# Patient Record
Sex: FEMALE | Race: WHITE
[De-identification: names, ages, dates, MRNs, and addresses within clinical notes are randomized per-mention and may not be internally consistent; named-entity substitution may affect disease eponyms.]

---

## 2017-07-03 ENCOUNTER — HOSPITAL ENCOUNTER (EMERGENCY)
Dept: HOSPITAL 10 - E/R | Age: 62
Discharge: HOME | End: 2017-07-03
Payer: MEDICARE

## 2017-07-03 VITALS
TEMPERATURE: 97.3 F | HEART RATE: 70 BPM | SYSTOLIC BLOOD PRESSURE: 138 MMHG | RESPIRATION RATE: 17 BRPM | DIASTOLIC BLOOD PRESSURE: 67 MMHG

## 2017-07-03 VITALS
BODY MASS INDEX: 22.89 KG/M2 | WEIGHT: 121.25 LBS | HEIGHT: 61 IN | WEIGHT: 121.25 LBS | HEIGHT: 61 IN | BODY MASS INDEX: 22.89 KG/M2

## 2017-07-03 DIAGNOSIS — Z99.2: ICD-10-CM
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
## 2017-07-03 DIAGNOSIS — S80.01XA: ICD-10-CM

## 2017-07-03 DIAGNOSIS — S09.90XA: Primary | ICD-10-CM

## 2017-07-03 DIAGNOSIS — S50.02XA: ICD-10-CM
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
## 2017-07-03 DIAGNOSIS — S02.2XXA: ICD-10-CM

## 2017-07-03 DIAGNOSIS — S80.02XA: ICD-10-CM

## 2017-07-03 DIAGNOSIS — I12.0: ICD-10-CM

## 2017-07-03 DIAGNOSIS — N18.6: ICD-10-CM

## 2017-07-03 LAB
ABNORMAL IP MESSAGE: 1
ADD SCAN DIFF: YES
ANION GAP SERPL CALC-SCNC: 22 MMOL/L (ref 8–16)
ANISOCYTOSIS BLD QL SMEAR: (no result)
BASOPHILS # BLD AUTO: 0 10^3/UL (ref 0–0.1)
BASOPHILS NFR BLD: 1 % (ref 0–2)
BUN SERPL-MCNC: 30 MG/DL (ref 7–20)
CALCIUM SERPL-MCNC: 9.5 MG/DL (ref 8.4–10.2)
CHLORIDE SERPL-SCNC: 94 MMOL/L (ref 97–110)
CO2 SERPL-SCNC: 31 MMOL/L (ref 21–31)
CREAT SERPL-MCNC: 3.94 MG/DL (ref 0.44–1)
EOSINOPHIL # BLD: 0.3 10^3/UL (ref 0–0.5)
EOSINOPHIL NFR BLD: 8.6 % (ref 0–7)
ERYTHROCYTE [DISTWIDTH] IN BLOOD BY AUTOMATED COUNT: 15.3 % (ref 11.5–14.5)
GLUCOSE SERPL-MCNC: 131 MG/DL (ref 70–220)
HCT VFR BLD CALC: 32.8 % (ref 37–47)
HGB BLD-MCNC: 10.3 G/DL (ref 12–16)
LYMPHOCYTES # BLD AUTO: 0.7 10^3/UL (ref 0.8–2.9)
LYMPHOCYTES NFR BLD AUTO: 25 % (ref 15–51)
MACROCYTES BLD QL SMEAR: (no result)
MCH RBC QN AUTO: 33.6 PG (ref 29–33)
MCHC RBC AUTO-ENTMCNC: 31.4 G/DL (ref 32–37)
MCV RBC AUTO: 106.8 FL (ref 82–101)
MONOCYTES # BLD: 0.2 10^3/UL (ref 0.3–0.9)
MONOCYTES NFR BLD: 6.5 % (ref 0–11)
NEUTROPHILS # BLD: 1.7 10^3/UL (ref 1.6–7.5)
NEUTROPHILS NFR BLD AUTO: 58.9 % (ref 39–77)
NRBC # BLD MANUAL: 0 10^3/UL (ref 0–0)
NRBC BLD QL: 0 /100WBC (ref 0–0)
PLATELET # BLD EST: (no result) 10*3/UL
PLATELET # BLD: 61 10^3/UL (ref 140–415)
PMV BLD AUTO: 11.3 FL (ref 7.4–10.4)
POTASSIUM SERPL-SCNC: 4.4 MMOL/L (ref 3.5–5.1)
RBC # BLD AUTO: 3.07 10^6/UL (ref 4.2–5.4)
SODIUM SERPL-SCNC: 143 MMOL/L (ref 135–144)
TOTAL CELLS COUNTED PERCENT: 100 %
WBC # BLD AUTO: 2.9 10^3/UL (ref 4.8–10.8)

## 2017-07-03 PROCEDURE — 96374 THER/PROPH/DIAG INJ IV PUSH: CPT

## 2017-07-03 PROCEDURE — 73562 X-RAY EXAM OF KNEE 3: CPT

## 2017-07-03 PROCEDURE — 93005 ELECTROCARDIOGRAM TRACING: CPT

## 2017-07-03 PROCEDURE — 70486 CT MAXILLOFACIAL W/O DYE: CPT

## 2017-07-03 PROCEDURE — 73080 X-RAY EXAM OF ELBOW: CPT

## 2017-07-03 PROCEDURE — 36415 COLL VENOUS BLD VENIPUNCTURE: CPT

## 2017-07-03 PROCEDURE — 80048 BASIC METABOLIC PNL TOTAL CA: CPT

## 2017-07-03 PROCEDURE — 70450 CT HEAD/BRAIN W/O DYE: CPT

## 2017-07-03 PROCEDURE — 85025 COMPLETE CBC W/AUTO DIFF WBC: CPT

## 2017-07-03 PROCEDURE — 96375 TX/PRO/DX INJ NEW DRUG ADDON: CPT

## 2017-07-03 NOTE — RADRPT
PROCEDURE:   CT brain without contrast. 

 

CLINICAL INDICATION:   Injury and pain. 

 

TECHNIQUE:    CT scan of the brain was performed on a multi-detector high-resolution CT scanner.  Co
ntiguous axial images were obtained from the skull base to the vertex without intravenous contrast. 
 Coronal and sagittal reformatted images were also obtained.  Images were reviewed on the PACS works
tation.

 

One or more of the following dose reduction techniques were used:

- Automated exposure control.

- Adjustment of the mA and/or kV according to patient size.

- Use of iterative reconstruction technique.

 

Exam CTD/vol = 44.68 mGy.

Total exam DLP = 720.23 mGy-cm.   

 

COMPARISON:   None. 

 

FINDINGS:

The ventricles and cortical sulci are prominent consistent with mild age related volume loss.  There
 are patchy areas of low attenuation within the periventricular white matter consistent with minimal
 chronic ischemic changes secondary to small vessel disease.  There is no mass effect or midline roselyn
ft.  There is no intracranial hemorrhage or abnormal extra-axial collection. There are atherosclerot
ic calcifications within bilateral distal internal carotid arteries.

 

The calvarium is intact.  There is no evidence of fracture.  Visualized paranasal sinuses and mastoi
d air cells are clear.  

 

IMPRESSION:

No acute intracranial abnormality identified.

 

Mild age related volume loss and minimal chronic ischemic white matter disease.

 

Cerebral atherosclerosis.

_____________________________________________ 

.Ryan Cameron MD, MD           Date    Time 

Electronically viewed and signed by .Ryan Cameron MD, MD on 07/03/2017 03:04 

 

D:  07/03/2017 03:04  T:  07/03/2017 03:04

.T/

## 2017-07-03 NOTE — RADRPT
PROCEDURE:   CT facial bones without contrast. 

 

CLINICAL INDICATION:   Injury and pain. 

 

TECHNIQUE:    CT scan of the facial bones [facility]  Contiguous axial images were obtained without intravenous contrast.  Coronal and sagittal refor
matted images were also obtained.  Images were reviewed on the PACS workstation.

 

One or more of the following dose reduction techniques were used:

- Automated exposure control.

- Adjustment of the mA and/or kV according to patient size.

- Use of iterative reconstruction technique.

 

Exam CTD/vol = 29.52 mGy.

Total exam DLP = 600.40 mGy-cm.   

 

COMPARISON:   None. 

 

FINDINGS:

There are fractures of the nasal bone.  Bilateral orbital rims, zygoma and zygomatic arches are inta
ct.  The pterygoid plates are intact. The mandible and maxilla are within normal limits.  Bilateral 
temporomandibular joints are within normal limits.  There is mild mucoperiosteal disease within the 
left maxillary sinus and right frontal sinus.  There are no air-fluid levels.

 

Bilateral orbital globes are symmetric and within normal limits.  The extraocular muscles are symmet
saba and of normal caliber.  Preseptal spaces are within normal limits.  Retrobulbar fat are clear.  
Bilateral optic nerves and superior ophthalmic veins are within normal limits.  

 

IMPRESSION:

Nasal bone fractures.

Mild paranasal sinus inflammatory disease.

_____________________________________________ 

.Ryan Cameron MD, MD           Date    Time 

Electronically viewed and signed by .Ryan Cameron MD, MD on 07/03/2017 03:08 

 

D:  07/03/2017 03:08  T:  07/03/2017 03:08

.T/

## 2017-07-03 NOTE — RADRPT
PROCEDURE:    XR Elbow. 

 

CLINICAL INDICATION:   Trauma.  Pain. 

 

TECHNIQUE:   Three views of the left elbow are available for review 

 

COMPARISON:   None available 

 

FINDINGS:

 

There is no fracture.  Joint relationships are maintained.  Bone mineralization is within normal crum
its.  There is no posterior fat pad sign.  Soft tissues unremarkable.

 

IMPRESSION:

 

1. No fracture or dislocation.

 

RPTAT:  HMVK

_____________________________________________ 

.Davie Villafana MD, MD           Date    Time 

Electronically viewed and signed by .Davie Villafana MD, MD on 07/03/2017 02:35 

 

D:  07/03/2017 02:35  T:  07/03/2017 02:35

.K/

## 2017-07-03 NOTE — ERD
ER Documentation


Chief Complaint


Date/Time


DATE: 7/3/17 


TIME: 05:36


Chief Complaint


PT FELL ASLEEP AND FELL FORWARD, LEFT ELBOW INJURY, NOSE INJURY





HPI


This 62-year-old female presents for falling asleep while sitting and falling 

forward.  She fell forward and hit her nasal bridge on the ground as well as 

her knees and her left elbow.  Denies any loss of consciousness.  States that 

she felt well earlier today and has had no other issues except for an itchy 

rash on her lower thighs that she has been taking Benadryl for.  She receives 

dialysis and last received on Saturday with no complications.  She has no chest 

pain, dizziness, shortness of breath.  She has not had fevers and chills lately.





ROS


All systems reviewed and are negative except as per history of present illness.





Medications


Home Meds


Active Scripts


Naproxen* (Naproxen*) 375 Mg Tablet, 375 MG PO BID Y for PAIN, #20 TAB


   Prov:NATHALIE CALDERON DO         7/3/17


Hydrocodone/Acetaminophen (Norco 5-325 Tablet) 1 Each Tablet, 1 EACH PO Q6 for 

SEVERE PAIN LEVEL 7-10, #16 TAB


   Prov:NATHALIE CALDERON DO         7/3/17


Ondansetron (Zofran Odt) 4 Mg Tab.rapdis, 4 MG PO Q6 for NAUSEA, #10


   Prov:NATHALIE CALDERON DO         7/3/17





Allergies


Allergies:  


Coded Allergies:  


     lidocaine (Unverified  Allergy, Unknown, 7/3/17)





PMhx/Soc


History of Surgery:  Yes (CHOLECYSTECTOMY, CATARACTS, ANGIOGRAM X'S 2)


Anesthesia Reaction:  No


Hx Neurological Disorder:  No


Hx Respiratory Disorders:  No


Hx Cardiac Disorders:  Yes (HTN)


Hx Psychiatric Problems:  No


Hx Miscellaneous Medical Probl:  Yes (ESRD, DIALYSIS)


Hx Alcohol Use:  No


Hx Substance Use:  No


Hx Tobacco Use:  No


Smoking Status:  Never smoker





Physical Exam


Vitals





Vital Signs








  Date Time  Temp Pulse Resp B/P Pulse Ox O2 Delivery O2 Flow Rate FiO2


 


7/3/17 04:55 97.3 70 17 153/67 95 Room Air  


 


7/3/17 01:40 97.3 75 17 151/67 98 Room Air  


 


7/3/17 00:41 97.3 80 17 173/75 95   








Physical Exam


Const:  []           Mild distress, appears uncomfortable


Head:   Atraumatic 


Eyes:    Normal Conjunctiva, EOMI, PRL


ENT:    Nasal bridge with swelling and no lacerations.  Tenderness palpation 

about nose but no other bones of the face are tender.  No septal hematoma peer


Neck:               Full range of motion..~ No meningismus.  No midline 

tenderness.


Resp:    Clear to auscultation bilaterally


Cardio:    Regular rate and rhythm, no murmurs


Abd:    Soft, non tender, non distended. Normal bowel sounds


Skin:    No petechiae or rashes


Back:    No midline or flank tenderness


Ext:    Left elbow edema approximately 3 cm from left upper arm fistula which 

has a palpable thrill and no apparent damage.  Also bilateral knee swelling 

with some tenderness to palpation however patient is able to move her knees and 

bend them without assistance.  Distal pulses intact all 4 extremities


Neur:    Awake and alert and oriented 3, no focal deficits, cranial 2 through 

12 intact, no cerebellar deficit


Psych:    Normal Mood and Affect


Result Diagram:  


7/3/17 0210                                                                    

            7/3/17 0210





Results 24 hrs





 Laboratory Tests








Test


  7/3/17


02:10


 


White Blood Count 2.910^3/ul 


 


Red Blood Count 3.0710^6/ul 


 


Hemoglobin 10.3g/dl 


 


Hematocrit 32.8% 


 


Mean Corpuscular Volume 106.8fl 


 


Mean Corpuscular Hemoglobin 33.6pg 


 


Mean Corpuscular Hemoglobin


Concent 31.4g/dl 


 


 


Red Cell Distribution Width 15.3% 


 


Platelet Count 6110^3/UL 


 


Mean Platelet Volume 11.3fl 


 


Neutrophils % 58.9% 


 


Lymphocytes % 25.0% 


 


Monocytes % 6.5% 


 


Eosinophils % 8.6% 


 


Basophils % 1.0% 


 


Nucleated Red Blood Cells % 0.0/100WBC 


 


Neutrophils # 1.710^3/ul 


 


Lymphocytes # 0.710^3/ul 


 


Monocytes # 0.210^3/ul 


 


Eosinophils # 0.310^3/ul 


 


Basophils # 0.010^3/ul 


 


Nucleated Red Blood Cells # 0.010^3/ul 


 


Platelet Estimate


  PLT APPEAR


DECREASED


 


Anisocytosis 1+ 


 


Macrocytosis 1+ 


 


Sodium Level 143mmol/L 


 


Potassium Level 4.4mmol/L 


 


Chloride Level 94mmol/L 


 


Carbon Dioxide Level 31mmol/L 


 


Anion Gap 22 


 


Blood Urea Nitrogen 30mg/dl 


 


Creatinine 3.94mg/dl 


 


Glucose Level 131mg/dl 


 


Calcium Level 9.5mg/dl 








 Current Medications








 Medications


  (Trade)  Dose


 Ordered  Sig/Payton


 Route


 PRN Reason  Start Time


 Stop Time Status Last Admin


Dose Admin


 


 Sodium Chloride


  (NS)  1,000 ml @ 


 1,000 mls/hr  Q1H STAT


 IV


   7/3/17 01:46


 7/3/17 02:45 DC 7/3/17 02:32


 


 


 Morphine Sulfate


  (morphine)  2 mg  ONCE  ONCE


 IV


   7/3/17 02:00


 7/3/17 02:01 DC 7/3/17 02:33


 


 


 Oxymetazoline HCl


  (Afrin Spray)  2 spray  ONCE  ONCE


 NASAL


   7/3/17 04:00


 7/3/17 04:37 DC 7/3/17 04:53


 


 


 Ketorolac


 Tromethamine


  (Toradol)  15 mg  ONCE  STAT


 IV


   7/3/17 04:29


 7/3/17 04:30 DC 7/3/17 04:36


 


 


 Prednisone


  (Prednisone)  50 mg  ONCE  ONCE


 PO


   7/3/17 05:30


 7/3/17 05:31 DC 7/3/17 05:33


 











Procedures/MDM


Accidental fall and fell asleep in a seated position without any obvious other 

etiology that is concerning.  Nasal fractures.  Bilateral knee contusion as 

well as left elbow contusion.  Patient was given morphine as well as small dose 

of Toradol and with relief of pain.  She has family at bedside is going to be 

discharged in their care.  She was also given Afrin nasal spray which did help 

open up her nasal passages.  Giving her primary care follow-up in the next 2 

days as well as ENT follow-up.  She has no apparent metabolic abnormalities and 

it appears that dialysis went well.  No signs of infection of the patient does 

have leukocytosis and a macrocytic anemia.





EKG interpretation: Normal sinus rhythm rate of 71, normal axis, T-wave 

inversions in lateral leads, no ST elevations or depressions concerning for 

ischemia, normal intervals.


Cardiac monitor interpretation: Normal sinus rhythm without arrhythmia


Maxillofacial CT: Bilateral nasal bone fractures without other fracture or 

dislocation.  No orbital fracture.  No foreign bodies


CT head interpretation: No acute process.  I see no hemorrhage, no mass-effect, 

no midline shift, no skull fracture.


Bilateral knee x-ray interpretation by myself: I see no fracture dislocation.  

There is soft tissue swelling bilaterally.


Left elbow x-ray interpretation: Soft tissue swelling without fracture 

dislocation.





Departure


Diagnosis:  


 Primary Impression:  


 Head injury


 Additional Impressions:  


 Nasal bone fracture


 Accidental fall


 Contusion of knee, left


 Contusion of knee, right


 Left elbow contusion


Condition:  Stable


Patient Instructions:  Facial Fracture, HEAD INJURY, No Wake-Up (Adult)





Additional Instructions:  


Call your primary care doctor TOMORROW for an appointment during the next 1-2 

days.  Get a referral for an ENT doctor.  See the doctor sooner or return here 

if your condition worsens before your appointment time.











NATHALIE CALDERON DO Jul 3, 2017 05:36

## 2017-07-03 NOTE — RADRPT
PROCEDURE:   XR Left  Knee. 

 

CLINICAL INDICATION:   Left knee pain. Trauma.

 

TECHNIQUE:   Three views of the left knee are available for review. 

 

COMPARISON:   None available 

 

FINDINGS:

 

There is mild diffuse soft tissue swelling.  There is no fracture.  Joint relationships are maintain
ed. Patella is unremarkable.  Bone mineralization is within normal limits.  

 

IMPRESSION:

 

1.  Diffuse soft tissue swelling.

2.  No acute fracture or dislocation is seen. 

 

 

RPTAT:  HMVK

_____________________________________________ 

.Davie Villafana MD, MD           Date    Time 

Electronically viewed and signed by .Davie Villafana MD, MD on 07/03/2017 02:36 

 

D:  07/03/2017 02:36  T:  07/03/2017 02:36

.K/

## 2017-07-03 NOTE — RADRPT
PROCEDURE:   XR Knee. 

 

CLINICAL INDICATION:   Pain.  Trauma. 

 

TECHNIQUE:   Three views of the right knee are available for review. 

 

COMPARISON:   None available 

 

FINDINGS:

 

There is mild diffuse soft tissue swelling.  There is no fracture.  Joint relationships are maintain
ed.  Bone mineralization is within normal limits.  Vascular calcifications are present.

 

IMPRESSION:

 

1.  Diffuse soft tissue swelling.

2.  No acute fracture or dislocation is seen. 

3.  Vascular calcifications.

 

 

RPTAT:  HMVK

_____________________________________________ 

.Davie Villafana MD, MD           Date    Time 

Electronically viewed and signed by .Davie Villafana MD, MD on 07/03/2017 02:38 

 

D:  07/03/2017 02:38  T:  07/03/2017 02:38

.K/

## 2018-04-05 ENCOUNTER — HOSPITAL ENCOUNTER (INPATIENT)
Dept: HOSPITAL 91 - E/R | Age: 63
LOS: 39 days | Discharge: NURSING, REHAB OR LONG-TERM CARE | DRG: 871 | End: 2018-05-14
Payer: MEDICARE

## 2018-04-05 DIAGNOSIS — I42.9: ICD-10-CM

## 2018-04-05 DIAGNOSIS — E55.9: ICD-10-CM

## 2018-04-05 DIAGNOSIS — I13.2: ICD-10-CM

## 2018-04-05 DIAGNOSIS — A41.02: Primary | ICD-10-CM

## 2018-04-05 DIAGNOSIS — M25.532: ICD-10-CM

## 2018-04-05 DIAGNOSIS — E87.5: ICD-10-CM

## 2018-04-05 DIAGNOSIS — D63.1: ICD-10-CM

## 2018-04-05 DIAGNOSIS — Z90.49: ICD-10-CM

## 2018-04-05 DIAGNOSIS — N93.9: ICD-10-CM

## 2018-04-05 DIAGNOSIS — I31.3: ICD-10-CM

## 2018-04-05 DIAGNOSIS — N18.6: ICD-10-CM

## 2018-04-05 DIAGNOSIS — I50.33: ICD-10-CM

## 2018-04-05 DIAGNOSIS — K68.12: ICD-10-CM

## 2018-04-05 DIAGNOSIS — D69.6: ICD-10-CM

## 2018-04-05 DIAGNOSIS — Z99.2: ICD-10-CM

## 2018-04-05 DIAGNOSIS — M32.9: ICD-10-CM

## 2018-04-05 DIAGNOSIS — A60.1: ICD-10-CM

## 2018-04-05 DIAGNOSIS — K59.00: ICD-10-CM

## 2018-04-05 DIAGNOSIS — R18.8: ICD-10-CM

## 2018-04-05 DIAGNOSIS — A60.04: ICD-10-CM

## 2018-04-05 DIAGNOSIS — N39.0: ICD-10-CM

## 2018-04-05 DIAGNOSIS — J90: ICD-10-CM

## 2018-04-05 DIAGNOSIS — K52.9: ICD-10-CM

## 2018-04-05 LAB
% IRON SATURATION: 26 % SAT (ref 22–52)
ABNORMAL IP MESSAGE: 1
ADD MAN DIFF?: NO
ALANINE AMINOTRANSFERASE: 20 IU/L (ref 13–69)
ALBUMIN/GLOBULIN RATIO: 0.87
ALBUMIN: 2.9 G/DL (ref 3.3–4.9)
ALKALINE PHOSPHATASE: 135 IU/L (ref 42–121)
ANION GAP: 18 (ref 8–16)
ANISOCYTOSIS: (no result) (ref 0–0)
ASPARTATE AMINO TRANSFERASE: 17 IU/L (ref 15–46)
BAND NEUTROPHILS #M: 1.2 10^3/UL (ref 0–0.6)
BAND NEUTROPHILS % (M): 18 % (ref 0–4)
BASOPHIL #: 0 10^3/UL (ref 0–0.1)
BASOPHILS %: 0.1 % (ref 0–2)
BILIRUBIN,DIRECT: 0 MG/DL (ref 0–0.2)
BILIRUBIN,TOTAL: 0.5 MG/DL (ref 0.2–1.3)
BLOOD UREA NITROGEN: 68 MG/DL (ref 7–20)
CALCIUM: 8.5 MG/DL (ref 8.4–10.2)
CARBON DIOXIDE: 25 MMOL/L (ref 21–31)
CHLORIDE: 103 MMOL/L (ref 97–110)
CREATININE: 5.12 MG/DL (ref 0.44–1)
EOSINOPHILS #: 0.1 10^3/UL (ref 0–0.5)
EOSINOPHILS % (M): 2 % (ref 0–7)
EOSINOPHILS %: 1 % (ref 0–7)
GIANT THROMBO% (M): 2 % (ref 0–0)
GLOBULIN: 3.3 G/DL (ref 1.3–3.2)
GLUCOSE: 80 MG/DL (ref 70–220)
HEMATOCRIT: 27.4 % (ref 37–47)
HEMOGLOBIN: 8.9 G/DL (ref 12–16)
HEPATITIS B SURFACE ANTIGEN: NEGATIVE
IRON: 42 UG/DL (ref 35–150)
LIPASE: < 10 U/L (ref 23–300)
LYMPHOCYTES #: 0.2 10^3/UL (ref 0.8–2.9)
LYMPHOCYTES #M: 0.1 10^3/UL (ref 0.8–2.9)
LYMPHOCYTES % (M): 2 % (ref 15–51)
LYMPHOCYTES %: 3.2 % (ref 15–51)
MACROCYTOSIS: (no result) (ref 0–0)
MEAN CORPUSCULAR HEMOGLOBIN: 33.7 PG (ref 29–33)
MEAN CORPUSCULAR HGB CONC: 32.5 G/DL (ref 32–37)
MEAN CORPUSCULAR VOLUME: 103.8 FL (ref 82–101)
MEAN PLATELET VOLUME: 12.6 FL (ref 7.4–10.4)
MONOCYTE #: 0.3 10^3/UL (ref 0.3–0.9)
MONOCYTE #M: 0.2 10^3/UL (ref 0.3–0.9)
MONOCYTES % (M): 3 % (ref 0–11)
MONOCYTES %: 3.8 % (ref 0–11)
NEUTROPHIL #: 6.6 10^3/UL (ref 1.6–7.5)
NEUTROPHILS %: 91.1 % (ref 39–77)
NUCLEATED RED BLOOD CELLS #: 0 10^3/UL (ref 0–0)
NUCLEATED RED BLOOD CELLS%: 0 /100WBC (ref 0–0)
PATH REVIEW?: YES
PLATELET COUNT: 46 10^3/UL (ref 140–415)
PLATELET ESTIMATE: (no result)
POIKILOCYTOSIS: (no result) (ref 0–0)
POLYCHROMASIA: (no result) (ref 0–0)
POSITIVE DIFF: (no result)
POTASSIUM: 4.9 MMOL/L (ref 3.5–5.1)
RED BLOOD COUNT: 2.64 10^6/UL (ref 4.2–5.4)
RED CELL DISTRIBUTION WIDTH: 13.8 % (ref 11.5–14.5)
SEG NEUT #M: 5.5 10^3/UL (ref 1.6–7.5)
SEGMENTED NEUTROPHILS (M) %: 75 % (ref 39–77)
SMUDGE%M: 4 % (ref 0–0)
SODIUM: 141 MMOL/L (ref 135–144)
TOTAL IRON BINDING CAPACITY: 159 UG/DL (ref 241–421)
TOTAL PROTEIN: 6.2 G/DL (ref 6.1–8.1)
TROPONIN-I: 0.06 NG/ML (ref 0–0.12)
WHITE BLOOD COUNT: 7.2 10^3/UL (ref 4.8–10.8)

## 2018-04-05 PROCEDURE — 82787 IGG 1 2 3 OR 4 EACH: CPT

## 2018-04-05 PROCEDURE — 97163 PT EVAL HIGH COMPLEX 45 MIN: CPT

## 2018-04-05 PROCEDURE — 72131 CT LUMBAR SPINE W/O DYE: CPT

## 2018-04-05 PROCEDURE — 87255 GENET VIRUS ISOLATE HSV: CPT

## 2018-04-05 PROCEDURE — 32555 ASPIRATE PLEURA W/ IMAGING: CPT

## 2018-04-05 PROCEDURE — 82607 VITAMIN B-12: CPT

## 2018-04-05 PROCEDURE — 93325 DOPPLER ECHO COLOR FLOW MAPG: CPT

## 2018-04-05 PROCEDURE — 82945 GLUCOSE OTHER FLUID: CPT

## 2018-04-05 PROCEDURE — 83540 ASSAY OF IRON: CPT

## 2018-04-05 PROCEDURE — 93005 ELECTROCARDIOGRAM TRACING: CPT

## 2018-04-05 PROCEDURE — 36430 TRANSFUSION BLD/BLD COMPNT: CPT

## 2018-04-05 PROCEDURE — 82553 CREATINE MB FRACTION: CPT

## 2018-04-05 PROCEDURE — 73221 MRI JOINT UPR EXTREM W/O DYE: CPT

## 2018-04-05 PROCEDURE — 93312 ECHO TRANSESOPHAGEAL: CPT

## 2018-04-05 PROCEDURE — 82746 ASSAY OF FOLIC ACID SERUM: CPT

## 2018-04-05 PROCEDURE — 86803 HEPATITIS C AB TEST: CPT

## 2018-04-05 PROCEDURE — 97110 THERAPEUTIC EXERCISES: CPT

## 2018-04-05 PROCEDURE — 85025 COMPLETE CBC W/AUTO DIFF WBC: CPT

## 2018-04-05 PROCEDURE — 83615 LACTATE (LD) (LDH) ENZYME: CPT

## 2018-04-05 PROCEDURE — 76536 US EXAM OF HEAD AND NECK: CPT

## 2018-04-05 PROCEDURE — 85730 THROMBOPLASTIN TIME PARTIAL: CPT

## 2018-04-05 PROCEDURE — 86644 CMV ANTIBODY: CPT

## 2018-04-05 PROCEDURE — 76830 TRANSVAGINAL US NON-OB: CPT

## 2018-04-05 PROCEDURE — 86706 HEP B SURFACE ANTIBODY: CPT

## 2018-04-05 PROCEDURE — 84155 ASSAY OF PROTEIN SERUM: CPT

## 2018-04-05 PROCEDURE — 85610 PROTHROMBIN TIME: CPT

## 2018-04-05 PROCEDURE — 87086 URINE CULTURE/COLONY COUNT: CPT

## 2018-04-05 PROCEDURE — 80048 BASIC METABOLIC PNL TOTAL CA: CPT

## 2018-04-05 PROCEDURE — 76856 US EXAM PELVIC COMPLETE: CPT

## 2018-04-05 PROCEDURE — 71045 X-RAY EXAM CHEST 1 VIEW: CPT

## 2018-04-05 PROCEDURE — 87102 FUNGUS ISOLATION CULTURE: CPT

## 2018-04-05 PROCEDURE — 83986 ASSAY PH BODY FLUID NOS: CPT

## 2018-04-05 PROCEDURE — 84100 ASSAY OF PHOSPHORUS: CPT

## 2018-04-05 PROCEDURE — 74176 CT ABD & PELVIS W/O CONTRAST: CPT

## 2018-04-05 PROCEDURE — 86900 BLOOD TYPING SEROLOGIC ABO: CPT

## 2018-04-05 PROCEDURE — 72128 CT CHEST SPINE W/O DYE: CPT

## 2018-04-05 PROCEDURE — 86901 BLOOD TYPING SEROLOGIC RH(D): CPT

## 2018-04-05 PROCEDURE — 97530 THERAPEUTIC ACTIVITIES: CPT

## 2018-04-05 PROCEDURE — 86850 RBC ANTIBODY SCREEN: CPT

## 2018-04-05 PROCEDURE — 87116 MYCOBACTERIA CULTURE: CPT

## 2018-04-05 PROCEDURE — 87070 CULTURE OTHR SPECIMN AEROBIC: CPT

## 2018-04-05 PROCEDURE — 84560 ASSAY OF URINE/URIC ACID: CPT

## 2018-04-05 PROCEDURE — 93306 TTE W/DOPPLER COMPLETE: CPT

## 2018-04-05 PROCEDURE — 86920 COMPATIBILITY TEST SPIN: CPT

## 2018-04-05 PROCEDURE — 83690 ASSAY OF LIPASE: CPT

## 2018-04-05 PROCEDURE — 80053 COMPREHEN METABOLIC PANEL: CPT

## 2018-04-05 PROCEDURE — 90935 HEMODIALYSIS ONE EVALUATION: CPT

## 2018-04-05 PROCEDURE — 99285 EMERGENCY DEPT VISIT HI MDM: CPT

## 2018-04-05 PROCEDURE — 96374 THER/PROPH/DIAG INJ IV PUSH: CPT

## 2018-04-05 PROCEDURE — 36415 COLL VENOUS BLD VENIPUNCTURE: CPT

## 2018-04-05 PROCEDURE — 80069 RENAL FUNCTION PANEL: CPT

## 2018-04-05 PROCEDURE — 93970 EXTREMITY STUDY: CPT

## 2018-04-05 PROCEDURE — 82140 ASSAY OF AMMONIA: CPT

## 2018-04-05 PROCEDURE — 86430 RHEUMATOID FACTOR TEST QUAL: CPT

## 2018-04-05 PROCEDURE — 87340 HEPATITIS B SURFACE AG IA: CPT

## 2018-04-05 PROCEDURE — 97116 GAIT TRAINING THERAPY: CPT

## 2018-04-05 PROCEDURE — 88104 CYTOPATH FL NONGYN SMEARS: CPT

## 2018-04-05 PROCEDURE — 73110 X-RAY EXAM OF WRIST: CPT

## 2018-04-05 PROCEDURE — 82042 OTHER SOURCE ALBUMIN QUAN EA: CPT

## 2018-04-05 PROCEDURE — 83735 ASSAY OF MAGNESIUM: CPT

## 2018-04-05 PROCEDURE — 78806: CPT

## 2018-04-05 PROCEDURE — 80202 ASSAY OF VANCOMYCIN: CPT

## 2018-04-05 PROCEDURE — 87040 BLOOD CULTURE FOR BACTERIA: CPT

## 2018-04-05 PROCEDURE — 86022 PLATELET ANTIBODIES: CPT

## 2018-04-05 PROCEDURE — 84157 ASSAY OF PROTEIN OTHER: CPT

## 2018-04-05 PROCEDURE — 83036 HEMOGLOBIN GLYCOSYLATED A1C: CPT

## 2018-04-05 PROCEDURE — 83605 ASSAY OF LACTIC ACID: CPT

## 2018-04-05 PROCEDURE — 82270 OCCULT BLOOD FECES: CPT

## 2018-04-05 PROCEDURE — 84484 ASSAY OF TROPONIN QUANT: CPT

## 2018-04-05 PROCEDURE — 87045 FECES CULTURE AEROBIC BACT: CPT

## 2018-04-05 PROCEDURE — 82962 GLUCOSE BLOOD TEST: CPT

## 2018-04-05 PROCEDURE — 86704 HEP B CORE ANTIBODY TOTAL: CPT

## 2018-04-05 PROCEDURE — 81001 URINALYSIS AUTO W/SCOPE: CPT

## 2018-04-05 PROCEDURE — 99217: CPT

## 2018-04-05 PROCEDURE — 96375 TX/PRO/DX INJ NEW DRUG ADDON: CPT

## 2018-04-05 PROCEDURE — 90686 IIV4 VACC NO PRSV 0.5 ML IM: CPT

## 2018-04-05 PROCEDURE — 87075 CULTR BACTERIA EXCEPT BLOOD: CPT

## 2018-04-05 PROCEDURE — 88305 TISSUE EXAM BY PATHOLOGIST: CPT

## 2018-04-05 PROCEDURE — 89051 BODY FLUID CELL COUNT: CPT

## 2018-04-05 PROCEDURE — 86709 HEPATITIS A IGM ANTIBODY: CPT

## 2018-04-05 PROCEDURE — 74177 CT ABD & PELVIS W/CONTRAST: CPT

## 2018-04-05 PROCEDURE — 86945 BLOOD PRODUCT/IRRADIATION: CPT

## 2018-04-05 PROCEDURE — 82550 ASSAY OF CK (CPK): CPT

## 2018-04-05 RX ADMIN — ALBUMIN (HUMAN) 1 MLS/HR: 0.25 INJECTION, SOLUTION INTRAVENOUS at 21:17

## 2018-04-05 RX ADMIN — PREGABALIN 1 MG: 75 CAPSULE ORAL at 20:28

## 2018-04-05 RX ADMIN — DOCUSATE SODIUM 1 MG: 100 CAPSULE, LIQUID FILLED ORAL at 15:21

## 2018-04-05 RX ADMIN — ONDANSETRON HYDROCHLORIDE 1 MG: 2 INJECTION, SOLUTION INTRAMUSCULAR; INTRAVENOUS at 11:11

## 2018-04-05 RX ADMIN — ALBUMIN (HUMAN) 1 MLS/HR: 0.25 INJECTION, SOLUTION INTRAVENOUS at 21:56

## 2018-04-06 LAB
ABNORMAL IP MESSAGE: 1
ADD MAN DIFF?: NO
ALBUMIN: 3.2 G/DL (ref 3.3–4.9)
ANION GAP: 16 (ref 8–16)
BASOPHIL #: 0 10^3/UL (ref 0–0.1)
BASOPHILS %: 0.1 % (ref 0–2)
BLOOD UREA NITROGEN: 38 MG/DL (ref 7–20)
CALCIUM: 8.4 MG/DL (ref 8.4–10.2)
CARBON DIOXIDE: 30 MMOL/L (ref 21–31)
CHLORIDE: 100 MMOL/L (ref 97–110)
CREATININE: 2.92 MG/DL (ref 0.44–1)
EOSINOPHILS #: 0 10^3/UL (ref 0–0.5)
EOSINOPHILS %: 0 % (ref 0–7)
GLUCOSE: 155 MG/DL (ref 70–220)
HEMATOCRIT: 29.1 % (ref 37–47)
HEMOGLOBIN: 9.5 G/DL (ref 12–16)
LYMPHOCYTES #: 0.2 10^3/UL (ref 0.8–2.9)
LYMPHOCYTES %: 2.2 % (ref 15–51)
MAGNESIUM: 2 MG/DL (ref 1.7–2.5)
MEAN CORPUSCULAR HEMOGLOBIN: 33.2 PG (ref 29–33)
MEAN CORPUSCULAR HGB CONC: 32.6 G/DL (ref 32–37)
MEAN CORPUSCULAR VOLUME: 101.7 FL (ref 82–101)
MEAN PLATELET VOLUME: 12.5 FL (ref 7.4–10.4)
MONOCYTE #: 0.3 10^3/UL (ref 0.3–0.9)
MONOCYTES %: 2.6 % (ref 0–11)
NEUTROPHIL #: 9.3 10^3/UL (ref 1.6–7.5)
NEUTROPHILS %: 94.3 % (ref 39–77)
NUCLEATED RED BLOOD CELLS #: 0 10^3/UL (ref 0–0)
NUCLEATED RED BLOOD CELLS%: 0 /100WBC (ref 0–0)
PHOSPHORUS: 3.9 MG/DL (ref 2.5–4.9)
PLATELET COUNT: 52 10^3/UL (ref 140–415)
POSITIVE DIFF: (no result)
POTASSIUM: 4.4 MMOL/L (ref 3.5–5.1)
RED BLOOD COUNT: 2.86 10^6/UL (ref 4.2–5.4)
RED CELL DISTRIBUTION WIDTH: 13.8 % (ref 11.5–14.5)
SODIUM: 142 MMOL/L (ref 135–144)
WHITE BLOOD COUNT: 9.9 10^3/UL (ref 4.8–10.8)

## 2018-04-06 RX ADMIN — DOCUSATE SODIUM 1 MG: 100 CAPSULE, LIQUID FILLED ORAL at 05:50

## 2018-04-06 RX ADMIN — PREGABALIN 1 MG: 75 CAPSULE ORAL at 20:58

## 2018-04-06 RX ADMIN — INFLUENZA A VIRUS A/MICHIGAN/45/2015 X-275 (H1N1) ANTIGEN (FORMALDEHYDE INACTIVATED), INFLUENZA A VIRUS A/HONG KONG/4801/2014 X-263B (H3N2) ANTIGEN (FORMALDEHYDE INACTIVATED), INFLUENZA B VIRUS B/PHUKET/3073/2013 ANTIGEN (FORMALDEHYDE INACTIVATED), AND INFLUENZA B VIRUS B/BRISBANE/60/2008 ANTIGEN (FORMALDEHYDE INACTIVATED) 1 ML: 15; 15; 15; 15 INJECTION, SUSPENSION INTRAMUSCULAR at 12:21

## 2018-04-06 RX ADMIN — AZATHIOPRINE 1 MG: 50 TABLET ORAL at 08:41

## 2018-04-06 RX ADMIN — PANTOPRAZOLE SODIUM 1 MG: 40 TABLET, DELAYED RELEASE ORAL at 05:31

## 2018-04-06 RX ADMIN — HYDROCODONE BITARTRATE AND ACETAMINOPHEN 1 TAB: 5; 325 TABLET ORAL at 05:50

## 2018-04-06 RX ADMIN — VITAMIN D, TAB 1000IU (100/BT) 1 UNIT: 25 TAB at 08:40

## 2018-04-06 RX ADMIN — PREGABALIN 1 MG: 75 CAPSULE ORAL at 08:40

## 2018-04-07 ENCOUNTER — HOSPITAL ENCOUNTER (INPATIENT)
Age: 63
LOS: 37 days | Discharge: NURSING, REHAB OR LONG-TERM CARE | DRG: 871 | End: 2018-05-14

## 2018-04-07 LAB
ABNORMAL IP MESSAGE: 1
ADD MAN DIFF?: NO
ALBUMIN: 3.3 G/DL (ref 3.3–4.9)
ANION GAP: 16 (ref 8–16)
BASOPHIL #: 0 10^3/UL (ref 0–0.1)
BASOPHILS %: 0.2 % (ref 0–2)
BLOOD UREA NITROGEN: 50 MG/DL (ref 7–20)
CALCIUM: 8.6 MG/DL (ref 8.4–10.2)
CARBON DIOXIDE: 27 MMOL/L (ref 21–31)
CHLORIDE: 98 MMOL/L (ref 97–110)
CREATININE: 3.78 MG/DL (ref 0.44–1)
EOSINOPHILS #: 0 10^3/UL (ref 0–0.5)
EOSINOPHILS %: 0 % (ref 0–7)
GLUCOSE: 196 MG/DL (ref 70–220)
HEMATOCRIT: 29.1 % (ref 37–47)
HEMOGLOBIN: 9.6 G/DL (ref 12–16)
LACTIC ACID: 1.6 MMOL/L (ref 0.5–2)
LYMPHOCYTES #: 0.3 10^3/UL (ref 0.8–2.9)
LYMPHOCYTES %: 2.3 % (ref 15–51)
MAGNESIUM: 2 MG/DL (ref 1.7–2.5)
MEAN CORPUSCULAR HEMOGLOBIN: 33.3 PG (ref 29–33)
MEAN CORPUSCULAR HGB CONC: 33 G/DL (ref 32–37)
MEAN CORPUSCULAR VOLUME: 101 FL (ref 82–101)
MEAN PLATELET VOLUME: 12.8 FL (ref 7.4–10.4)
MONOCYTE #: 0.3 10^3/UL (ref 0.3–0.9)
MONOCYTES %: 2.5 % (ref 0–11)
NEUTROPHIL #: 10.4 10^3/UL (ref 1.6–7.5)
NEUTROPHILS %: 93.7 % (ref 39–77)
NUCLEATED RED BLOOD CELLS #: 0 10^3/UL (ref 0–0)
NUCLEATED RED BLOOD CELLS%: 0.3 /100WBC (ref 0–0)
PHOSPHORUS: 3.1 MG/DL (ref 2.5–4.9)
PLATELET COUNT: 53 10^3/UL (ref 140–415)
POSITIVE DIFF: (no result)
POTASSIUM: 4.4 MMOL/L (ref 3.5–5.1)
RED BLOOD COUNT: 2.88 10^6/UL (ref 4.2–5.4)
RED CELL DISTRIBUTION WIDTH: 14 % (ref 11.5–14.5)
SODIUM: 137 MMOL/L (ref 135–144)
TYPE AND SCREEN: 1 1
WHITE BLOOD COUNT: 11.1 10^3/UL (ref 4.8–10.8)

## 2018-04-07 RX ADMIN — AZATHIOPRINE 1 MG: 50 TABLET ORAL at 14:24

## 2018-04-07 RX ADMIN — ACETAMINOPHEN 1 MG: 325 TABLET, FILM COATED ORAL at 03:22

## 2018-04-07 RX ADMIN — CEFOTAXIME 1 MLS/HR: 2 INJECTION, POWDER, FOR SOLUTION INTRAMUSCULAR; INTRAVENOUS at 04:57

## 2018-04-07 RX ADMIN — PREGABALIN 1 MG: 75 CAPSULE ORAL at 14:25

## 2018-04-07 RX ADMIN — HYDROCODONE BITARTRATE AND ACETAMINOPHEN 1 TAB: 5; 325 TABLET ORAL at 22:28

## 2018-04-07 RX ADMIN — ACETAMINOPHEN 1 MG: 325 TABLET, FILM COATED ORAL at 12:37

## 2018-04-07 RX ADMIN — VITAMIN D, TAB 1000IU (100/BT) 1 UNIT: 25 TAB at 14:25

## 2018-04-07 RX ADMIN — PREGABALIN 1 MG: 75 CAPSULE ORAL at 22:22

## 2018-04-07 RX ADMIN — VANCOMYCIN HYDROCHLORIDE 1 MLS/HR: 500 INJECTION, POWDER, LYOPHILIZED, FOR SOLUTION INTRAVENOUS at 19:57

## 2018-04-07 RX ADMIN — VANCOMYCIN HYDROCHLORIDE 1 MLS/HR: 1 INJECTION, POWDER, LYOPHILIZED, FOR SOLUTION INTRAVENOUS at 05:49

## 2018-04-07 RX ADMIN — HYDROCODONE BITARTRATE AND ACETAMINOPHEN 1 TAB: 5; 325 TABLET ORAL at 10:41

## 2018-04-07 RX ADMIN — AZATHIOPRINE 1 MG: 50 TABLET ORAL at 15:15

## 2018-04-07 RX ADMIN — ACETAMINOPHEN 1 MG: 325 TABLET, FILM COATED ORAL at 04:57

## 2018-04-07 RX ADMIN — CEFEPIME 1 MLS/HR: 1 INJECTION, POWDER, FOR SOLUTION INTRAMUSCULAR; INTRAVENOUS at 14:25

## 2018-04-07 RX ADMIN — PANTOPRAZOLE SODIUM 1 MG: 40 TABLET, DELAYED RELEASE ORAL at 05:51

## 2018-04-07 RX ADMIN — HYDROCODONE BITARTRATE AND ACETAMINOPHEN 1 TAB: 5; 325 TABLET ORAL at 02:02

## 2018-04-08 LAB
ABNORMAL IP MESSAGE: 1
ADD MAN DIFF?: NO
ALBUMIN: 2.7 G/DL (ref 3.3–4.9)
ANION GAP: 15 (ref 8–16)
BASOPHIL #: 0 10^3/UL (ref 0–0.1)
BASOPHILS %: 0.2 % (ref 0–2)
BLOOD UREA NITROGEN: 40 MG/DL (ref 7–20)
CALCIUM: 8.3 MG/DL (ref 8.4–10.2)
CARBON DIOXIDE: 31 MMOL/L (ref 21–31)
CHLORIDE: 99 MMOL/L (ref 97–110)
CREATININE: 2.92 MG/DL (ref 0.44–1)
EOSINOPHILS #: 0 10^3/UL (ref 0–0.5)
EOSINOPHILS %: 0 % (ref 0–7)
GLUCOSE: 239 MG/DL (ref 70–220)
HEMATOCRIT: 27.2 % (ref 37–47)
HEMOGLOBIN: 8.9 G/DL (ref 12–16)
LYMPHOCYTES #: 0.3 10^3/UL (ref 0.8–2.9)
LYMPHOCYTES %: 2.4 % (ref 15–51)
MAGNESIUM: 1.9 MG/DL (ref 1.7–2.5)
MEAN CORPUSCULAR HEMOGLOBIN: 33.1 PG (ref 29–33)
MEAN CORPUSCULAR HGB CONC: 32.7 G/DL (ref 32–37)
MEAN CORPUSCULAR VOLUME: 101.1 FL (ref 82–101)
MEAN PLATELET VOLUME: 11.4 FL (ref 7.4–10.4)
MONOCYTE #: 0.4 10^3/UL (ref 0.3–0.9)
MONOCYTES %: 3.3 % (ref 0–11)
NEUTROPHIL #: 10.9 10^3/UL (ref 1.6–7.5)
NEUTROPHILS %: 93 % (ref 39–77)
NUCLEATED RED BLOOD CELLS #: 0 10^3/UL (ref 0–0)
NUCLEATED RED BLOOD CELLS%: 0 /100WBC (ref 0–0)
PHOSPHORUS: 3.2 MG/DL (ref 2.5–4.9)
PLATELET COUNT: 48 10^3/UL (ref 140–415)
POSITIVE DIFF: (no result)
POTASSIUM: 4.8 MMOL/L (ref 3.5–5.1)
RED BLOOD COUNT: 2.69 10^6/UL (ref 4.2–5.4)
RED CELL DISTRIBUTION WIDTH: 14 % (ref 11.5–14.5)
SODIUM: 140 MMOL/L (ref 135–144)
WHITE BLOOD COUNT: 11.8 10^3/UL (ref 4.8–10.8)

## 2018-04-08 RX ADMIN — HYDROCODONE BITARTRATE AND ACETAMINOPHEN 1 TAB: 5; 325 TABLET ORAL at 08:51

## 2018-04-08 RX ADMIN — PREGABALIN 1 MG: 75 CAPSULE ORAL at 20:58

## 2018-04-08 RX ADMIN — PANTOPRAZOLE SODIUM 1 MG: 40 TABLET, DELAYED RELEASE ORAL at 06:19

## 2018-04-08 RX ADMIN — VITAMIN D, TAB 1000IU (100/BT) 1 UNIT: 25 TAB at 08:45

## 2018-04-08 RX ADMIN — PREGABALIN 1 MG: 75 CAPSULE ORAL at 08:46

## 2018-04-08 RX ADMIN — CEFEPIME 1 MLS/HR: 1 INJECTION, POWDER, FOR SOLUTION INTRAMUSCULAR; INTRAVENOUS at 12:01

## 2018-04-08 RX ADMIN — AZATHIOPRINE 1 MG: 50 TABLET ORAL at 08:45

## 2018-04-09 LAB — VANCOMYCIN,RANDOM: 13 UG/ML

## 2018-04-09 RX ADMIN — PREGABALIN 1 MG: 75 CAPSULE ORAL at 08:31

## 2018-04-09 RX ADMIN — HYDROCODONE BITARTRATE AND ACETAMINOPHEN 1 TAB: 5; 325 TABLET ORAL at 02:35

## 2018-04-09 RX ADMIN — AZATHIOPRINE 1 MG: 50 TABLET ORAL at 08:31

## 2018-04-09 RX ADMIN — VANCOMYCIN HYDROCHLORIDE 1 MLS/HR: 1 INJECTION, POWDER, LYOPHILIZED, FOR SOLUTION INTRAVENOUS at 08:31

## 2018-04-09 RX ADMIN — CEFEPIME 1 MLS/HR: 1 INJECTION, POWDER, FOR SOLUTION INTRAMUSCULAR; INTRAVENOUS at 11:34

## 2018-04-09 RX ADMIN — PREGABALIN 1 MG: 75 CAPSULE ORAL at 20:39

## 2018-04-09 RX ADMIN — PANTOPRAZOLE SODIUM 1 MG: 40 TABLET, DELAYED RELEASE ORAL at 05:33

## 2018-04-09 RX ADMIN — VITAMIN D, TAB 1000IU (100/BT) 1 UNIT: 25 TAB at 08:31

## 2018-04-09 RX ADMIN — HYDROCODONE BITARTRATE AND ACETAMINOPHEN 1 TAB: 5; 325 TABLET ORAL at 20:42

## 2018-04-10 LAB
ABNORMAL IP MESSAGE: 1
ADD MAN DIFF?: NO
ADD UMIC: YES
ALANINE AMINOTRANSFERASE: 22 IU/L (ref 13–69)
ALBUMIN/GLOBULIN RATIO: 0.93
ALBUMIN: 2.7 G/DL (ref 3.3–4.9)
ALKALINE PHOSPHATASE: 125 IU/L (ref 42–121)
ANION GAP: 13 (ref 8–16)
ASPARTATE AMINO TRANSFERASE: 13 IU/L (ref 15–46)
BASOPHIL #: 0 10^3/UL (ref 0–0.1)
BASOPHILS %: 0.1 % (ref 0–2)
BILIRUBIN,DIRECT: 0 MG/DL (ref 0–0.2)
BILIRUBIN,TOTAL: 0.4 MG/DL (ref 0.2–1.3)
BLOOD UREA NITROGEN: 36 MG/DL (ref 7–20)
CALCIUM: 8.7 MG/DL (ref 8.4–10.2)
CARBON DIOXIDE: 31 MMOL/L (ref 21–31)
CHLORIDE: 98 MMOL/L (ref 97–110)
CREATININE: 2.49 MG/DL (ref 0.44–1)
EOSINOPHILS #: 0.1 10^3/UL (ref 0–0.5)
EOSINOPHILS %: 0.8 % (ref 0–7)
GLOBULIN: 2.9 G/DL (ref 1.3–3.2)
GLUCOSE: 232 MG/DL (ref 70–220)
HEMATOCRIT: 29.2 % (ref 37–47)
HEMOGLOBIN: 9.6 G/DL (ref 12–16)
LYMPHOCYTES #: 0.4 10^3/UL (ref 0.8–2.9)
LYMPHOCYTES %: 4.4 % (ref 15–51)
MAGNESIUM: 1.8 MG/DL (ref 1.7–2.5)
MEAN CORPUSCULAR HEMOGLOBIN: 33.2 PG (ref 29–33)
MEAN CORPUSCULAR HGB CONC: 32.9 G/DL (ref 32–37)
MEAN CORPUSCULAR VOLUME: 101 FL (ref 82–101)
MEAN PLATELET VOLUME: 12.2 FL (ref 7.4–10.4)
MONOCYTE #: 0.3 10^3/UL (ref 0.3–0.9)
MONOCYTES %: 3.2 % (ref 0–11)
NEUTROPHIL #: 8.7 10^3/UL (ref 1.6–7.5)
NEUTROPHILS %: 90.1 % (ref 39–77)
NUCLEATED RED BLOOD CELLS #: 0 10^3/UL (ref 0–0)
NUCLEATED RED BLOOD CELLS%: 0 /100WBC (ref 0–0)
PHOSPHORUS: 3 MG/DL (ref 2.5–4.9)
PLATELET COUNT: 48 10^3/UL (ref 140–415)
POSITIVE DIFF: (no result)
POTASSIUM: 4.2 MMOL/L (ref 3.5–5.1)
RED BLOOD COUNT: 2.89 10^6/UL (ref 4.2–5.4)
RED CELL DISTRIBUTION WIDTH: 13.7 % (ref 11.5–14.5)
SODIUM: 138 MMOL/L (ref 135–144)
TOTAL PROTEIN: 5.6 G/DL (ref 6.1–8.1)
UR ASCORBIC ACID: NEGATIVE MG/DL
UR BACTERIA: (no result) /HPF
UR BILIRUBIN (DIP): NEGATIVE MG/DL
UR BLOOD (DIP): (no result) MG/DL
UR CLARITY: (no result)
UR COLOR: (no result)
UR GLUCOSE (DIP): (no result) MG/DL
UR KETONES (DIP): NEGATIVE MG/DL
UR LEUKOCYTE ESTERASE (DIP): (no result) LEU/UL
UR NITRITE (DIP): NEGATIVE MG/DL
UR PH (DIP): 6 (ref 5–9)
UR RBC: 4 /HPF (ref 0–5)
UR SPECIFIC GRAVITY (DIP): 1.01 (ref 1–1.03)
UR SQUAMOUS EPITHELIAL CELL: (no result) /HPF
UR TOTAL PROTEIN (DIP): (no result) MG/DL
UR UROBILINOGEN (DIP): NEGATIVE MG/DL
UR WBC: 122 /HPF (ref 0–5)
WHITE BLOOD COUNT: 9.6 10^3/UL (ref 4.8–10.8)

## 2018-04-10 RX ADMIN — HYDROCODONE BITARTRATE AND ACETAMINOPHEN 1 TAB: 5; 325 TABLET ORAL at 05:16

## 2018-04-10 RX ADMIN — PREGABALIN 1 MG: 75 CAPSULE ORAL at 09:19

## 2018-04-10 RX ADMIN — VITAMIN D, TAB 1000IU (100/BT) 1 UNIT: 25 TAB at 09:19

## 2018-04-10 RX ADMIN — HYDROCODONE BITARTRATE AND ACETAMINOPHEN 1 TAB: 5; 325 TABLET ORAL at 12:33

## 2018-04-10 RX ADMIN — ACETAMINOPHEN 1 MG: 325 TABLET, FILM COATED ORAL at 09:26

## 2018-04-10 RX ADMIN — Medication 1 CAP: at 22:29

## 2018-04-10 RX ADMIN — PREGABALIN 1 MG: 75 CAPSULE ORAL at 22:29

## 2018-04-10 RX ADMIN — AZATHIOPRINE 1 MG: 50 TABLET ORAL at 09:18

## 2018-04-10 RX ADMIN — PANTOPRAZOLE SODIUM 1 MG: 40 TABLET, DELAYED RELEASE ORAL at 05:16

## 2018-04-11 LAB
ABNORMAL IP MESSAGE: 1
ADD MAN DIFF?: NO
ALANINE AMINOTRANSFERASE: 25 IU/L (ref 13–69)
ALBUMIN/GLOBULIN RATIO: 0.86
ALBUMIN: 2.6 G/DL (ref 3.3–4.9)
ALKALINE PHOSPHATASE: 120 IU/L (ref 42–121)
ANION GAP: 15 (ref 8–16)
ASPARTATE AMINO TRANSFERASE: 15 IU/L (ref 15–46)
BASOPHIL #: 0 10^3/UL (ref 0–0.1)
BASOPHILS %: 0.2 % (ref 0–2)
BILIRUBIN,DIRECT: 0 MG/DL (ref 0–0.2)
BILIRUBIN,TOTAL: 0.4 MG/DL (ref 0.2–1.3)
BLOOD UREA NITROGEN: 58 MG/DL (ref 7–20)
CALCIUM: 8.4 MG/DL (ref 8.4–10.2)
CARBON DIOXIDE: 26 MMOL/L (ref 21–31)
CHLORIDE: 99 MMOL/L (ref 97–110)
CREATININE: 3.7 MG/DL (ref 0.44–1)
EOSINOPHILS #: 0 10^3/UL (ref 0–0.5)
EOSINOPHILS %: 0.3 % (ref 0–7)
FLD CLARITY: (no result)
FLD COLOR: (no result)
FLD MN%: 15.9 %
FLD PMN%: 84.1 %
FLD RBC: (no result) /UL
FLD TYPE: (no result)
FLD VOLUME: 1000 ML
FLD WBC: 410 /CMM
GLOBULIN: 3 G/DL (ref 1.3–3.2)
GLUCOSE: 296 MG/DL (ref 70–220)
HEMATOCRIT: 27 % (ref 37–47)
HEMOGLOBIN: 8.7 G/DL (ref 12–16)
INR: 1.29
LYMPHOCYTES #: 0.3 10^3/UL (ref 0.8–2.9)
LYMPHOCYTES %: 3.2 % (ref 15–51)
MAGNESIUM: 1.9 MG/DL (ref 1.7–2.5)
MEAN CORPUSCULAR HEMOGLOBIN: 33 PG (ref 29–33)
MEAN CORPUSCULAR HGB CONC: 32.2 G/DL (ref 32–37)
MEAN CORPUSCULAR VOLUME: 102.3 FL (ref 82–101)
MEAN PLATELET VOLUME: 12.2 FL (ref 7.4–10.4)
MONOCYTE #: 0.2 10^3/UL (ref 0.3–0.9)
MONOCYTES %: 2.1 % (ref 0–11)
NEUTROPHIL #: 9.2 10^3/UL (ref 1.6–7.5)
NEUTROPHILS %: 93.6 % (ref 39–77)
NUCLEATED RED BLOOD CELLS #: 0 10^3/UL (ref 0–0)
NUCLEATED RED BLOOD CELLS%: 0 /100WBC (ref 0–0)
PARTIAL THROMBOPLASTIN TIME: 39.6 SEC (ref 25–35)
PATH REVIEW?: YES
PHOSPHORUS: 3.8 MG/DL (ref 2.5–4.9)
PLATELET COUNT: 51 10^3/UL (ref 140–415)
POSITIVE DIFF: (no result)
POTASSIUM: 4.4 MMOL/L (ref 3.5–5.1)
PROTIME: 16.3 SEC (ref 11.9–14.9)
PT RATIO: 1.3
RED BLOOD COUNT: 2.64 10^6/UL (ref 4.2–5.4)
RED CELL DISTRIBUTION WIDTH: 13.7 % (ref 11.5–14.5)
SODIUM: 136 MMOL/L (ref 135–144)
TOTAL PROTEIN: 5.6 G/DL (ref 6.1–8.1)
WHITE BLOOD COUNT: 9.8 10^3/UL (ref 4.8–10.8)

## 2018-04-11 RX ADMIN — PREGABALIN 1 MG: 75 CAPSULE ORAL at 20:53

## 2018-04-11 RX ADMIN — ACETAMINOPHEN 1 MG: 325 TABLET, FILM COATED ORAL at 05:03

## 2018-04-11 RX ADMIN — HYDROCODONE BITARTRATE AND ACETAMINOPHEN 1 TAB: 5; 325 TABLET ORAL at 07:43

## 2018-04-11 RX ADMIN — CASTOR OIL AND BALSAM, PERU 1 APPLIC: 788; 87 OINTMENT TOPICAL at 20:52

## 2018-04-11 RX ADMIN — VITAMIN D, TAB 1000IU (100/BT) 1 UNIT: 25 TAB at 10:32

## 2018-04-11 RX ADMIN — HYDROCODONE BITARTRATE AND ACETAMINOPHEN 1 TAB: 5; 325 TABLET ORAL at 01:06

## 2018-04-11 RX ADMIN — Medication 1 CAP: at 10:31

## 2018-04-11 RX ADMIN — AZATHIOPRINE 1 MG: 50 TABLET ORAL at 10:31

## 2018-04-11 RX ADMIN — HYDROCODONE BITARTRATE AND ACETAMINOPHEN 1 TAB: 5; 325 TABLET ORAL at 20:53

## 2018-04-11 RX ADMIN — PANTOPRAZOLE SODIUM 1 MG: 40 TABLET, DELAYED RELEASE ORAL at 05:03

## 2018-04-11 RX ADMIN — PREGABALIN 1 MG: 75 CAPSULE ORAL at 10:31

## 2018-04-11 RX ADMIN — Medication 1 CAP: at 20:53

## 2018-04-11 RX ADMIN — HYDROCODONE BITARTRATE AND ACETAMINOPHEN 1 TAB: 5; 325 TABLET ORAL at 13:45

## 2018-04-11 RX ADMIN — LIDOCAINE HYDROCHLORIDE 1 ML: 10 INJECTION, SOLUTION INFILTRATION; PERINEURAL at 09:22

## 2018-04-12 LAB — VANCOMYCIN,RANDOM: 11.9 UG/ML

## 2018-04-12 RX ADMIN — CASTOR OIL AND BALSAM, PERU 1 APPLIC: 788; 87 OINTMENT TOPICAL at 20:17

## 2018-04-12 RX ADMIN — Medication 1 CAP: at 20:12

## 2018-04-12 RX ADMIN — VITAMIN D, TAB 1000IU (100/BT) 1 UNIT: 25 TAB at 08:58

## 2018-04-12 RX ADMIN — CASTOR OIL AND BALSAM, PERU 1 APPLIC: 788; 87 OINTMENT TOPICAL at 09:00

## 2018-04-12 RX ADMIN — AZATHIOPRINE 1 MG: 50 TABLET ORAL at 08:58

## 2018-04-12 RX ADMIN — INSULIN ASPART 1 UNIT: 100 INJECTION, SOLUTION INTRAVENOUS; SUBCUTANEOUS at 20:12

## 2018-04-12 RX ADMIN — Medication 1 CAP: at 08:58

## 2018-04-12 RX ADMIN — HYDROCODONE BITARTRATE AND ACETAMINOPHEN 1 TAB: 5; 325 TABLET ORAL at 20:12

## 2018-04-12 RX ADMIN — PREGABALIN 1 MG: 75 CAPSULE ORAL at 08:58

## 2018-04-12 RX ADMIN — VANCOMYCIN HYDROCHLORIDE 1 MLS/HR: 1 INJECTION, POWDER, LYOPHILIZED, FOR SOLUTION INTRAVENOUS at 12:50

## 2018-04-12 RX ADMIN — HYDRALAZINE HYDROCHLORIDE 1 MG: 20 INJECTION INTRAMUSCULAR; INTRAVENOUS at 23:52

## 2018-04-12 RX ADMIN — ACETAMINOPHEN 1 MG: 325 TABLET, FILM COATED ORAL at 23:52

## 2018-04-12 RX ADMIN — PREGABALIN 1 MG: 75 CAPSULE ORAL at 22:23

## 2018-04-12 RX ADMIN — ACETAMINOPHEN 1 MG: 325 TABLET, FILM COATED ORAL at 08:59

## 2018-04-12 RX ADMIN — HYDROCODONE BITARTRATE AND ACETAMINOPHEN 1 TAB: 5; 325 TABLET ORAL at 09:43

## 2018-04-12 RX ADMIN — HYDROCODONE BITARTRATE AND ACETAMINOPHEN 1 TAB: 5; 325 TABLET ORAL at 03:45

## 2018-04-12 RX ADMIN — PANTOPRAZOLE SODIUM 1 MG: 40 TABLET, DELAYED RELEASE ORAL at 06:54

## 2018-04-12 RX ADMIN — INSULIN ASPART 1 UNIT: 100 INJECTION, SOLUTION INTRAVENOUS; SUBCUTANEOUS at 23:23

## 2018-04-12 RX ADMIN — INSULIN ASPART 1 UNIT: 100 INJECTION, SOLUTION INTRAVENOUS; SUBCUTANEOUS at 18:05

## 2018-04-13 RX ADMIN — MORPHINE SULFATE 1 MG: 2 INJECTION, SOLUTION INTRAMUSCULAR; INTRAVENOUS at 04:12

## 2018-04-13 RX ADMIN — HYDROCODONE BITARTRATE AND ACETAMINOPHEN 1 TAB: 5; 325 TABLET ORAL at 19:32

## 2018-04-13 RX ADMIN — VASOPRESSIN 1 ML/7 S: 20 INJECTION, SOLUTION INTRAMUSCULAR; SUBCUTANEOUS at 10:04

## 2018-04-13 RX ADMIN — SODIUM CHLORIDE 1 ML: 9 INJECTION, SOLUTION INTRAMUSCULAR; INTRAVENOUS; SUBCUTANEOUS at 10:02

## 2018-04-13 RX ADMIN — INSULIN ASPART 1 UNIT: 100 INJECTION, SOLUTION INTRAVENOUS; SUBCUTANEOUS at 17:39

## 2018-04-13 RX ADMIN — PREGABALIN 1 MG: 75 CAPSULE ORAL at 08:13

## 2018-04-13 RX ADMIN — HYDROCODONE BITARTRATE AND ACETAMINOPHEN 1 TAB: 5; 325 TABLET ORAL at 02:28

## 2018-04-13 RX ADMIN — VITAMIN A AND D 1 APPLIC: 30.8 OINTMENT TOPICAL at 09:00

## 2018-04-13 RX ADMIN — AZATHIOPRINE 1 MG: 50 TABLET ORAL at 08:13

## 2018-04-13 RX ADMIN — CASTOR OIL AND BALSAM, PERU 1 APPLIC: 788; 87 OINTMENT TOPICAL at 21:05

## 2018-04-13 RX ADMIN — INSULIN ASPART 1 UNIT: 100 INJECTION, SOLUTION INTRAVENOUS; SUBCUTANEOUS at 12:29

## 2018-04-13 RX ADMIN — HYDROCODONE BITARTRATE AND ACETAMINOPHEN 1 TAB: 5; 325 TABLET ORAL at 12:58

## 2018-04-13 RX ADMIN — PREGABALIN 1 MG: 75 CAPSULE ORAL at 21:05

## 2018-04-13 RX ADMIN — Medication 1 CAP: at 08:12

## 2018-04-13 RX ADMIN — INSULIN ASPART 1 UNIT: 100 INJECTION, SOLUTION INTRAVENOUS; SUBCUTANEOUS at 08:12

## 2018-04-13 RX ADMIN — VITAMIN D, TAB 1000IU (100/BT) 1 UNIT: 25 TAB at 08:12

## 2018-04-13 RX ADMIN — CASTOR OIL AND BALSAM, PERU 1 APPLIC: 788; 87 OINTMENT TOPICAL at 08:13

## 2018-04-13 RX ADMIN — INSULIN ASPART 1 UNIT: 100 INJECTION, SOLUTION INTRAVENOUS; SUBCUTANEOUS at 21:07

## 2018-04-13 RX ADMIN — LIDOCAINE HYDROCHLORIDE 1 MLS/HR: 10 INJECTION, SOLUTION EPIDURAL; INFILTRATION; INTRACAUDAL; PERINEURAL at 02:28

## 2018-04-13 RX ADMIN — PANTOPRAZOLE SODIUM 1 MG: 40 TABLET, DELAYED RELEASE ORAL at 06:26

## 2018-04-13 RX ADMIN — HYDROCODONE BITARTRATE AND ACETAMINOPHEN 1 TAB: 5; 325 TABLET ORAL at 06:25

## 2018-04-13 RX ADMIN — Medication 1 CAP: at 21:05

## 2018-04-13 RX ADMIN — IODIXANOL 1 ML: 320 INJECTION, SOLUTION INTRAVASCULAR at 10:04

## 2018-04-14 LAB
ANION GAP: 15 (ref 8–16)
BLOOD UREA NITROGEN: 41 MG/DL (ref 7–20)
CALCIUM: 8.3 MG/DL (ref 8.4–10.2)
CARBON DIOXIDE: 29 MMOL/L (ref 21–31)
CHLORIDE: 101 MMOL/L (ref 97–110)
CREATININE: 2.6 MG/DL (ref 0.44–1)
GLUCOSE: 171 MG/DL (ref 70–220)
HEMOGLOBIN A1C: 6.5 % (ref 0–5.9)
POTASSIUM: 4.6 MMOL/L (ref 3.5–5.1)
SODIUM: 140 MMOL/L (ref 135–144)

## 2018-04-14 RX ADMIN — CASTOR OIL AND BALSAM, PERU 1 APPLIC: 788; 87 OINTMENT TOPICAL at 08:47

## 2018-04-14 RX ADMIN — CASTOR OIL AND BALSAM, PERU 1 APPLIC: 788; 87 OINTMENT TOPICAL at 22:33

## 2018-04-14 RX ADMIN — AZATHIOPRINE 1 MG: 50 TABLET ORAL at 08:36

## 2018-04-14 RX ADMIN — INSULIN ASPART 1 UNIT: 100 INJECTION, SOLUTION INTRAVENOUS; SUBCUTANEOUS at 12:37

## 2018-04-14 RX ADMIN — INSULIN ASPART 1 UNIT: 100 INJECTION, SOLUTION INTRAVENOUS; SUBCUTANEOUS at 17:56

## 2018-04-14 RX ADMIN — PREGABALIN 1 MG: 75 CAPSULE ORAL at 22:32

## 2018-04-14 RX ADMIN — PANTOPRAZOLE SODIUM 1 MG: 40 TABLET, DELAYED RELEASE ORAL at 05:48

## 2018-04-14 RX ADMIN — HYDROCODONE BITARTRATE AND ACETAMINOPHEN 1 TAB: 5; 325 TABLET ORAL at 02:39

## 2018-04-14 RX ADMIN — PREGABALIN 1 MG: 75 CAPSULE ORAL at 08:36

## 2018-04-14 RX ADMIN — HYDROCODONE BITARTRATE AND ACETAMINOPHEN 1 TAB: 5; 325 TABLET ORAL at 00:28

## 2018-04-14 RX ADMIN — VITAMIN A AND D 1 APPLIC: 30.8 OINTMENT TOPICAL at 08:35

## 2018-04-14 RX ADMIN — HYDROCODONE BITARTRATE AND ACETAMINOPHEN 1 TAB: 5; 325 TABLET ORAL at 08:35

## 2018-04-14 RX ADMIN — VITAMIN D, TAB 1000IU (100/BT) 1 UNIT: 25 TAB at 08:36

## 2018-04-14 RX ADMIN — HYDROCODONE BITARTRATE AND ACETAMINOPHEN 1 TAB: 5; 325 TABLET ORAL at 15:58

## 2018-04-14 RX ADMIN — INSULIN ASPART 1 UNIT: 100 INJECTION, SOLUTION INTRAVENOUS; SUBCUTANEOUS at 08:35

## 2018-04-14 RX ADMIN — HYDROCODONE BITARTRATE AND ACETAMINOPHEN 1 TAB: 5; 325 TABLET ORAL at 20:16

## 2018-04-14 RX ADMIN — INSULIN ASPART 1 UNIT: 100 INJECTION, SOLUTION INTRAVENOUS; SUBCUTANEOUS at 22:32

## 2018-04-14 RX ADMIN — Medication 1 CAP: at 08:36

## 2018-04-14 RX ADMIN — MORPHINE SULFATE 1 MG: 2 INJECTION, SOLUTION INTRAMUSCULAR; INTRAVENOUS at 02:38

## 2018-04-14 RX ADMIN — Medication 1 CAP: at 22:32

## 2018-04-15 LAB
ABNORMAL IP MESSAGE: 1
ADD MAN DIFF?: NO
BASOPHIL #: 0 10^3/UL (ref 0–0.1)
BASOPHILS %: 0.1 % (ref 0–2)
EOSINOPHILS #: 0.1 10^3/UL (ref 0–0.5)
EOSINOPHILS %: 2.1 % (ref 0–7)
HEMATOCRIT: 23.5 % (ref 37–47)
HEMOGLOBIN: 7.7 G/DL (ref 12–16)
LYMPHOCYTES #: 0.5 10^3/UL (ref 0.8–2.9)
LYMPHOCYTES %: 6.8 % (ref 15–51)
MEAN CORPUSCULAR HEMOGLOBIN: 33.9 PG (ref 29–33)
MEAN CORPUSCULAR HGB CONC: 32.8 G/DL (ref 32–37)
MEAN CORPUSCULAR VOLUME: 103.5 FL (ref 82–101)
MEAN PLATELET VOLUME: 11.9 FL (ref 7.4–10.4)
MONOCYTE #: 0.1 10^3/UL (ref 0.3–0.9)
MONOCYTES %: 1.9 % (ref 0–11)
NEUTROPHIL #: 6 10^3/UL (ref 1.6–7.5)
NEUTROPHILS %: 88.5 % (ref 39–77)
NUCLEATED RED BLOOD CELLS #: 0 10^3/UL (ref 0–0)
NUCLEATED RED BLOOD CELLS%: 0 /100WBC (ref 0–0)
PLATELET COUNT: 82 10^3/UL (ref 140–415)
POSITIVE DIFF: (no result)
RED BLOOD COUNT: 2.27 10^6/UL (ref 4.2–5.4)
RED CELL DISTRIBUTION WIDTH: 14 % (ref 11.5–14.5)
WHITE BLOOD COUNT: 6.8 10^3/UL (ref 4.8–10.8)

## 2018-04-15 RX ADMIN — INSULIN ASPART 1 UNIT: 100 INJECTION, SOLUTION INTRAVENOUS; SUBCUTANEOUS at 12:43

## 2018-04-15 RX ADMIN — CASTOR OIL AND BALSAM, PERU 1 APPLIC: 788; 87 OINTMENT TOPICAL at 20:18

## 2018-04-15 RX ADMIN — INSULIN ASPART 1 UNIT: 100 INJECTION, SOLUTION INTRAVENOUS; SUBCUTANEOUS at 20:21

## 2018-04-15 RX ADMIN — PANTOPRAZOLE SODIUM 1 MG: 40 TABLET, DELAYED RELEASE ORAL at 05:47

## 2018-04-15 RX ADMIN — HYDROCODONE BITARTRATE AND ACETAMINOPHEN 1 TAB: 5; 325 TABLET ORAL at 05:47

## 2018-04-15 RX ADMIN — HYDROCODONE BITARTRATE AND ACETAMINOPHEN 1 TAB: 5; 325 TABLET ORAL at 19:54

## 2018-04-15 RX ADMIN — VITAMIN D, TAB 1000IU (100/BT) 1 UNIT: 25 TAB at 08:12

## 2018-04-15 RX ADMIN — INSULIN ASPART 1 UNIT: 100 INJECTION, SOLUTION INTRAVENOUS; SUBCUTANEOUS at 17:42

## 2018-04-15 RX ADMIN — VITAMIN A AND D 1 APPLIC: 30.8 OINTMENT TOPICAL at 08:12

## 2018-04-15 RX ADMIN — ACETAMINOPHEN 1 MG: 325 TABLET, FILM COATED ORAL at 17:40

## 2018-04-15 RX ADMIN — PREGABALIN 1 MG: 75 CAPSULE ORAL at 08:12

## 2018-04-15 RX ADMIN — PREGABALIN 1 MG: 75 CAPSULE ORAL at 20:18

## 2018-04-15 RX ADMIN — Medication 1 CAP: at 20:18

## 2018-04-15 RX ADMIN — INSULIN ASPART 1 UNIT: 100 INJECTION, SOLUTION INTRAVENOUS; SUBCUTANEOUS at 08:16

## 2018-04-15 RX ADMIN — HYDROCODONE BITARTRATE AND ACETAMINOPHEN 1 TAB: 5; 325 TABLET ORAL at 14:44

## 2018-04-15 RX ADMIN — CASTOR OIL AND BALSAM, PERU 1 APPLIC: 788; 87 OINTMENT TOPICAL at 08:13

## 2018-04-15 RX ADMIN — HYDROCODONE BITARTRATE AND ACETAMINOPHEN 1 TAB: 5; 325 TABLET ORAL at 10:26

## 2018-04-15 RX ADMIN — AZATHIOPRINE 1 MG: 50 TABLET ORAL at 08:12

## 2018-04-15 RX ADMIN — VANCOMYCIN HYDROCHLORIDE 1 MLS/HR: 1 INJECTION, POWDER, LYOPHILIZED, FOR SOLUTION INTRAVENOUS at 12:43

## 2018-04-15 RX ADMIN — ACETAMINOPHEN 1 MG: 325 TABLET, FILM COATED ORAL at 08:16

## 2018-04-15 RX ADMIN — Medication 1 CAP: at 08:12

## 2018-04-16 LAB
ABNORMAL IP MESSAGE: 1
ADD MAN DIFF?: NO
ALANINE AMINOTRANSFERASE: 18 IU/L (ref 13–69)
ALBUMIN/GLOBULIN RATIO: 0.88
ALBUMIN: 2.4 G/DL (ref 3.3–4.9)
ALKALINE PHOSPHATASE: 134 IU/L (ref 42–121)
AMMONIA: < 9 UMOL/L (ref 9–30)
ANION GAP: 16 (ref 8–16)
ANION GAP: 18 (ref 8–16)
ASPARTATE AMINO TRANSFERASE: 11 IU/L (ref 15–46)
BASOPHIL #: 0 10^3/UL (ref 0–0.1)
BASOPHILS %: 0.1 % (ref 0–2)
BILIRUBIN,DIRECT: 0 MG/DL (ref 0–0.2)
BILIRUBIN,TOTAL: 0.1 MG/DL (ref 0.2–1.3)
BLOOD UREA NITROGEN: 61 MG/DL (ref 7–20)
BLOOD UREA NITROGEN: 73 MG/DL (ref 7–20)
CALCIUM: 8.5 MG/DL (ref 8.4–10.2)
CALCIUM: 8.9 MG/DL (ref 8.4–10.2)
CARBON DIOXIDE: 26 MMOL/L (ref 21–31)
CARBON DIOXIDE: 27 MMOL/L (ref 21–31)
CHLORIDE: 101 MMOL/L (ref 97–110)
CHLORIDE: 99 MMOL/L (ref 97–110)
CREATININE: 3.25 MG/DL (ref 0.44–1)
CREATININE: 4.38 MG/DL (ref 0.44–1)
EOSINOPHILS #: 0.2 10^3/UL (ref 0–0.5)
EOSINOPHILS %: 2.7 % (ref 0–7)
GLOBULIN: 2.7 G/DL (ref 1.3–3.2)
GLUCOSE: 136 MG/DL (ref 70–220)
GLUCOSE: 178 MG/DL (ref 70–220)
HEMATOCRIT: 23.3 % (ref 37–47)
HEMOGLOBIN: 7.5 G/DL (ref 12–16)
LYMPHOCYTES #: 0.4 10^3/UL (ref 0.8–2.9)
LYMPHOCYTES %: 5.9 % (ref 15–51)
MEAN CORPUSCULAR HEMOGLOBIN: 33.6 PG (ref 29–33)
MEAN CORPUSCULAR HGB CONC: 32.2 G/DL (ref 32–37)
MEAN CORPUSCULAR VOLUME: 104.5 FL (ref 82–101)
MEAN PLATELET VOLUME: 11.6 FL (ref 7.4–10.4)
MONOCYTE #: 0.1 10^3/UL (ref 0.3–0.9)
MONOCYTES %: 2 % (ref 0–11)
NEUTROPHIL #: 6.3 10^3/UL (ref 1.6–7.5)
NEUTROPHILS %: 88.3 % (ref 39–77)
NUCLEATED RED BLOOD CELLS #: 0 10^3/UL (ref 0–0)
NUCLEATED RED BLOOD CELLS%: 0 /100WBC (ref 0–0)
PF4 HEPARIN CMPLX AB SER QL: NEGATIVE
PLATELET COUNT: 90 10^3/UL (ref 140–415)
POSITIVE DIFF: (no result)
POTASSIUM: 5.5 MMOL/L (ref 3.5–5.1)
POTASSIUM: 5.7 MMOL/L (ref 3.5–5.1)
RED BLOOD COUNT: 2.23 10^6/UL (ref 4.2–5.4)
RED CELL DISTRIBUTION WIDTH: 13.8 % (ref 11.5–14.5)
SODIUM: 137 MMOL/L (ref 135–144)
SODIUM: 138 MMOL/L (ref 135–144)
TOTAL PROTEIN: 5.1 G/DL (ref 6.1–8.1)
WHITE BLOOD COUNT: 7.1 10^3/UL (ref 4.8–10.8)

## 2018-04-16 PROCEDURE — 5A1D70Z PERFORMANCE OF URINARY FILTRATION, INTERMITTENT, LESS THAN 6 HOURS PER DAY: ICD-10-PCS

## 2018-04-16 PROCEDURE — B246ZZ4 ULTRASONOGRAPHY OF RIGHT AND LEFT HEART, TRANSESOPHAGEAL: ICD-10-PCS

## 2018-04-16 PROCEDURE — 30233N1 TRANSFUSION OF NONAUTOLOGOUS RED BLOOD CELLS INTO PERIPHERAL VEIN, PERCUTANEOUS APPROACH: ICD-10-PCS

## 2018-04-16 PROCEDURE — 30233R1 TRANSFUSION OF NONAUTOLOGOUS PLATELETS INTO PERIPHERAL VEIN, PERCUTANEOUS APPROACH: ICD-10-PCS

## 2018-04-16 PROCEDURE — 0W993ZX DRAINAGE OF RIGHT PLEURAL CAVITY, PERCUTANEOUS APPROACH, DIAGNOSTIC: ICD-10-PCS

## 2018-04-16 PROCEDURE — 0W9G3ZX DRAINAGE OF PERITONEAL CAVITY, PERCUTANEOUS APPROACH, DIAGNOSTIC: ICD-10-PCS

## 2018-04-16 RX ADMIN — PREGABALIN 1 MG: 75 CAPSULE ORAL at 09:48

## 2018-04-16 RX ADMIN — VITAMIN D, TAB 1000IU (100/BT) 1 UNIT: 25 TAB at 09:48

## 2018-04-16 RX ADMIN — INSULIN ASPART 1 UNIT: 100 INJECTION, SOLUTION INTRAVENOUS; SUBCUTANEOUS at 01:06

## 2018-04-16 RX ADMIN — AZATHIOPRINE 1 MG: 50 TABLET ORAL at 09:48

## 2018-04-16 RX ADMIN — SODIUM POLYSTYRENE SULFONATE 1 GM: 15 SUSPENSION ORAL; RECTAL at 20:54

## 2018-04-16 RX ADMIN — INSULIN ASPART 1 UNIT: 100 INJECTION, SOLUTION INTRAVENOUS; SUBCUTANEOUS at 17:25

## 2018-04-16 RX ADMIN — INSULIN ASPART 1 UNIT: 100 INJECTION, SOLUTION INTRAVENOUS; SUBCUTANEOUS at 12:23

## 2018-04-16 RX ADMIN — HYDROCODONE BITARTRATE AND ACETAMINOPHEN 1 TAB: 5; 325 TABLET ORAL at 08:44

## 2018-04-16 RX ADMIN — VITAMIN A AND D 1 APPLIC: 30.8 OINTMENT TOPICAL at 09:49

## 2018-04-16 RX ADMIN — Medication 1 CAP: at 12:55

## 2018-04-16 RX ADMIN — ALBUMIN (HUMAN) 1 MLS/HR: 0.25 INJECTION, SOLUTION INTRAVENOUS at 15:35

## 2018-04-16 RX ADMIN — PREGABALIN 1 MG: 75 CAPSULE ORAL at 20:49

## 2018-04-16 RX ADMIN — Medication 1 CAP: at 09:48

## 2018-04-16 RX ADMIN — INSULIN ASPART 1 UNIT: 100 INJECTION, SOLUTION INTRAVENOUS; SUBCUTANEOUS at 09:58

## 2018-04-16 RX ADMIN — CASTOR OIL AND BALSAM, PERU 1 APPLIC: 788; 87 OINTMENT TOPICAL at 09:59

## 2018-04-16 RX ADMIN — HYDROCODONE BITARTRATE AND ACETAMINOPHEN 1 TAB: 5; 325 TABLET ORAL at 03:38

## 2018-04-16 RX ADMIN — Medication 1 CAP: at 20:49

## 2018-04-16 RX ADMIN — CASTOR OIL AND BALSAM, PERU 1 APPLIC: 788; 87 OINTMENT TOPICAL at 21:06

## 2018-04-16 RX ADMIN — PANTOPRAZOLE SODIUM 1 MG: 40 TABLET, DELAYED RELEASE ORAL at 05:39

## 2018-04-16 RX ADMIN — EPOETIN ALFA 1 UNITS: 3000 SOLUTION INTRAVENOUS; SUBCUTANEOUS at 20:52

## 2018-04-16 RX ADMIN — INSULIN ASPART 1 UNIT: 100 INJECTION, SOLUTION INTRAVENOUS; SUBCUTANEOUS at 04:42

## 2018-04-16 RX ADMIN — INSULIN ASPART 1 UNIT: 100 INJECTION, SOLUTION INTRAVENOUS; SUBCUTANEOUS at 21:03

## 2018-04-16 RX ADMIN — HYDROCODONE BITARTRATE AND ACETAMINOPHEN 1 TAB: 5; 325 TABLET ORAL at 12:55

## 2018-04-16 RX ADMIN — ALBUMIN (HUMAN) 1 MLS/HR: 0.25 INJECTION, SOLUTION INTRAVENOUS at 13:48

## 2018-04-17 LAB
ABNORMAL IP MESSAGE: 1
ABNORMAL IP MESSAGE: 1
ADD MAN DIFF?: NO
ADD MAN DIFF?: NO
ALANINE AMINOTRANSFERASE: 18 IU/L (ref 13–69)
ALBUMIN/GLOBULIN RATIO: 0.96
ALBUMIN: 2.9 G/DL (ref 3.3–4.9)
ALKALINE PHOSPHATASE: 136 IU/L (ref 42–121)
ANION GAP: 20 (ref 8–16)
ASPARTATE AMINO TRANSFERASE: 16 IU/L (ref 15–46)
BASOPHIL #: 0 10^3/UL (ref 0–0.1)
BASOPHIL #: 0 10^3/UL (ref 0–0.1)
BASOPHILS %: 0 % (ref 0–2)
BASOPHILS %: 0.2 % (ref 0–2)
BILIRUBIN,DIRECT: 0.7 MG/DL (ref 0–0.2)
BILIRUBIN,TOTAL: 1.5 MG/DL (ref 0.2–1.3)
BLOOD UREA NITROGEN: 71 MG/DL (ref 7–20)
CALCIUM: 8.8 MG/DL (ref 8.4–10.2)
CARBON DIOXIDE: 23 MMOL/L (ref 21–31)
CHLORIDE: 100 MMOL/L (ref 97–110)
CREATININE: 4.04 MG/DL (ref 0.44–1)
EOSINOPHILS #: 0 10^3/UL (ref 0–0.5)
EOSINOPHILS #: 0.1 10^3/UL (ref 0–0.5)
EOSINOPHILS %: 0.2 % (ref 0–7)
EOSINOPHILS %: 1.4 % (ref 0–7)
GLOBULIN: 3 G/DL (ref 1.3–3.2)
GLUCOSE: 269 MG/DL (ref 70–220)
HEMATOCRIT: 20.7 % (ref 37–47)
HEMATOCRIT: 24 % (ref 37–47)
HEMOGLOBIN: 6.5 G/DL (ref 12–16)
HEMOGLOBIN: 8.1 G/DL (ref 12–16)
IMMEDIATE SPIN CROSSMATCH: 1 1
LYMPHOCYTES #: 0.2 10^3/UL (ref 0.8–2.9)
LYMPHOCYTES #: 0.4 10^3/UL (ref 0.8–2.9)
LYMPHOCYTES %: 4.2 % (ref 15–51)
LYMPHOCYTES %: 7 % (ref 15–51)
MEAN CORPUSCULAR HEMOGLOBIN: 32.8 PG (ref 29–33)
MEAN CORPUSCULAR HEMOGLOBIN: 33.3 PG (ref 29–33)
MEAN CORPUSCULAR HGB CONC: 31.4 G/DL (ref 32–37)
MEAN CORPUSCULAR HGB CONC: 33.8 G/DL (ref 32–37)
MEAN CORPUSCULAR VOLUME: 104.5 FL (ref 82–101)
MEAN CORPUSCULAR VOLUME: 98.8 FL (ref 82–101)
MEAN PLATELET VOLUME: 11.5 FL (ref 7.4–10.4)
MEAN PLATELET VOLUME: 11.7 FL (ref 7.4–10.4)
MONOCYTE #: 0.1 10^3/UL (ref 0.3–0.9)
MONOCYTE #: 0.2 10^3/UL (ref 0.3–0.9)
MONOCYTES %: 2.5 % (ref 0–11)
MONOCYTES %: 3 % (ref 0–11)
NEUTROPHIL #: 4.4 10^3/UL (ref 1.6–7.5)
NEUTROPHIL #: 4.4 10^3/UL (ref 1.6–7.5)
NEUTROPHILS %: 87.6 % (ref 39–77)
NEUTROPHILS %: 92.1 % (ref 39–77)
NUCLEATED RED BLOOD CELLS #: 0 10^3/UL (ref 0–0)
NUCLEATED RED BLOOD CELLS #: 0 10^3/UL (ref 0–0)
NUCLEATED RED BLOOD CELLS%: 0 /100WBC (ref 0–0)
NUCLEATED RED BLOOD CELLS%: 0 /100WBC (ref 0–0)
PLATELET COUNT: 92 10^3/UL (ref 140–415)
PLATELET COUNT: 94 10^3/UL (ref 140–415)
POSITIVE DIFF: (no result)
POSITIVE DIFF: (no result)
POTASSIUM: 5.5 MMOL/L (ref 3.5–5.1)
RED BLOOD COUNT: 1.98 10^6/UL (ref 4.2–5.4)
RED BLOOD COUNT: 2.43 10^6/UL (ref 4.2–5.4)
RED CELL DISTRIBUTION WIDTH: 14 % (ref 11.5–14.5)
RED CELL DISTRIBUTION WIDTH: 17.6 % (ref 11.5–14.5)
SODIUM: 137 MMOL/L (ref 135–144)
TOTAL PROTEIN: 5.9 G/DL (ref 6.1–8.1)
WHITE BLOOD COUNT: 4.8 10^3/UL (ref 4.8–10.8)
WHITE BLOOD COUNT: 5 10^3/UL (ref 4.8–10.8)

## 2018-04-17 RX ADMIN — VASOPRESSIN 1 ML/S: 20 INJECTION, SOLUTION INTRAMUSCULAR; SUBCUTANEOUS at 16:57

## 2018-04-17 RX ADMIN — VITAMIN D, TAB 1000IU (100/BT) 1 UNIT: 25 TAB at 08:39

## 2018-04-17 RX ADMIN — PANTOPRAZOLE SODIUM 1 MG: 40 TABLET, DELAYED RELEASE ORAL at 05:48

## 2018-04-17 RX ADMIN — RIFAMPIN 1 MG: 300 CAPSULE ORAL at 11:52

## 2018-04-17 RX ADMIN — CASTOR OIL AND BALSAM, PERU 1 APPLIC: 788; 87 OINTMENT TOPICAL at 21:00

## 2018-04-17 RX ADMIN — PREGABALIN 1 MG: 75 CAPSULE ORAL at 08:40

## 2018-04-17 RX ADMIN — IODIXANOL 1 ML: 320 INJECTION, SOLUTION INTRAVASCULAR at 16:58

## 2018-04-17 RX ADMIN — INSULIN ASPART 1 UNIT: 100 INJECTION, SOLUTION INTRAVENOUS; SUBCUTANEOUS at 11:53

## 2018-04-17 RX ADMIN — INSULIN ASPART 1 UNIT: 100 INJECTION, SOLUTION INTRAVENOUS; SUBCUTANEOUS at 08:44

## 2018-04-17 RX ADMIN — INSULIN ASPART 1 UNIT: 100 INJECTION, SOLUTION INTRAVENOUS; SUBCUTANEOUS at 17:38

## 2018-04-17 RX ADMIN — VITAMIN A AND D 1 APPLIC: 30.8 OINTMENT TOPICAL at 08:41

## 2018-04-17 RX ADMIN — Medication 1 CAP: at 12:00

## 2018-04-17 RX ADMIN — HYDROCODONE BITARTRATE AND ACETAMINOPHEN 1 TAB: 5; 325 TABLET ORAL at 10:03

## 2018-04-17 RX ADMIN — AZATHIOPRINE 1 MG: 50 TABLET ORAL at 08:40

## 2018-04-17 RX ADMIN — Medication 1 CAP: at 08:40

## 2018-04-17 RX ADMIN — CASTOR OIL AND BALSAM, PERU 1 APPLIC: 788; 87 OINTMENT TOPICAL at 08:41

## 2018-04-17 RX ADMIN — CLINDAMYCIN IN 5 PERCENT DEXTROSE 1 MLS/HR: 12 INJECTION, SOLUTION INTRAVENOUS at 15:39

## 2018-04-17 RX ADMIN — HYDROCODONE BITARTRATE AND ACETAMINOPHEN 1 TAB: 5; 325 TABLET ORAL at 17:37

## 2018-04-17 RX ADMIN — SODIUM CHLORIDE 1 ML: 9 INJECTION, SOLUTION INTRAMUSCULAR; INTRAVENOUS; SUBCUTANEOUS at 16:57

## 2018-04-17 RX ADMIN — HYDROCODONE BITARTRATE AND ACETAMINOPHEN 1 TAB: 5; 325 TABLET ORAL at 05:50

## 2018-04-18 LAB
ABNORMAL IP MESSAGE: 1
ADD MAN DIFF?: NO
ALBUMIN: 2.4 G/DL (ref 3.3–4.9)
ANION GAP: 13 (ref 8–16)
BASOPHIL #: 0 10^3/UL (ref 0–0.1)
BASOPHILS %: 0.3 % (ref 0–2)
BLOOD UREA NITROGEN: 39 MG/DL (ref 7–20)
CALCIUM: 8.2 MG/DL (ref 8.4–10.2)
CARBON DIOXIDE: 32 MMOL/L (ref 21–31)
CHLORIDE: 98 MMOL/L (ref 97–110)
CREATININE: 2.5 MG/DL (ref 0.44–1)
EOSINOPHILS #: 0.1 10^3/UL (ref 0–0.5)
EOSINOPHILS %: 3.6 % (ref 0–7)
GLUCOSE: 140 MG/DL (ref 70–220)
HEMATOCRIT: 22.2 % (ref 37–47)
HEMOGLOBIN: 7.3 G/DL (ref 12–16)
INR: 1.18
LYMPHOCYTES #: 0.4 10^3/UL (ref 0.8–2.9)
LYMPHOCYTES %: 8.9 % (ref 15–51)
MAGNESIUM: 1.8 MG/DL (ref 1.7–2.5)
MEAN CORPUSCULAR HEMOGLOBIN: 32.3 PG (ref 29–33)
MEAN CORPUSCULAR HGB CONC: 32.9 G/DL (ref 32–37)
MEAN CORPUSCULAR VOLUME: 98.2 FL (ref 82–101)
MEAN PLATELET VOLUME: 11.4 FL (ref 7.4–10.4)
MONOCYTE #: 0.1 10^3/UL (ref 0.3–0.9)
MONOCYTES %: 3.3 % (ref 0–11)
NEUTROPHIL #: 3.3 10^3/UL (ref 1.6–7.5)
NEUTROPHILS %: 83.1 % (ref 39–77)
NUCLEATED RED BLOOD CELLS #: 0 10^3/UL (ref 0–0)
NUCLEATED RED BLOOD CELLS%: 0 /100WBC (ref 0–0)
PHOSPHORUS: 4 MG/DL (ref 2.5–4.9)
PLATELET COUNT: 90 10^3/UL (ref 140–415)
POSITIVE DIFF: (no result)
POTASSIUM: 3.6 MMOL/L (ref 3.5–5.1)
PROTIME: 15.2 SEC (ref 11.9–14.9)
PT RATIO: 1.2
RED BLOOD COUNT: 2.26 10^6/UL (ref 4.2–5.4)
RED CELL DISTRIBUTION WIDTH: 17.9 % (ref 11.5–14.5)
SODIUM: 139 MMOL/L (ref 135–144)
WHITE BLOOD COUNT: 3.9 10^3/UL (ref 4.8–10.8)

## 2018-04-18 RX ADMIN — HYDROCODONE BITARTRATE AND ACETAMINOPHEN 1 TAB: 5; 325 TABLET ORAL at 00:41

## 2018-04-18 RX ADMIN — INSULIN ASPART 1 UNIT: 100 INJECTION, SOLUTION INTRAVENOUS; SUBCUTANEOUS at 12:19

## 2018-04-18 RX ADMIN — CLINDAMYCIN IN 5 PERCENT DEXTROSE 1 MLS/HR: 12 INJECTION, SOLUTION INTRAVENOUS at 05:31

## 2018-04-18 RX ADMIN — CASTOR OIL AND BALSAM, PERU 1 APPLIC: 788; 87 OINTMENT TOPICAL at 21:00

## 2018-04-18 RX ADMIN — VITAMIN D, TAB 1000IU (100/BT) 1 UNIT: 25 TAB at 08:46

## 2018-04-18 RX ADMIN — Medication 1 CAP: at 00:03

## 2018-04-18 RX ADMIN — INSULIN ASPART 1 UNIT: 100 INJECTION, SOLUTION INTRAVENOUS; SUBCUTANEOUS at 08:00

## 2018-04-18 RX ADMIN — HYDROCODONE BITARTRATE AND ACETAMINOPHEN 1 TAB: 5; 325 TABLET ORAL at 23:26

## 2018-04-18 RX ADMIN — CLINDAMYCIN IN 5 PERCENT DEXTROSE 1 MLS/HR: 12 INJECTION, SOLUTION INTRAVENOUS at 00:03

## 2018-04-18 RX ADMIN — HYDROCODONE BITARTRATE AND ACETAMINOPHEN 1 TAB: 5; 325 TABLET ORAL at 17:15

## 2018-04-18 RX ADMIN — Medication 1 CAP: at 23:29

## 2018-04-18 RX ADMIN — PREGABALIN 1 MG: 75 CAPSULE ORAL at 00:04

## 2018-04-18 RX ADMIN — INSULIN ASPART 1 UNIT: 100 INJECTION, SOLUTION INTRAVENOUS; SUBCUTANEOUS at 21:00

## 2018-04-18 RX ADMIN — RIFAMPIN 1 MG: 300 CAPSULE ORAL at 08:46

## 2018-04-18 RX ADMIN — INSULIN ASPART 1 UNIT: 100 INJECTION, SOLUTION INTRAVENOUS; SUBCUTANEOUS at 00:06

## 2018-04-18 RX ADMIN — INSULIN ASPART 1 UNIT: 100 INJECTION, SOLUTION INTRAVENOUS; SUBCUTANEOUS at 17:51

## 2018-04-18 RX ADMIN — HYDROCODONE BITARTRATE AND ACETAMINOPHEN 1 TAB: 5; 325 TABLET ORAL at 12:30

## 2018-04-18 RX ADMIN — CLINDAMYCIN IN 5 PERCENT DEXTROSE 1 MLS/HR: 12 INJECTION, SOLUTION INTRAVENOUS at 23:29

## 2018-04-18 RX ADMIN — CLINDAMYCIN IN 5 PERCENT DEXTROSE 1 MLS/HR: 12 INJECTION, SOLUTION INTRAVENOUS at 13:45

## 2018-04-18 RX ADMIN — PREGABALIN 1 MG: 75 CAPSULE ORAL at 08:46

## 2018-04-18 RX ADMIN — VITAMIN A AND D 1 APPLIC: 30.8 OINTMENT TOPICAL at 08:47

## 2018-04-18 RX ADMIN — EPOETIN ALFA 1 UNITS: 3000 SOLUTION INTRAVENOUS; SUBCUTANEOUS at 17:14

## 2018-04-18 RX ADMIN — HYDROCODONE BITARTRATE AND ACETAMINOPHEN 1 TAB: 5; 325 TABLET ORAL at 05:35

## 2018-04-18 RX ADMIN — PREGABALIN 1 MG: 75 CAPSULE ORAL at 23:29

## 2018-04-18 RX ADMIN — PANTOPRAZOLE SODIUM 1 MG: 40 TABLET, DELAYED RELEASE ORAL at 05:31

## 2018-04-18 RX ADMIN — CASTOR OIL AND BALSAM, PERU 1 APPLIC: 788; 87 OINTMENT TOPICAL at 09:00

## 2018-04-18 RX ADMIN — Medication 1 CAP: at 12:20

## 2018-04-18 RX ADMIN — ACYCLOVIR 1 MG: 400 TABLET ORAL at 17:00

## 2018-04-18 RX ADMIN — Medication 1 CAP: at 08:46

## 2018-04-19 LAB
ALBUMIN: 2.5 G/DL (ref 3.3–4.9)
ANION GAP: 15 (ref 8–16)
BLOOD UREA NITROGEN: 53 MG/DL (ref 7–20)
BODY FLD TYPE: (no result)
CALCIUM: 8.4 MG/DL (ref 8.4–10.2)
CARBON DIOXIDE: 31 MMOL/L (ref 21–31)
CHLORIDE: 96 MMOL/L (ref 97–110)
CREATININE: 3.22 MG/DL (ref 0.44–1)
FLD CLARITY: (no result)
FLD COLOR: YELLOW
FLD MN%: 68.6 %
FLD PMN%: 31.4 %
FLD RBC: 3000 /UL
FLD TYPE: (no result)
FLD VOLUME: 600 ML
FLD WBC: 35 /CMM
FLUID GLUCOSE: 130 MG/DL
FLUID LD: 153 U/L
FLUID TOTAL PROTEIN: 2.2 G/DL
FLUID TYPE: (no result)
GLUCOSE: 134 MG/DL (ref 70–220)
HEPATITIS B SURFACE ANTIBODY: POSITIVE
LACTATE DEHYDROGENASE: 280 IU/L (ref 313–618)
MAGNESIUM: 1.8 MG/DL (ref 1.7–2.5)
PHOSPHORUS: 4.4 MG/DL (ref 2.5–4.9)
POTASSIUM: 4.1 MMOL/L (ref 3.5–5.1)
RHEUMATOID FACTOR: NEGATIVE
SODIUM: 138 MMOL/L (ref 135–144)
TOTAL PROTEIN: 5.5 G/DL (ref 6.1–8.1)

## 2018-04-19 RX ADMIN — CASTOR OIL AND BALSAM, PERU 1 APPLIC: 788; 87 OINTMENT TOPICAL at 09:49

## 2018-04-19 RX ADMIN — CLINDAMYCIN IN 5 PERCENT DEXTROSE 1 MLS/HR: 12 INJECTION, SOLUTION INTRAVENOUS at 05:22

## 2018-04-19 RX ADMIN — CASTOR OIL AND BALSAM, PERU 1 APPLIC: 788; 87 OINTMENT TOPICAL at 22:55

## 2018-04-19 RX ADMIN — HYDROCODONE BITARTRATE AND ACETAMINOPHEN 1 TAB: 5; 325 TABLET ORAL at 04:17

## 2018-04-19 RX ADMIN — ACYCLOVIR 1 MG: 400 TABLET ORAL at 09:47

## 2018-04-19 RX ADMIN — HYDROCODONE BITARTRATE AND ACETAMINOPHEN 1 TAB: 5; 325 TABLET ORAL at 18:42

## 2018-04-19 RX ADMIN — INSULIN ASPART 1 UNIT: 100 INJECTION, SOLUTION INTRAVENOUS; SUBCUTANEOUS at 17:13

## 2018-04-19 RX ADMIN — CLINDAMYCIN IN 5 PERCENT DEXTROSE 1 MLS/HR: 12 INJECTION, SOLUTION INTRAVENOUS at 13:05

## 2018-04-19 RX ADMIN — ACYCLOVIR 1 MG: 400 TABLET ORAL at 20:39

## 2018-04-19 RX ADMIN — HYDROCODONE BITARTRATE AND ACETAMINOPHEN 1 TAB: 5; 325 TABLET ORAL at 22:46

## 2018-04-19 RX ADMIN — VITAMIN D, TAB 1000IU (100/BT) 1 UNIT: 25 TAB at 09:48

## 2018-04-19 RX ADMIN — LIDOCAINE HYDROCHLORIDE 1 ML: 10 INJECTION, SOLUTION INFILTRATION; PERINEURAL at 09:13

## 2018-04-19 RX ADMIN — Medication 1 CAP: at 09:48

## 2018-04-19 RX ADMIN — INSULIN ASPART 1 UNIT: 100 INJECTION, SOLUTION INTRAVENOUS; SUBCUTANEOUS at 07:57

## 2018-04-19 RX ADMIN — PREGABALIN 1 MG: 75 CAPSULE ORAL at 09:48

## 2018-04-19 RX ADMIN — HYDROCODONE BITARTRATE AND ACETAMINOPHEN 1 TAB: 5; 325 TABLET ORAL at 09:51

## 2018-04-19 RX ADMIN — INSULIN ASPART 1 UNIT: 100 INJECTION, SOLUTION INTRAVENOUS; SUBCUTANEOUS at 20:39

## 2018-04-19 RX ADMIN — ACYCLOVIR 1 MG: 400 TABLET ORAL at 01:38

## 2018-04-19 RX ADMIN — VITAMIN A AND D 1 APPLIC: 30.8 OINTMENT TOPICAL at 09:48

## 2018-04-19 RX ADMIN — PREGABALIN 1 MG: 75 CAPSULE ORAL at 20:39

## 2018-04-19 RX ADMIN — INSULIN ASPART 1 UNIT: 100 INJECTION, SOLUTION INTRAVENOUS; SUBCUTANEOUS at 11:32

## 2018-04-19 RX ADMIN — RIFAMPIN 1 MG: 300 CAPSULE ORAL at 09:48

## 2018-04-19 RX ADMIN — PANTOPRAZOLE SODIUM 1 MG: 40 TABLET, DELAYED RELEASE ORAL at 05:22

## 2018-04-19 RX ADMIN — CASTOR OIL AND BALSAM, PERU 1 APPLIC: 788; 87 OINTMENT TOPICAL at 20:39

## 2018-04-19 RX ADMIN — EPOETIN ALFA 1 UNITS: 3000 SOLUTION INTRAVENOUS; SUBCUTANEOUS at 22:40

## 2018-04-19 RX ADMIN — Medication 1 CAP: at 20:39

## 2018-04-19 RX ADMIN — Medication 1 CAP: at 13:05

## 2018-04-20 LAB
ABNORMAL IP MESSAGE: 1
ADD MAN DIFF?: NO
ALBUMIN: 2.4 G/DL (ref 3.3–4.9)
ANION GAP: 13 (ref 8–16)
BASOPHIL #: 0 10^3/UL (ref 0–0.1)
BASOPHILS %: 0.4 % (ref 0–2)
BLOOD UREA NITROGEN: 31 MG/DL (ref 7–20)
CALCIUM: 8.4 MG/DL (ref 8.4–10.2)
CARBON DIOXIDE: 32 MMOL/L (ref 21–31)
CHLORIDE: 98 MMOL/L (ref 97–110)
CREATININE: 2.44 MG/DL (ref 0.44–1)
EOSINOPHILS #: 0.1 10^3/UL (ref 0–0.5)
EOSINOPHILS %: 2.4 % (ref 0–7)
GLUCOSE: 87 MG/DL (ref 70–220)
HEMATOCRIT: 22.7 % (ref 37–47)
HEMOGLOBIN: 7.6 G/DL (ref 12–16)
LYMPHOCYTES #: 0.5 10^3/UL (ref 0.8–2.9)
LYMPHOCYTES %: 10.7 % (ref 15–51)
MAGNESIUM: 1.8 MG/DL (ref 1.7–2.5)
MEAN CORPUSCULAR HEMOGLOBIN: 32.8 PG (ref 29–33)
MEAN CORPUSCULAR HGB CONC: 33.5 G/DL (ref 32–37)
MEAN CORPUSCULAR VOLUME: 97.8 FL (ref 82–101)
MEAN PLATELET VOLUME: 11.5 FL (ref 7.4–10.4)
MONOCYTE #: 0.1 10^3/UL (ref 0.3–0.9)
MONOCYTES %: 2.8 % (ref 0–11)
NEUTROPHIL #: 3.9 10^3/UL (ref 1.6–7.5)
NEUTROPHILS %: 82.8 % (ref 39–77)
NUCLEATED RED BLOOD CELLS #: 0 10^3/UL (ref 0–0)
NUCLEATED RED BLOOD CELLS%: 0 /100WBC (ref 0–0)
PHOSPHORUS: 3.5 MG/DL (ref 2.5–4.9)
PLATELET COUNT: 84 10^3/UL (ref 140–415)
POSITIVE DIFF: (no result)
POTASSIUM: 3.9 MMOL/L (ref 3.5–5.1)
RED BLOOD COUNT: 2.32 10^6/UL (ref 4.2–5.4)
RED CELL DISTRIBUTION WIDTH: 16.1 % (ref 11.5–14.5)
SODIUM: 139 MMOL/L (ref 135–144)
URIC ACID: 3.3 MG/DL (ref 3.1–7.9)
WHITE BLOOD COUNT: 4.7 10^3/UL (ref 4.8–10.8)

## 2018-04-20 RX ADMIN — VITAMIN A AND D 1 APPLIC: 30.8 OINTMENT TOPICAL at 09:15

## 2018-04-20 RX ADMIN — RIFAMPIN 1 MG: 300 CAPSULE ORAL at 09:15

## 2018-04-20 RX ADMIN — Medication 1 CAP: at 09:15

## 2018-04-20 RX ADMIN — Medication 1 CAP: at 14:11

## 2018-04-20 RX ADMIN — ACYCLOVIR 1 MG: 400 TABLET ORAL at 09:15

## 2018-04-20 RX ADMIN — ANALGESIC BALM 1 APPLIC: 1.74; 4.06 OINTMENT TOPICAL at 17:17

## 2018-04-20 RX ADMIN — ANALGESIC BALM 1 APPLIC: 1.74; 4.06 OINTMENT TOPICAL at 14:11

## 2018-04-20 RX ADMIN — EPOETIN ALFA 1 UNITS: 3000 SOLUTION INTRAVENOUS; SUBCUTANEOUS at 17:00

## 2018-04-20 RX ADMIN — CLINDAMYCIN IN 5 PERCENT DEXTROSE 1 MLS/HR: 12 INJECTION, SOLUTION INTRAVENOUS at 22:16

## 2018-04-20 RX ADMIN — ACYCLOVIR 1 MG: 400 TABLET ORAL at 22:13

## 2018-04-20 RX ADMIN — VITAMIN D, TAB 1000IU (100/BT) 1 UNIT: 25 TAB at 09:15

## 2018-04-20 RX ADMIN — HYDROMORPHONE HYDROCHLORIDE 1 MG: 1 INJECTION, SOLUTION INTRAMUSCULAR; INTRAVENOUS; SUBCUTANEOUS at 14:12

## 2018-04-20 RX ADMIN — HYDROCODONE BITARTRATE AND ACETAMINOPHEN 1 TAB: 5; 325 TABLET ORAL at 10:12

## 2018-04-20 RX ADMIN — PANTOPRAZOLE SODIUM 1 MG: 40 TABLET, DELAYED RELEASE ORAL at 05:39

## 2018-04-20 RX ADMIN — CLINDAMYCIN IN 5 PERCENT DEXTROSE 1 MLS/HR: 12 INJECTION, SOLUTION INTRAVENOUS at 14:12

## 2018-04-20 RX ADMIN — PREGABALIN 1 MG: 75 CAPSULE ORAL at 09:15

## 2018-04-20 RX ADMIN — INSULIN ASPART 1 UNIT: 100 INJECTION, SOLUTION INTRAVENOUS; SUBCUTANEOUS at 12:00

## 2018-04-20 RX ADMIN — Medication 1 CAP: at 22:17

## 2018-04-20 RX ADMIN — METHYLPREDNISOLONE SODIUM SUCCINATE 1 MG: 40 INJECTION, POWDER, FOR SOLUTION INTRAMUSCULAR; INTRAVENOUS at 14:10

## 2018-04-20 RX ADMIN — INSULIN ASPART 1 UNIT: 100 INJECTION, SOLUTION INTRAVENOUS; SUBCUTANEOUS at 17:26

## 2018-04-20 RX ADMIN — CASTOR OIL AND BALSAM, PERU 1 APPLIC: 788; 87 OINTMENT TOPICAL at 22:17

## 2018-04-20 RX ADMIN — INSULIN ASPART 1 UNIT: 100 INJECTION, SOLUTION INTRAVENOUS; SUBCUTANEOUS at 07:58

## 2018-04-20 RX ADMIN — CLINDAMYCIN IN 5 PERCENT DEXTROSE 1 MLS/HR: 12 INJECTION, SOLUTION INTRAVENOUS at 05:39

## 2018-04-20 RX ADMIN — PREGABALIN 1 MG: 75 CAPSULE ORAL at 22:14

## 2018-04-20 RX ADMIN — CASTOR OIL AND BALSAM, PERU 1 APPLIC: 788; 87 OINTMENT TOPICAL at 09:16

## 2018-04-20 RX ADMIN — HYDROCODONE BITARTRATE AND ACETAMINOPHEN 1 TAB: 5; 325 TABLET ORAL at 05:53

## 2018-04-20 RX ADMIN — INSULIN ASPART 1 UNIT: 100 INJECTION, SOLUTION INTRAVENOUS; SUBCUTANEOUS at 22:16

## 2018-04-20 RX ADMIN — HYDROMORPHONE HYDROCHLORIDE 1 MG: 1 INJECTION, SOLUTION INTRAMUSCULAR; INTRAVENOUS; SUBCUTANEOUS at 22:25

## 2018-04-21 LAB
ADD MAN DIFF?: NO
BASOPHIL #: 0 10^3/UL (ref 0–0.1)
BASOPHILS %: 0.4 % (ref 0–2)
EOSINOPHILS #: 0.1 10^3/UL (ref 0–0.5)
EOSINOPHILS %: 2.2 % (ref 0–7)
HEMATOCRIT: 20 % (ref 37–47)
HEMOGLOBIN: 6.7 G/DL (ref 12–16)
IMMEDIATE SPIN CROSSMATCH: 1 2
LYMPHOCYTES #: 0.5 10^3/UL (ref 0.8–2.9)
LYMPHOCYTES %: 10.7 % (ref 15–51)
MEAN CORPUSCULAR HEMOGLOBIN: 32.7 PG (ref 29–33)
MEAN CORPUSCULAR HGB CONC: 33.5 G/DL (ref 32–37)
MEAN CORPUSCULAR VOLUME: 97.6 FL (ref 82–101)
MEAN PLATELET VOLUME: 11.8 FL (ref 7.4–10.4)
MONOCYTE #: 0.2 10^3/UL (ref 0.3–0.9)
MONOCYTES %: 3.4 % (ref 0–11)
NEUTROPHIL #: 4.2 10^3/UL (ref 1.6–7.5)
NEUTROPHILS %: 82.3 % (ref 39–77)
NUCLEATED RED BLOOD CELLS #: 0 10^3/UL (ref 0–0)
NUCLEATED RED BLOOD CELLS%: 0 /100WBC (ref 0–0)
PLATELET COUNT: 81 10^3/UL (ref 140–415)
RED BLOOD COUNT: 2.05 10^6/UL (ref 4.2–5.4)
RED CELL DISTRIBUTION WIDTH: 15.8 % (ref 11.5–14.5)
WHITE BLOOD COUNT: 5.1 10^3/UL (ref 4.8–10.8)

## 2018-04-21 RX ADMIN — HYDROMORPHONE HYDROCHLORIDE 1 MG: 1 INJECTION, SOLUTION INTRAMUSCULAR; INTRAVENOUS; SUBCUTANEOUS at 10:43

## 2018-04-21 RX ADMIN — INSULIN ASPART 1 UNIT: 100 INJECTION, SOLUTION INTRAVENOUS; SUBCUTANEOUS at 08:00

## 2018-04-21 RX ADMIN — PREGABALIN 1 MG: 75 CAPSULE ORAL at 20:26

## 2018-04-21 RX ADMIN — HYDROCODONE BITARTRATE AND ACETAMINOPHEN 1 TAB: 5; 325 TABLET ORAL at 20:26

## 2018-04-21 RX ADMIN — METHYLPREDNISOLONE SODIUM SUCCINATE 1 MG: 40 INJECTION, POWDER, FOR SOLUTION INTRAMUSCULAR; INTRAVENOUS at 20:26

## 2018-04-21 RX ADMIN — Medication 1 CAP: at 09:51

## 2018-04-21 RX ADMIN — ACYCLOVIR 1 MG: 400 TABLET ORAL at 09:52

## 2018-04-21 RX ADMIN — ANALGESIC BALM 1 APPLIC: 1.74; 4.06 OINTMENT TOPICAL at 12:00

## 2018-04-21 RX ADMIN — INSULIN ASPART 1 UNIT: 100 INJECTION, SOLUTION INTRAVENOUS; SUBCUTANEOUS at 12:00

## 2018-04-21 RX ADMIN — METHYLPREDNISOLONE SODIUM SUCCINATE 1 MG: 40 INJECTION, POWDER, FOR SOLUTION INTRAMUSCULAR; INTRAVENOUS at 09:00

## 2018-04-21 RX ADMIN — INSULIN ASPART 1 UNIT: 100 INJECTION, SOLUTION INTRAVENOUS; SUBCUTANEOUS at 17:20

## 2018-04-21 RX ADMIN — VITAMIN A AND D 1 APPLIC: 30.8 OINTMENT TOPICAL at 09:52

## 2018-04-21 RX ADMIN — INSULIN ASPART 1 UNIT: 100 INJECTION, SOLUTION INTRAVENOUS; SUBCUTANEOUS at 20:27

## 2018-04-21 RX ADMIN — CLINDAMYCIN IN 5 PERCENT DEXTROSE 1 MLS/HR: 12 INJECTION, SOLUTION INTRAVENOUS at 22:09

## 2018-04-21 RX ADMIN — ACYCLOVIR 1 MG: 400 TABLET ORAL at 20:26

## 2018-04-21 RX ADMIN — PANTOPRAZOLE SODIUM 1 MG: 40 TABLET, DELAYED RELEASE ORAL at 06:10

## 2018-04-21 RX ADMIN — ONDANSETRON HYDROCHLORIDE 1 MG: 2 INJECTION, SOLUTION INTRAMUSCULAR; INTRAVENOUS at 09:06

## 2018-04-21 RX ADMIN — RIFAMPIN 1 MG: 300 CAPSULE ORAL at 09:51

## 2018-04-21 RX ADMIN — CASTOR OIL AND BALSAM, PERU 1 APPLIC: 788; 87 OINTMENT TOPICAL at 09:52

## 2018-04-21 RX ADMIN — Medication 1 CAP: at 20:26

## 2018-04-21 RX ADMIN — PREGABALIN 1 MG: 75 CAPSULE ORAL at 09:52

## 2018-04-21 RX ADMIN — ANALGESIC BALM 1 APPLIC: 1.74; 4.06 OINTMENT TOPICAL at 06:11

## 2018-04-21 RX ADMIN — CASTOR OIL AND BALSAM, PERU 1 APPLIC: 788; 87 OINTMENT TOPICAL at 21:00

## 2018-04-21 RX ADMIN — CLINDAMYCIN IN 5 PERCENT DEXTROSE 1 MLS/HR: 12 INJECTION, SOLUTION INTRAVENOUS at 06:10

## 2018-04-21 RX ADMIN — CLINDAMYCIN IN 5 PERCENT DEXTROSE 1 MLS/HR: 12 INJECTION, SOLUTION INTRAVENOUS at 14:22

## 2018-04-21 RX ADMIN — Medication 1 CAP: at 12:36

## 2018-04-21 RX ADMIN — ANALGESIC BALM 1 APPLIC: 1.74; 4.06 OINTMENT TOPICAL at 17:20

## 2018-04-21 RX ADMIN — VITAMIN D, TAB 1000IU (100/BT) 1 UNIT: 25 TAB at 09:52

## 2018-04-21 RX ADMIN — ANALGESIC BALM 1 APPLIC: 1.74; 4.06 OINTMENT TOPICAL at 01:00

## 2018-04-21 RX ADMIN — HYDROMORPHONE HYDROCHLORIDE 1 MG: 1 INJECTION, SOLUTION INTRAMUSCULAR; INTRAVENOUS; SUBCUTANEOUS at 17:48

## 2018-04-22 LAB
ABNORMAL IP MESSAGE: 1
ADD MAN DIFF?: NO
ALBUMIN: 2.7 G/DL (ref 3.3–4.9)
ANION GAP: 19 (ref 8–16)
BASOPHIL #: 0 10^3/UL (ref 0–0.1)
BASOPHILS %: 0.6 % (ref 0–2)
BLOOD UREA NITROGEN: 23 MG/DL (ref 7–20)
CALCIUM: 8.6 MG/DL (ref 8.4–10.2)
CARBON DIOXIDE: 26 MMOL/L (ref 21–31)
CHLORIDE: 101 MMOL/L (ref 97–110)
CREATININE: 2.47 MG/DL (ref 0.44–1)
EOSINOPHILS #: 0.1 10^3/UL (ref 0–0.5)
EOSINOPHILS %: 1.1 % (ref 0–7)
GLUCOSE: 80 MG/DL (ref 70–220)
HEMATOCRIT: 28.5 % (ref 37–47)
HEMOGLOBIN: 9.4 G/DL (ref 12–16)
LYMPHOCYTES #: 0.5 10^3/UL (ref 0.8–2.9)
LYMPHOCYTES %: 8.8 % (ref 15–51)
MAGNESIUM: 1.8 MG/DL (ref 1.7–2.5)
MEAN CORPUSCULAR HEMOGLOBIN: 30.9 PG (ref 29–33)
MEAN CORPUSCULAR HGB CONC: 33 G/DL (ref 32–37)
MEAN CORPUSCULAR VOLUME: 93.8 FL (ref 82–101)
MEAN PLATELET VOLUME: 11.5 FL (ref 7.4–10.4)
MONOCYTE #: 0.1 10^3/UL (ref 0.3–0.9)
MONOCYTES %: 2.2 % (ref 0–11)
NEUTROPHIL #: 4.7 10^3/UL (ref 1.6–7.5)
NEUTROPHILS %: 86.2 % (ref 39–77)
NUCLEATED RED BLOOD CELLS #: 0 10^3/UL (ref 0–0)
NUCLEATED RED BLOOD CELLS%: 0 /100WBC (ref 0–0)
PHOSPHORUS: 4 MG/DL (ref 2.5–4.9)
PLATELET COUNT: 76 10^3/UL (ref 140–415)
POSITIVE DIFF: (no result)
POTASSIUM: 4.7 MMOL/L (ref 3.5–5.1)
RED BLOOD COUNT: 3.04 10^6/UL (ref 4.2–5.4)
RED CELL DISTRIBUTION WIDTH: 16.8 % (ref 11.5–14.5)
SODIUM: 141 MMOL/L (ref 135–144)
WHITE BLOOD COUNT: 5.5 10^3/UL (ref 4.8–10.8)

## 2018-04-22 RX ADMIN — INSULIN ASPART 1 UNIT: 100 INJECTION, SOLUTION INTRAVENOUS; SUBCUTANEOUS at 17:32

## 2018-04-22 RX ADMIN — VITAMIN D, TAB 1000IU (100/BT) 1 UNIT: 25 TAB at 09:00

## 2018-04-22 RX ADMIN — Medication 1 CAP: at 14:40

## 2018-04-22 RX ADMIN — Medication 1 CAP: at 20:58

## 2018-04-22 RX ADMIN — PREGABALIN 1 MG: 75 CAPSULE ORAL at 20:58

## 2018-04-22 RX ADMIN — ANALGESIC BALM 1 APPLIC: 1.74; 4.06 OINTMENT TOPICAL at 00:41

## 2018-04-22 RX ADMIN — ANALGESIC BALM 1 APPLIC: 1.74; 4.06 OINTMENT TOPICAL at 12:12

## 2018-04-22 RX ADMIN — ACYCLOVIR 1 MG: 400 TABLET ORAL at 09:00

## 2018-04-22 RX ADMIN — CLINDAMYCIN IN 5 PERCENT DEXTROSE 1 MLS/HR: 12 INJECTION, SOLUTION INTRAVENOUS at 14:40

## 2018-04-22 RX ADMIN — HYDROCODONE BITARTRATE AND ACETAMINOPHEN 1 TAB: 5; 325 TABLET ORAL at 05:44

## 2018-04-22 RX ADMIN — HYDRALAZINE HYDROCHLORIDE 1 MG: 20 INJECTION INTRAMUSCULAR; INTRAVENOUS at 09:17

## 2018-04-22 RX ADMIN — CLINDAMYCIN IN 5 PERCENT DEXTROSE 1 MLS/HR: 12 INJECTION, SOLUTION INTRAVENOUS at 05:45

## 2018-04-22 RX ADMIN — RIFAMPIN 1 MG: 300 CAPSULE ORAL at 09:01

## 2018-04-22 RX ADMIN — HYDROMORPHONE HYDROCHLORIDE 1 MG: 1 INJECTION, SOLUTION INTRAMUSCULAR; INTRAVENOUS; SUBCUTANEOUS at 10:20

## 2018-04-22 RX ADMIN — CASTOR OIL AND BALSAM, PERU 1 APPLIC: 788; 87 OINTMENT TOPICAL at 06:12

## 2018-04-22 RX ADMIN — VITAMIN A AND D 1 APPLIC: 30.8 OINTMENT TOPICAL at 09:00

## 2018-04-22 RX ADMIN — CASTOR OIL AND BALSAM, PERU 1 APPLIC: 788; 87 OINTMENT TOPICAL at 21:04

## 2018-04-22 RX ADMIN — INSULIN ASPART 1 UNIT: 100 INJECTION, SOLUTION INTRAVENOUS; SUBCUTANEOUS at 12:00

## 2018-04-22 RX ADMIN — PREGABALIN 1 MG: 75 CAPSULE ORAL at 09:00

## 2018-04-22 RX ADMIN — ACYCLOVIR 1 MG: 400 TABLET ORAL at 20:58

## 2018-04-22 RX ADMIN — HYDROMORPHONE HYDROCHLORIDE 1 MG: 1 INJECTION, SOLUTION INTRAMUSCULAR; INTRAVENOUS; SUBCUTANEOUS at 21:12

## 2018-04-22 RX ADMIN — HYDROMORPHONE HYDROCHLORIDE 1 MG: 1 INJECTION, SOLUTION INTRAMUSCULAR; INTRAVENOUS; SUBCUTANEOUS at 14:26

## 2018-04-22 RX ADMIN — ANALGESIC BALM 1 APPLIC: 1.74; 4.06 OINTMENT TOPICAL at 17:32

## 2018-04-22 RX ADMIN — PANTOPRAZOLE SODIUM 1 MG: 40 TABLET, DELAYED RELEASE ORAL at 05:53

## 2018-04-22 RX ADMIN — INSULIN ASPART 1 UNIT: 100 INJECTION, SOLUTION INTRAVENOUS; SUBCUTANEOUS at 21:00

## 2018-04-22 RX ADMIN — METHYLPREDNISOLONE SODIUM SUCCINATE 1 MG: 40 INJECTION, POWDER, FOR SOLUTION INTRAMUSCULAR; INTRAVENOUS at 09:00

## 2018-04-22 RX ADMIN — CLINDAMYCIN IN 5 PERCENT DEXTROSE 1 MLS/HR: 12 INJECTION, SOLUTION INTRAVENOUS at 21:58

## 2018-04-22 RX ADMIN — Medication 1 CAP: at 09:00

## 2018-04-22 RX ADMIN — ANALGESIC BALM 1 APPLIC: 1.74; 4.06 OINTMENT TOPICAL at 05:54

## 2018-04-22 RX ADMIN — CASTOR OIL AND BALSAM, PERU 1 APPLIC: 788; 87 OINTMENT TOPICAL at 09:01

## 2018-04-22 RX ADMIN — INSULIN ASPART 1 UNIT: 100 INJECTION, SOLUTION INTRAVENOUS; SUBCUTANEOUS at 08:00

## 2018-04-23 LAB
ALBUMIN: 2.6 G/DL (ref 3.3–4.9)
ANION GAP: 23 (ref 8–16)
BLOOD UREA NITROGEN: 31 MG/DL (ref 7–20)
CALCIUM: 8.5 MG/DL (ref 8.4–10.2)
CARBON DIOXIDE: 24 MMOL/L (ref 21–31)
CHLORIDE: 99 MMOL/L (ref 97–110)
CREATININE: 3.2 MG/DL (ref 0.44–1)
GLUCOSE: 113 MG/DL (ref 70–220)
MAGNESIUM: 1.7 MG/DL (ref 1.7–2.5)
PHOSPHORUS: 4 MG/DL (ref 2.5–4.9)
POTASSIUM: 3.9 MMOL/L (ref 3.5–5.1)
SODIUM: 142 MMOL/L (ref 135–144)

## 2018-04-23 RX ADMIN — ANALGESIC BALM 1 APPLIC: 1.74; 4.06 OINTMENT TOPICAL at 00:00

## 2018-04-23 RX ADMIN — CASTOR OIL AND BALSAM, PERU 1 APPLIC: 788; 87 OINTMENT TOPICAL at 08:55

## 2018-04-23 RX ADMIN — Medication 1 CAP: at 13:51

## 2018-04-23 RX ADMIN — RIFAMPIN 1 MG: 300 CAPSULE ORAL at 08:54

## 2018-04-23 RX ADMIN — Medication 1 CAP: at 08:54

## 2018-04-23 RX ADMIN — VITAMIN D, TAB 1000IU (100/BT) 1 UNIT: 25 TAB at 08:54

## 2018-04-23 RX ADMIN — Medication 1 CAP: at 22:09

## 2018-04-23 RX ADMIN — ACYCLOVIR 1 MG: 400 TABLET ORAL at 08:54

## 2018-04-23 RX ADMIN — CLINDAMYCIN IN 5 PERCENT DEXTROSE 1 MLS/HR: 12 INJECTION, SOLUTION INTRAVENOUS at 22:08

## 2018-04-23 RX ADMIN — INSULIN ASPART 1 UNIT: 100 INJECTION, SOLUTION INTRAVENOUS; SUBCUTANEOUS at 21:00

## 2018-04-23 RX ADMIN — CLINDAMYCIN IN 5 PERCENT DEXTROSE 1 MLS/HR: 12 INJECTION, SOLUTION INTRAVENOUS at 13:51

## 2018-04-23 RX ADMIN — HYDROMORPHONE HYDROCHLORIDE 1 MG: 1 INJECTION, SOLUTION INTRAMUSCULAR; INTRAVENOUS; SUBCUTANEOUS at 11:02

## 2018-04-23 RX ADMIN — ANALGESIC BALM 1 APPLIC: 1.74; 4.06 OINTMENT TOPICAL at 17:26

## 2018-04-23 RX ADMIN — PANTOPRAZOLE SODIUM 1 MG: 40 TABLET, DELAYED RELEASE ORAL at 05:36

## 2018-04-23 RX ADMIN — ACYCLOVIR 1 MG: 400 TABLET ORAL at 22:09

## 2018-04-23 RX ADMIN — PREGABALIN 1 MG: 75 CAPSULE ORAL at 08:54

## 2018-04-23 RX ADMIN — ANALGESIC BALM 1 APPLIC: 1.74; 4.06 OINTMENT TOPICAL at 05:37

## 2018-04-23 RX ADMIN — INSULIN ASPART 1 UNIT: 100 INJECTION, SOLUTION INTRAVENOUS; SUBCUTANEOUS at 07:39

## 2018-04-23 RX ADMIN — INSULIN ASPART 1 UNIT: 100 INJECTION, SOLUTION INTRAVENOUS; SUBCUTANEOUS at 11:53

## 2018-04-23 RX ADMIN — CASTOR OIL AND BALSAM, PERU 1 APPLIC: 788; 87 OINTMENT TOPICAL at 22:09

## 2018-04-23 RX ADMIN — ANALGESIC BALM 1 APPLIC: 1.74; 4.06 OINTMENT TOPICAL at 11:54

## 2018-04-23 RX ADMIN — METHYLPREDNISOLONE SODIUM SUCCINATE 1 MG: 125 INJECTION, POWDER, FOR SOLUTION INTRAMUSCULAR; INTRAVENOUS at 08:53

## 2018-04-23 RX ADMIN — HYDROMORPHONE HYDROCHLORIDE 1 MG: 1 INJECTION, SOLUTION INTRAMUSCULAR; INTRAVENOUS; SUBCUTANEOUS at 05:43

## 2018-04-23 RX ADMIN — EPOETIN ALFA 1 UNITS: 3000 SOLUTION INTRAVENOUS; SUBCUTANEOUS at 17:27

## 2018-04-23 RX ADMIN — VITAMIN A AND D 1 APPLIC: 30.8 OINTMENT TOPICAL at 08:54

## 2018-04-23 RX ADMIN — CLINDAMYCIN IN 5 PERCENT DEXTROSE 1 MLS/HR: 12 INJECTION, SOLUTION INTRAVENOUS at 05:36

## 2018-04-23 RX ADMIN — PREGABALIN 1 MG: 75 CAPSULE ORAL at 22:09

## 2018-04-23 RX ADMIN — INSULIN ASPART 1 UNIT: 100 INJECTION, SOLUTION INTRAVENOUS; SUBCUTANEOUS at 17:27

## 2018-04-24 LAB
ABNORMAL IP MESSAGE: 1
ADD MAN DIFF?: NO
ALBUMIN: 2.4 G/DL (ref 3.3–4.9)
ANION GAP: 13 (ref 8–16)
BASOPHIL #: 0 10^3/UL (ref 0–0.1)
BASOPHILS %: 0.4 % (ref 0–2)
BLOOD UREA NITROGEN: 38 MG/DL (ref 7–20)
CALCIUM: 8.6 MG/DL (ref 8.4–10.2)
CARBON DIOXIDE: 24 MMOL/L (ref 21–31)
CHLORIDE: 104 MMOL/L (ref 97–110)
CREATININE: 3.8 MG/DL (ref 0.44–1)
EOSINOPHILS #: 0.2 10^3/UL (ref 0–0.5)
EOSINOPHILS %: 4.4 % (ref 0–7)
GLUCOSE: 111 MG/DL (ref 70–220)
HEMATOCRIT: 26.8 % (ref 37–47)
HEMOGLOBIN: 8.8 G/DL (ref 12–16)
LYMPHOCYTES #: 0.5 10^3/UL (ref 0.8–2.9)
LYMPHOCYTES %: 11.6 % (ref 15–51)
MAGNESIUM: 1.9 MG/DL (ref 1.7–2.5)
MEAN CORPUSCULAR HEMOGLOBIN: 31.4 PG (ref 29–33)
MEAN CORPUSCULAR HGB CONC: 32.8 G/DL (ref 32–37)
MEAN CORPUSCULAR VOLUME: 95.7 FL (ref 82–101)
MEAN PLATELET VOLUME: 11.2 FL (ref 7.4–10.4)
MONOCYTE #: 0.2 10^3/UL (ref 0.3–0.9)
MONOCYTES %: 4.4 % (ref 0–11)
NEUTROPHIL #: 3.5 10^3/UL (ref 1.6–7.5)
NEUTROPHILS %: 78.3 % (ref 39–77)
NUCLEATED RED BLOOD CELLS #: 0 10^3/UL (ref 0–0)
NUCLEATED RED BLOOD CELLS%: 0 /100WBC (ref 0–0)
PHOSPHORUS: 4.6 MG/DL (ref 2.5–4.9)
PLATELET COUNT: 78 10^3/UL (ref 140–415)
POSITIVE DIFF: (no result)
POTASSIUM: 4.2 MMOL/L (ref 3.5–5.1)
RED BLOOD COUNT: 2.8 10^6/UL (ref 4.2–5.4)
RED CELL DISTRIBUTION WIDTH: 16.5 % (ref 11.5–14.5)
SODIUM: 137 MMOL/L (ref 135–144)
WHITE BLOOD COUNT: 4.5 10^3/UL (ref 4.8–10.8)

## 2018-04-24 RX ADMIN — CLINDAMYCIN IN 5 PERCENT DEXTROSE 1 MLS/HR: 12 INJECTION, SOLUTION INTRAVENOUS at 13:50

## 2018-04-24 RX ADMIN — ANALGESIC BALM 1 APPLIC: 1.74; 4.06 OINTMENT TOPICAL at 12:20

## 2018-04-24 RX ADMIN — Medication 1 CAP: at 21:06

## 2018-04-24 RX ADMIN — INSULIN ASPART 1 UNIT: 100 INJECTION, SOLUTION INTRAVENOUS; SUBCUTANEOUS at 17:58

## 2018-04-24 RX ADMIN — METHYLPREDNISOLONE SODIUM SUCCINATE 1 MG: 125 INJECTION, POWDER, FOR SOLUTION INTRAMUSCULAR; INTRAVENOUS at 09:18

## 2018-04-24 RX ADMIN — INSULIN ASPART 1 UNIT: 100 INJECTION, SOLUTION INTRAVENOUS; SUBCUTANEOUS at 21:00

## 2018-04-24 RX ADMIN — PREGABALIN 1 MG: 75 CAPSULE ORAL at 21:06

## 2018-04-24 RX ADMIN — PANTOPRAZOLE SODIUM 1 MG: 40 TABLET, DELAYED RELEASE ORAL at 06:02

## 2018-04-24 RX ADMIN — HYDROMORPHONE HYDROCHLORIDE 1 MG: 1 INJECTION, SOLUTION INTRAMUSCULAR; INTRAVENOUS; SUBCUTANEOUS at 05:53

## 2018-04-24 RX ADMIN — ANALGESIC BALM 1 APPLIC: 1.74; 4.06 OINTMENT TOPICAL at 06:00

## 2018-04-24 RX ADMIN — HYDROMORPHONE HYDROCHLORIDE 1 MG: 1 INJECTION, SOLUTION INTRAMUSCULAR; INTRAVENOUS; SUBCUTANEOUS at 22:35

## 2018-04-24 RX ADMIN — VITAMIN A AND D 1 APPLIC: 30.8 OINTMENT TOPICAL at 09:17

## 2018-04-24 RX ADMIN — ACYCLOVIR 1 MG: 400 TABLET ORAL at 21:06

## 2018-04-24 RX ADMIN — HYDROMORPHONE HYDROCHLORIDE 1 MG: 1 INJECTION, SOLUTION INTRAMUSCULAR; INTRAVENOUS; SUBCUTANEOUS at 16:17

## 2018-04-24 RX ADMIN — ANALGESIC BALM 1 APPLIC: 1.74; 4.06 OINTMENT TOPICAL at 12:00

## 2018-04-24 RX ADMIN — PREGABALIN 1 MG: 75 CAPSULE ORAL at 09:18

## 2018-04-24 RX ADMIN — CLINDAMYCIN IN 5 PERCENT DEXTROSE 1 MLS/HR: 12 INJECTION, SOLUTION INTRAVENOUS at 21:45

## 2018-04-24 RX ADMIN — HYDROMORPHONE HYDROCHLORIDE 1 MG: 1 INJECTION, SOLUTION INTRAMUSCULAR; INTRAVENOUS; SUBCUTANEOUS at 00:46

## 2018-04-24 RX ADMIN — CLINDAMYCIN IN 5 PERCENT DEXTROSE 1 MLS/HR: 12 INJECTION, SOLUTION INTRAVENOUS at 05:53

## 2018-04-24 RX ADMIN — HYDROMORPHONE HYDROCHLORIDE 1 MG: 1 INJECTION, SOLUTION INTRAMUSCULAR; INTRAVENOUS; SUBCUTANEOUS at 12:19

## 2018-04-24 RX ADMIN — VITAMIN D, TAB 1000IU (100/BT) 1 UNIT: 25 TAB at 09:18

## 2018-04-24 RX ADMIN — CASTOR OIL AND BALSAM, PERU 1 APPLIC: 788; 87 OINTMENT TOPICAL at 09:17

## 2018-04-24 RX ADMIN — CASTOR OIL AND BALSAM, PERU 1 APPLIC: 788; 87 OINTMENT TOPICAL at 21:06

## 2018-04-24 RX ADMIN — INSULIN ASPART 1 UNIT: 100 INJECTION, SOLUTION INTRAVENOUS; SUBCUTANEOUS at 08:00

## 2018-04-24 RX ADMIN — INSULIN ASPART 1 UNIT: 100 INJECTION, SOLUTION INTRAVENOUS; SUBCUTANEOUS at 12:00

## 2018-04-24 RX ADMIN — ONDANSETRON HYDROCHLORIDE 1 MG: 2 INJECTION, SOLUTION INTRAMUSCULAR; INTRAVENOUS at 02:47

## 2018-04-24 RX ADMIN — ACYCLOVIR 1 MG: 400 TABLET ORAL at 10:57

## 2018-04-24 RX ADMIN — HYDROMORPHONE HYDROCHLORIDE 1 MG: 1 INJECTION, SOLUTION INTRAMUSCULAR; INTRAVENOUS; SUBCUTANEOUS at 10:57

## 2018-04-24 RX ADMIN — RIFAMPIN 1 MG: 300 CAPSULE ORAL at 09:18

## 2018-04-24 RX ADMIN — Medication 1 CAP: at 09:18

## 2018-04-24 RX ADMIN — Medication 1 CAP: at 13:50

## 2018-04-24 RX ADMIN — ANALGESIC BALM 1 APPLIC: 1.74; 4.06 OINTMENT TOPICAL at 00:00

## 2018-04-24 RX ADMIN — ANALGESIC BALM 1 APPLIC: 1.74; 4.06 OINTMENT TOPICAL at 18:00

## 2018-04-25 LAB
ABNORMAL IP MESSAGE: 1
ADD MAN DIFF?: NO
ANION GAP: 12 (ref 8–16)
BASOPHIL #: 0 10^3/UL (ref 0–0.1)
BASOPHILS %: 0.2 % (ref 0–2)
BLOOD UREA NITROGEN: 22 MG/DL (ref 7–20)
CALCIUM: 8.4 MG/DL (ref 8.4–10.2)
CARBON DIOXIDE: 29 MMOL/L (ref 21–31)
CHLORIDE: 102 MMOL/L (ref 97–110)
CREATININE: 2.85 MG/DL (ref 0.44–1)
EOSINOPHILS #: 0.2 10^3/UL (ref 0–0.5)
EOSINOPHILS %: 4.4 % (ref 0–7)
GLUCOSE: 93 MG/DL (ref 70–220)
HEMATOCRIT: 25.8 % (ref 37–47)
HEMOGLOBIN: 8.7 G/DL (ref 12–16)
LYMPHOCYTES #: 0.5 10^3/UL (ref 0.8–2.9)
LYMPHOCYTES %: 11.7 % (ref 15–51)
MAGNESIUM: 1.9 MG/DL (ref 1.7–2.5)
MEAN CORPUSCULAR HEMOGLOBIN: 32.7 PG (ref 29–33)
MEAN CORPUSCULAR HGB CONC: 33.7 G/DL (ref 32–37)
MEAN CORPUSCULAR VOLUME: 97 FL (ref 82–101)
MEAN PLATELET VOLUME: 12 FL (ref 7.4–10.4)
MONOCYTE #: 0.2 10^3/UL (ref 0.3–0.9)
MONOCYTES %: 5.1 % (ref 0–11)
NEUTROPHIL #: 3.2 10^3/UL (ref 1.6–7.5)
NEUTROPHILS %: 77.6 % (ref 39–77)
NUCLEATED RED BLOOD CELLS #: 0 10^3/UL (ref 0–0)
NUCLEATED RED BLOOD CELLS%: 0 /100WBC (ref 0–0)
OCCULT BLOOD STOOL: NEGATIVE
PHOSPHORUS: 3.5 MG/DL (ref 2.5–4.9)
PLATELET COUNT: 109 10^3/UL (ref 140–415)
POSITIVE DIFF: (no result)
POTASSIUM: 4.4 MMOL/L (ref 3.5–5.1)
RED BLOOD COUNT: 2.66 10^6/UL (ref 4.2–5.4)
RED CELL DISTRIBUTION WIDTH: 17.2 % (ref 11.5–14.5)
SODIUM: 139 MMOL/L (ref 135–144)
WHITE BLOOD COUNT: 4.1 10^3/UL (ref 4.8–10.8)

## 2018-04-25 RX ADMIN — ANALGESIC BALM 1 APPLIC: 1.74; 4.06 OINTMENT TOPICAL at 12:00

## 2018-04-25 RX ADMIN — INSULIN ASPART 1 UNIT: 100 INJECTION, SOLUTION INTRAVENOUS; SUBCUTANEOUS at 12:00

## 2018-04-25 RX ADMIN — HYDROMORPHONE HYDROCHLORIDE 1 MG: 1 INJECTION, SOLUTION INTRAMUSCULAR; INTRAVENOUS; SUBCUTANEOUS at 22:56

## 2018-04-25 RX ADMIN — ACYCLOVIR 1 MG: 400 TABLET ORAL at 09:22

## 2018-04-25 RX ADMIN — Medication 1 CAP: at 09:22

## 2018-04-25 RX ADMIN — HYDROMORPHONE HYDROCHLORIDE 1 MG: 1 INJECTION, SOLUTION INTRAMUSCULAR; INTRAVENOUS; SUBCUTANEOUS at 16:51

## 2018-04-25 RX ADMIN — PREGABALIN 1 MG: 75 CAPSULE ORAL at 22:07

## 2018-04-25 RX ADMIN — CLINDAMYCIN IN 5 PERCENT DEXTROSE 1 MLS/HR: 12 INJECTION, SOLUTION INTRAVENOUS at 05:46

## 2018-04-25 RX ADMIN — ACYCLOVIR 1 MG: 400 TABLET ORAL at 22:07

## 2018-04-25 RX ADMIN — Medication 1 CAP: at 22:07

## 2018-04-25 RX ADMIN — INSULIN ASPART 1 UNIT: 100 INJECTION, SOLUTION INTRAVENOUS; SUBCUTANEOUS at 18:10

## 2018-04-25 RX ADMIN — INSULIN ASPART 1 UNIT: 100 INJECTION, SOLUTION INTRAVENOUS; SUBCUTANEOUS at 08:00

## 2018-04-25 RX ADMIN — ANALGESIC BALM 1 APPLIC: 1.74; 4.06 OINTMENT TOPICAL at 18:00

## 2018-04-25 RX ADMIN — ANALGESIC BALM 1 APPLIC: 1.74; 4.06 OINTMENT TOPICAL at 00:00

## 2018-04-25 RX ADMIN — RIFAMPIN 1 MG: 300 CAPSULE ORAL at 09:22

## 2018-04-25 RX ADMIN — HYDROMORPHONE HYDROCHLORIDE 1 MG: 1 INJECTION, SOLUTION INTRAMUSCULAR; INTRAVENOUS; SUBCUTANEOUS at 11:51

## 2018-04-25 RX ADMIN — CASTOR OIL AND BALSAM, PERU 1 APPLIC: 788; 87 OINTMENT TOPICAL at 09:34

## 2018-04-25 RX ADMIN — VITAMIN A AND D 1 APPLIC: 30.8 OINTMENT TOPICAL at 09:34

## 2018-04-25 RX ADMIN — PANTOPRAZOLE SODIUM 1 MG: 40 TABLET, DELAYED RELEASE ORAL at 05:46

## 2018-04-25 RX ADMIN — Medication 1 CAP: at 13:21

## 2018-04-25 RX ADMIN — ANALGESIC BALM 1 APPLIC: 1.74; 4.06 OINTMENT TOPICAL at 05:46

## 2018-04-25 RX ADMIN — CLINDAMYCIN IN 5 PERCENT DEXTROSE 1 MLS/HR: 12 INJECTION, SOLUTION INTRAVENOUS at 13:21

## 2018-04-25 RX ADMIN — CLINDAMYCIN IN 5 PERCENT DEXTROSE 1 MLS/HR: 12 INJECTION, SOLUTION INTRAVENOUS at 22:09

## 2018-04-25 RX ADMIN — INSULIN ASPART 1 UNIT: 100 INJECTION, SOLUTION INTRAVENOUS; SUBCUTANEOUS at 13:02

## 2018-04-25 RX ADMIN — HYDROMORPHONE HYDROCHLORIDE 1 MG: 1 INJECTION, SOLUTION INTRAMUSCULAR; INTRAVENOUS; SUBCUTANEOUS at 03:43

## 2018-04-25 RX ADMIN — CASTOR OIL AND BALSAM, PERU 1 APPLIC: 788; 87 OINTMENT TOPICAL at 22:08

## 2018-04-25 RX ADMIN — VITAMIN D, TAB 1000IU (100/BT) 1 UNIT: 25 TAB at 09:22

## 2018-04-25 RX ADMIN — EPOETIN ALFA 1 UNITS: 10000 SOLUTION INTRAVENOUS; SUBCUTANEOUS at 17:00

## 2018-04-25 RX ADMIN — PREGABALIN 1 MG: 75 CAPSULE ORAL at 09:22

## 2018-04-25 RX ADMIN — INSULIN ASPART 1 UNIT: 100 INJECTION, SOLUTION INTRAVENOUS; SUBCUTANEOUS at 21:00

## 2018-04-26 RX ADMIN — RIFAMPIN 1 MG: 300 CAPSULE ORAL at 08:25

## 2018-04-26 RX ADMIN — CLINDAMYCIN IN 5 PERCENT DEXTROSE 1 MLS/HR: 12 INJECTION, SOLUTION INTRAVENOUS at 05:50

## 2018-04-26 RX ADMIN — CASTOR OIL AND BALSAM, PERU 1 APPLIC: 788; 87 OINTMENT TOPICAL at 21:00

## 2018-04-26 RX ADMIN — ANALGESIC BALM 1 APPLIC: 1.74; 4.06 OINTMENT TOPICAL at 00:00

## 2018-04-26 RX ADMIN — PREGABALIN 1 MG: 75 CAPSULE ORAL at 08:26

## 2018-04-26 RX ADMIN — DICLOFENAC 1 GM: 10 GEL TOPICAL at 21:00

## 2018-04-26 RX ADMIN — Medication 1 CAP: at 14:02

## 2018-04-26 RX ADMIN — VITAMIN A AND D 1 APPLIC: 30.8 OINTMENT TOPICAL at 08:25

## 2018-04-26 RX ADMIN — INSULIN ASPART 1 UNIT: 100 INJECTION, SOLUTION INTRAVENOUS; SUBCUTANEOUS at 21:00

## 2018-04-26 RX ADMIN — DICLOFENAC 1 GM: 10 GEL TOPICAL at 18:06

## 2018-04-26 RX ADMIN — VITAMIN D, TAB 1000IU (100/BT) 1 UNIT: 25 TAB at 08:26

## 2018-04-26 RX ADMIN — HYDROMORPHONE HYDROCHLORIDE 1 MG: 1 INJECTION, SOLUTION INTRAMUSCULAR; INTRAVENOUS; SUBCUTANEOUS at 12:06

## 2018-04-26 RX ADMIN — CLINDAMYCIN IN 5 PERCENT DEXTROSE 1 MLS/HR: 12 INJECTION, SOLUTION INTRAVENOUS at 14:02

## 2018-04-26 RX ADMIN — Medication 1 CAP: at 22:40

## 2018-04-26 RX ADMIN — Medication 1 CAP: at 08:25

## 2018-04-26 RX ADMIN — ACYCLOVIR 1 MG: 400 TABLET ORAL at 08:25

## 2018-04-26 RX ADMIN — EPOETIN ALFA 1 UNITS: 10000 SOLUTION INTRAVENOUS; SUBCUTANEOUS at 22:41

## 2018-04-26 RX ADMIN — PREGABALIN 1 MG: 75 CAPSULE ORAL at 22:40

## 2018-04-26 RX ADMIN — PANTOPRAZOLE SODIUM 1 MG: 40 TABLET, DELAYED RELEASE ORAL at 05:50

## 2018-04-26 RX ADMIN — ANALGESIC BALM 1 APPLIC: 1.74; 4.06 OINTMENT TOPICAL at 11:55

## 2018-04-26 RX ADMIN — INSULIN ASPART 1 UNIT: 100 INJECTION, SOLUTION INTRAVENOUS; SUBCUTANEOUS at 11:55

## 2018-04-26 RX ADMIN — HYDROMORPHONE HYDROCHLORIDE 1 MG: 1 INJECTION, SOLUTION INTRAMUSCULAR; INTRAVENOUS; SUBCUTANEOUS at 18:08

## 2018-04-26 RX ADMIN — HYDROMORPHONE HYDROCHLORIDE 1 MG: 1 INJECTION, SOLUTION INTRAMUSCULAR; INTRAVENOUS; SUBCUTANEOUS at 06:01

## 2018-04-26 RX ADMIN — CLINDAMYCIN IN 5 PERCENT DEXTROSE 1 MLS/HR: 12 INJECTION, SOLUTION INTRAVENOUS at 22:40

## 2018-04-26 RX ADMIN — DICLOFENAC 1 GM: 10 GEL TOPICAL at 15:10

## 2018-04-26 RX ADMIN — ACYCLOVIR 1 MG: 400 TABLET ORAL at 22:40

## 2018-04-26 RX ADMIN — ANALGESIC BALM 1 APPLIC: 1.74; 4.06 OINTMENT TOPICAL at 18:08

## 2018-04-26 RX ADMIN — HYDROCODONE BITARTRATE AND ACETAMINOPHEN 1 TAB: 5; 325 TABLET ORAL at 21:46

## 2018-04-26 RX ADMIN — INSULIN ASPART 1 UNIT: 100 INJECTION, SOLUTION INTRAVENOUS; SUBCUTANEOUS at 07:49

## 2018-04-26 RX ADMIN — ANALGESIC BALM 1 APPLIC: 1.74; 4.06 OINTMENT TOPICAL at 05:50

## 2018-04-26 RX ADMIN — INSULIN ASPART 1 UNIT: 100 INJECTION, SOLUTION INTRAVENOUS; SUBCUTANEOUS at 18:08

## 2018-04-26 RX ADMIN — CASTOR OIL AND BALSAM, PERU 1 APPLIC: 788; 87 OINTMENT TOPICAL at 08:26

## 2018-04-26 RX ADMIN — HYDROMORPHONE HYDROCHLORIDE 1 MG: 1 INJECTION, SOLUTION INTRAMUSCULAR; INTRAVENOUS; SUBCUTANEOUS at 23:29

## 2018-04-27 LAB
ABNORMAL IP MESSAGE: 1
ADD MAN DIFF?: NO
ANION GAP: 9 (ref 8–16)
BASOPHIL #: 0 10^3/UL (ref 0–0.1)
BASOPHILS %: 0.8 % (ref 0–2)
BLOOD UREA NITROGEN: 18 MG/DL (ref 7–20)
CALCIUM: 8.4 MG/DL (ref 8.4–10.2)
CARBON DIOXIDE: 33 MMOL/L (ref 21–31)
CHLORIDE: 102 MMOL/L (ref 97–110)
CK INDEX: (no result)
CK-MB: 0.45 NG/ML (ref 0–2.4)
CREATINE KINASE: < 20 IU/L (ref 23–200)
CREATININE: 2.47 MG/DL (ref 0.44–1)
EOSINOPHILS #: 0.2 10^3/UL (ref 0–0.5)
EOSINOPHILS %: 3.9 % (ref 0–7)
GLUCOSE: 114 MG/DL (ref 70–220)
HEMATOCRIT: 26.4 % (ref 37–47)
HEMOGLOBIN: 8.7 G/DL (ref 12–16)
LYMPHOCYTES #: 0.6 10^3/UL (ref 0.8–2.9)
LYMPHOCYTES %: 15.2 % (ref 15–51)
MAGNESIUM: 1.8 MG/DL (ref 1.7–2.5)
MEAN CORPUSCULAR HEMOGLOBIN: 32.3 PG (ref 29–33)
MEAN CORPUSCULAR HGB CONC: 33 G/DL (ref 32–37)
MEAN CORPUSCULAR VOLUME: 98.1 FL (ref 82–101)
MEAN PLATELET VOLUME: 10.8 FL (ref 7.4–10.4)
MONOCYTE #: 0.3 10^3/UL (ref 0.3–0.9)
MONOCYTES %: 7.5 % (ref 0–11)
NEUTROPHIL #: 2.8 10^3/UL (ref 1.6–7.5)
NEUTROPHILS %: 72.1 % (ref 39–77)
NUCLEATED RED BLOOD CELLS #: 0 10^3/UL (ref 0–0)
NUCLEATED RED BLOOD CELLS%: 0 /100WBC (ref 0–0)
PHOSPHORUS: 3.1 MG/DL (ref 2.5–4.9)
PLATELET COUNT: 135 10^3/UL (ref 140–415)
POSITIVE DIFF: (no result)
POTASSIUM: 4.3 MMOL/L (ref 3.5–5.1)
RED BLOOD COUNT: 2.69 10^6/UL (ref 4.2–5.4)
RED CELL DISTRIBUTION WIDTH: 18.7 % (ref 11.5–14.5)
SODIUM: 140 MMOL/L (ref 135–144)
TROPONIN-I: 0.07 NG/ML (ref 0–0.12)
WHITE BLOOD COUNT: 3.9 10^3/UL (ref 4.8–10.8)

## 2018-04-27 RX ADMIN — INSULIN ASPART 1 UNIT: 100 INJECTION, SOLUTION INTRAVENOUS; SUBCUTANEOUS at 21:00

## 2018-04-27 RX ADMIN — DICLOFENAC 1 GM: 10 GEL TOPICAL at 09:41

## 2018-04-27 RX ADMIN — RIFAMPIN 1 MG: 300 CAPSULE ORAL at 09:39

## 2018-04-27 RX ADMIN — Medication 1 CAP: at 12:35

## 2018-04-27 RX ADMIN — Medication 1 CAP: at 20:59

## 2018-04-27 RX ADMIN — HYDROMORPHONE HYDROCHLORIDE 1 MG: 1 INJECTION, SOLUTION INTRAMUSCULAR; INTRAVENOUS; SUBCUTANEOUS at 12:35

## 2018-04-27 RX ADMIN — VITAMIN A AND D 1 APPLIC: 30.8 OINTMENT TOPICAL at 09:39

## 2018-04-27 RX ADMIN — CLINDAMYCIN IN 5 PERCENT DEXTROSE 1 MLS/HR: 12 INJECTION, SOLUTION INTRAVENOUS at 05:22

## 2018-04-27 RX ADMIN — CLINDAMYCIN IN 5 PERCENT DEXTROSE 1 MLS/HR: 12 INJECTION, SOLUTION INTRAVENOUS at 20:59

## 2018-04-27 RX ADMIN — ANALGESIC BALM 1 APPLIC: 1.74; 4.06 OINTMENT TOPICAL at 00:00

## 2018-04-27 RX ADMIN — INSULIN ASPART 1 UNIT: 100 INJECTION, SOLUTION INTRAVENOUS; SUBCUTANEOUS at 17:24

## 2018-04-27 RX ADMIN — ACYCLOVIR 1 MG: 400 TABLET ORAL at 09:40

## 2018-04-27 RX ADMIN — HYDROMORPHONE HYDROCHLORIDE 1 MG: 1 INJECTION, SOLUTION INTRAMUSCULAR; INTRAVENOUS; SUBCUTANEOUS at 16:53

## 2018-04-27 RX ADMIN — ANALGESIC BALM 1 APPLIC: 1.74; 4.06 OINTMENT TOPICAL at 16:59

## 2018-04-27 RX ADMIN — HYDROMORPHONE HYDROCHLORIDE 1 MG: 1 INJECTION, SOLUTION INTRAMUSCULAR; INTRAVENOUS; SUBCUTANEOUS at 20:59

## 2018-04-27 RX ADMIN — ANALGESIC BALM 1 APPLIC: 1.74; 4.06 OINTMENT TOPICAL at 12:00

## 2018-04-27 RX ADMIN — CLINDAMYCIN IN 5 PERCENT DEXTROSE 1 MLS/HR: 12 INJECTION, SOLUTION INTRAVENOUS at 14:02

## 2018-04-27 RX ADMIN — CASTOR OIL AND BALSAM, PERU 1 APPLIC: 788; 87 OINTMENT TOPICAL at 21:01

## 2018-04-27 RX ADMIN — INSULIN ASPART 1 UNIT: 100 INJECTION, SOLUTION INTRAVENOUS; SUBCUTANEOUS at 12:00

## 2018-04-27 RX ADMIN — VITAMIN D, TAB 1000IU (100/BT) 1 UNIT: 25 TAB at 09:39

## 2018-04-27 RX ADMIN — HYDROMORPHONE HYDROCHLORIDE 1 MG: 1 INJECTION, SOLUTION INTRAMUSCULAR; INTRAVENOUS; SUBCUTANEOUS at 05:22

## 2018-04-27 RX ADMIN — Medication 1 CAP: at 09:40

## 2018-04-27 RX ADMIN — ANALGESIC BALM 1 APPLIC: 1.74; 4.06 OINTMENT TOPICAL at 05:22

## 2018-04-27 RX ADMIN — DICLOFENAC 1 GM: 10 GEL TOPICAL at 21:01

## 2018-04-27 RX ADMIN — ACYCLOVIR 1 MG: 400 TABLET ORAL at 20:59

## 2018-04-27 RX ADMIN — INSULIN ASPART 1 UNIT: 100 INJECTION, SOLUTION INTRAVENOUS; SUBCUTANEOUS at 08:00

## 2018-04-27 RX ADMIN — CASTOR OIL AND BALSAM, PERU 1 APPLIC: 788; 87 OINTMENT TOPICAL at 09:41

## 2018-04-27 RX ADMIN — DICLOFENAC 1 GM: 10 GEL TOPICAL at 16:59

## 2018-04-27 RX ADMIN — PREGABALIN 1 MG: 75 CAPSULE ORAL at 21:00

## 2018-04-27 RX ADMIN — PREGABALIN 1 MG: 75 CAPSULE ORAL at 09:45

## 2018-04-27 RX ADMIN — DICLOFENAC 1 GM: 10 GEL TOPICAL at 12:41

## 2018-04-27 RX ADMIN — PANTOPRAZOLE SODIUM 1 MG: 40 TABLET, DELAYED RELEASE ORAL at 05:22

## 2018-04-28 RX ADMIN — DICLOFENAC 1 GM: 10 GEL TOPICAL at 08:31

## 2018-04-28 RX ADMIN — Medication 1 CAP: at 13:28

## 2018-04-28 RX ADMIN — PANTOPRAZOLE SODIUM 1 MG: 40 TABLET, DELAYED RELEASE ORAL at 05:07

## 2018-04-28 RX ADMIN — VITAMIN D, TAB 1000IU (100/BT) 1 UNIT: 25 TAB at 08:30

## 2018-04-28 RX ADMIN — CLINDAMYCIN IN 5 PERCENT DEXTROSE 1 MLS/HR: 12 INJECTION, SOLUTION INTRAVENOUS at 15:00

## 2018-04-28 RX ADMIN — INSULIN ASPART 1 UNIT: 100 INJECTION, SOLUTION INTRAVENOUS; SUBCUTANEOUS at 08:00

## 2018-04-28 RX ADMIN — CLINDAMYCIN IN 5 PERCENT DEXTROSE 1 MLS/HR: 12 INJECTION, SOLUTION INTRAVENOUS at 17:16

## 2018-04-28 RX ADMIN — HYDROMORPHONE HYDROCHLORIDE 1 MG: 1 INJECTION, SOLUTION INTRAMUSCULAR; INTRAVENOUS; SUBCUTANEOUS at 05:11

## 2018-04-28 RX ADMIN — HYDROMORPHONE HYDROCHLORIDE 1 MG: 1 INJECTION, SOLUTION INTRAMUSCULAR; INTRAVENOUS; SUBCUTANEOUS at 23:57

## 2018-04-28 RX ADMIN — PREGABALIN 1 MG: 75 CAPSULE ORAL at 08:30

## 2018-04-28 RX ADMIN — HYDROMORPHONE HYDROCHLORIDE 1 MG: 1 INJECTION, SOLUTION INTRAMUSCULAR; INTRAVENOUS; SUBCUTANEOUS at 17:17

## 2018-04-28 RX ADMIN — DICLOFENAC 1 GM: 10 GEL TOPICAL at 17:15

## 2018-04-28 RX ADMIN — DICLOFENAC 1 GM: 10 GEL TOPICAL at 20:41

## 2018-04-28 RX ADMIN — Medication 1 CAP: at 20:40

## 2018-04-28 RX ADMIN — ANALGESIC BALM 1 APPLIC: 1.74; 4.06 OINTMENT TOPICAL at 05:09

## 2018-04-28 RX ADMIN — HYDROMORPHONE HYDROCHLORIDE 1 MG: 1 INJECTION, SOLUTION INTRAMUSCULAR; INTRAVENOUS; SUBCUTANEOUS at 01:14

## 2018-04-28 RX ADMIN — DICLOFENAC 1 GM: 10 GEL TOPICAL at 13:28

## 2018-04-28 RX ADMIN — EPOETIN ALFA 1 UNITS: 10000 SOLUTION INTRAVENOUS; SUBCUTANEOUS at 17:00

## 2018-04-28 RX ADMIN — INSULIN ASPART 1 UNIT: 100 INJECTION, SOLUTION INTRAVENOUS; SUBCUTANEOUS at 11:57

## 2018-04-28 RX ADMIN — INSULIN ASPART 1 UNIT: 100 INJECTION, SOLUTION INTRAVENOUS; SUBCUTANEOUS at 20:41

## 2018-04-28 RX ADMIN — VITAMIN A AND D 1 APPLIC: 30.8 OINTMENT TOPICAL at 08:30

## 2018-04-28 RX ADMIN — CLINDAMYCIN IN 5 PERCENT DEXTROSE 1 MLS/HR: 12 INJECTION, SOLUTION INTRAVENOUS at 05:07

## 2018-04-28 RX ADMIN — HYDROMORPHONE HYDROCHLORIDE 1 MG: 1 INJECTION, SOLUTION INTRAMUSCULAR; INTRAVENOUS; SUBCUTANEOUS at 12:24

## 2018-04-28 RX ADMIN — INSULIN ASPART 1 UNIT: 100 INJECTION, SOLUTION INTRAVENOUS; SUBCUTANEOUS at 17:15

## 2018-04-28 RX ADMIN — CLINDAMYCIN IN 5 PERCENT DEXTROSE 1 MLS/HR: 12 INJECTION, SOLUTION INTRAVENOUS at 23:57

## 2018-04-28 RX ADMIN — ANALGESIC BALM 1 APPLIC: 1.74; 4.06 OINTMENT TOPICAL at 17:16

## 2018-04-28 RX ADMIN — Medication 1 CAP: at 08:30

## 2018-04-28 RX ADMIN — RIFAMPIN 1 MG: 300 CAPSULE ORAL at 08:30

## 2018-04-28 RX ADMIN — ACYCLOVIR 1 MG: 400 TABLET ORAL at 20:40

## 2018-04-28 RX ADMIN — ANALGESIC BALM 1 APPLIC: 1.74; 4.06 OINTMENT TOPICAL at 11:58

## 2018-04-28 RX ADMIN — ACETAMINOPHEN 1 MG: 325 TABLET, FILM COATED ORAL at 09:22

## 2018-04-28 RX ADMIN — ACYCLOVIR 1 MG: 400 TABLET ORAL at 08:30

## 2018-04-28 RX ADMIN — CASTOR OIL AND BALSAM, PERU 1 APPLIC: 788; 87 OINTMENT TOPICAL at 20:42

## 2018-04-28 RX ADMIN — CASTOR OIL AND BALSAM, PERU 1 APPLIC: 788; 87 OINTMENT TOPICAL at 08:30

## 2018-04-28 RX ADMIN — ANALGESIC BALM 1 APPLIC: 1.74; 4.06 OINTMENT TOPICAL at 00:00

## 2018-04-28 RX ADMIN — PREGABALIN 1 MG: 75 CAPSULE ORAL at 20:40

## 2018-04-29 RX ADMIN — Medication 1 CAP: at 08:48

## 2018-04-29 RX ADMIN — DICLOFENAC 1 GM: 10 GEL TOPICAL at 17:00

## 2018-04-29 RX ADMIN — PANTOPRAZOLE SODIUM 1 MG: 40 TABLET, DELAYED RELEASE ORAL at 06:26

## 2018-04-29 RX ADMIN — INSULIN ASPART 1 UNIT: 100 INJECTION, SOLUTION INTRAVENOUS; SUBCUTANEOUS at 21:00

## 2018-04-29 RX ADMIN — DICLOFENAC 1 GM: 10 GEL TOPICAL at 08:49

## 2018-04-29 RX ADMIN — INSULIN ASPART 1 UNIT: 100 INJECTION, SOLUTION INTRAVENOUS; SUBCUTANEOUS at 17:24

## 2018-04-29 RX ADMIN — ACYCLOVIR 1 MG: 400 TABLET ORAL at 08:48

## 2018-04-29 RX ADMIN — CLINDAMYCIN IN 5 PERCENT DEXTROSE 1 MLS/HR: 12 INJECTION, SOLUTION INTRAVENOUS at 14:09

## 2018-04-29 RX ADMIN — Medication 1 CAP: at 21:53

## 2018-04-29 RX ADMIN — VITAMIN D, TAB 1000IU (100/BT) 1 UNIT: 25 TAB at 08:48

## 2018-04-29 RX ADMIN — INSULIN ASPART 1 UNIT: 100 INJECTION, SOLUTION INTRAVENOUS; SUBCUTANEOUS at 07:57

## 2018-04-29 RX ADMIN — DICLOFENAC 1 GM: 10 GEL TOPICAL at 12:16

## 2018-04-29 RX ADMIN — DICLOFENAC 1 GM: 10 GEL TOPICAL at 21:55

## 2018-04-29 RX ADMIN — ANALGESIC BALM 1 APPLIC: 1.74; 4.06 OINTMENT TOPICAL at 06:00

## 2018-04-29 RX ADMIN — INSULIN ASPART 1 UNIT: 100 INJECTION, SOLUTION INTRAVENOUS; SUBCUTANEOUS at 12:00

## 2018-04-29 RX ADMIN — Medication 1 CAP: at 12:15

## 2018-04-29 RX ADMIN — PREGABALIN 1 MG: 75 CAPSULE ORAL at 21:53

## 2018-04-29 RX ADMIN — ANALGESIC BALM 1 APPLIC: 1.74; 4.06 OINTMENT TOPICAL at 00:00

## 2018-04-29 RX ADMIN — ACYCLOVIR 1 MG: 400 TABLET ORAL at 21:53

## 2018-04-29 RX ADMIN — CASTOR OIL AND BALSAM, PERU 1 APPLIC: 788; 87 OINTMENT TOPICAL at 21:54

## 2018-04-29 RX ADMIN — RIFAMPIN 1 MG: 300 CAPSULE ORAL at 08:48

## 2018-04-29 RX ADMIN — CLINDAMYCIN IN 5 PERCENT DEXTROSE 1 MLS/HR: 12 INJECTION, SOLUTION INTRAVENOUS at 06:26

## 2018-04-29 RX ADMIN — VITAMIN A AND D 1 APPLIC: 30.8 OINTMENT TOPICAL at 08:48

## 2018-04-29 RX ADMIN — ANALGESIC BALM 1 APPLIC: 1.74; 4.06 OINTMENT TOPICAL at 17:24

## 2018-04-29 RX ADMIN — CASTOR OIL AND BALSAM, PERU 1 APPLIC: 788; 87 OINTMENT TOPICAL at 08:49

## 2018-04-29 RX ADMIN — HYDROMORPHONE HYDROCHLORIDE 1 MG: 1 INJECTION, SOLUTION INTRAMUSCULAR; INTRAVENOUS; SUBCUTANEOUS at 04:03

## 2018-04-29 RX ADMIN — SODIUM CHLORIDE 1 MLS/HR: 9 INJECTION, SOLUTION INTRAVENOUS at 18:31

## 2018-04-29 RX ADMIN — HYDROMORPHONE HYDROCHLORIDE 1 MG: 1 INJECTION, SOLUTION INTRAMUSCULAR; INTRAVENOUS; SUBCUTANEOUS at 10:38

## 2018-04-29 RX ADMIN — PREGABALIN 1 MG: 75 CAPSULE ORAL at 08:48

## 2018-04-29 RX ADMIN — ACETAMINOPHEN 1 MG: 325 TABLET, FILM COATED ORAL at 03:15

## 2018-04-29 RX ADMIN — HYDROMORPHONE HYDROCHLORIDE 1 MG: 1 INJECTION, SOLUTION INTRAMUSCULAR; INTRAVENOUS; SUBCUTANEOUS at 15:58

## 2018-04-29 RX ADMIN — HYDROMORPHONE HYDROCHLORIDE 1 MG: 1 INJECTION, SOLUTION INTRAMUSCULAR; INTRAVENOUS; SUBCUTANEOUS at 20:10

## 2018-04-29 RX ADMIN — ANALGESIC BALM 1 APPLIC: 1.74; 4.06 OINTMENT TOPICAL at 12:00

## 2018-04-30 LAB
ADD MAN DIFF?: NO
ANION GAP: 12 (ref 8–16)
BASOPHIL #: 0 10^3/UL (ref 0–0.1)
BASOPHILS %: 0.6 % (ref 0–2)
BLOOD UREA NITROGEN: 22 MG/DL (ref 7–20)
CALCIUM: 8.7 MG/DL (ref 8.4–10.2)
CARBON DIOXIDE: 30 MMOL/L (ref 21–31)
CHLORIDE: 102 MMOL/L (ref 97–110)
CREATININE: 3.61 MG/DL (ref 0.44–1)
EOSINOPHILS #: 0.1 10^3/UL (ref 0–0.5)
EOSINOPHILS %: 2.7 % (ref 0–7)
GLUCOSE: 60 MG/DL (ref 70–220)
HEMATOCRIT: 23.8 % (ref 37–47)
HEMOGLOBIN: 7.9 G/DL (ref 12–16)
LYMPHOCYTES #: 0.7 10^3/UL (ref 0.8–2.9)
LYMPHOCYTES %: 21.8 % (ref 15–51)
MAGNESIUM: 1.9 MG/DL (ref 1.7–2.5)
MEAN CORPUSCULAR HEMOGLOBIN: 33.5 PG (ref 29–33)
MEAN CORPUSCULAR HGB CONC: 33.2 G/DL (ref 32–37)
MEAN CORPUSCULAR VOLUME: 100.8 FL (ref 82–101)
MEAN PLATELET VOLUME: 10.6 FL (ref 7.4–10.4)
MONOCYTE #: 0.4 10^3/UL (ref 0.3–0.9)
MONOCYTES %: 10.6 % (ref 0–11)
NEUTROPHIL #: 2.1 10^3/UL (ref 1.6–7.5)
NEUTROPHILS %: 64 % (ref 39–77)
NUCLEATED RED BLOOD CELLS #: 0 10^3/UL (ref 0–0)
NUCLEATED RED BLOOD CELLS%: 0 /100WBC (ref 0–0)
PHOSPHORUS: 4.3 MG/DL (ref 2.5–4.9)
PLATELET COUNT: 195 10^3/UL (ref 140–415)
POTASSIUM: 4.9 MMOL/L (ref 3.5–5.1)
RED BLOOD COUNT: 2.36 10^6/UL (ref 4.2–5.4)
RED CELL DISTRIBUTION WIDTH: 21.2 % (ref 11.5–14.5)
SODIUM: 139 MMOL/L (ref 135–144)
WHITE BLOOD COUNT: 3.3 10^3/UL (ref 4.8–10.8)

## 2018-04-30 RX ADMIN — DICLOFENAC 1 GM: 10 GEL TOPICAL at 13:12

## 2018-04-30 RX ADMIN — ACYCLOVIR 1 MG: 400 TABLET ORAL at 23:14

## 2018-04-30 RX ADMIN — ANALGESIC BALM 1 APPLIC: 1.74; 4.06 OINTMENT TOPICAL at 06:00

## 2018-04-30 RX ADMIN — HYDROMORPHONE HYDROCHLORIDE 1 MG: 1 INJECTION, SOLUTION INTRAMUSCULAR; INTRAVENOUS; SUBCUTANEOUS at 01:56

## 2018-04-30 RX ADMIN — ANALGESIC BALM 1 APPLIC: 1.74; 4.06 OINTMENT TOPICAL at 12:02

## 2018-04-30 RX ADMIN — Medication 1 CAP: at 23:14

## 2018-04-30 RX ADMIN — NYSTATIN 1 APPLIC: 100000 POWDER TOPICAL at 17:38

## 2018-04-30 RX ADMIN — Medication 1 CAP: at 12:01

## 2018-04-30 RX ADMIN — DICLOFENAC 1 GM: 10 GEL TOPICAL at 09:14

## 2018-04-30 RX ADMIN — DICLOFENAC 1 GM: 10 GEL TOPICAL at 23:15

## 2018-04-30 RX ADMIN — ACYCLOVIR 1 MG: 400 TABLET ORAL at 09:13

## 2018-04-30 RX ADMIN — CASTOR OIL AND BALSAM, PERU 1 APPLIC: 788; 87 OINTMENT TOPICAL at 23:17

## 2018-04-30 RX ADMIN — HYDROMORPHONE HYDROCHLORIDE 1 MG: 1 INJECTION, SOLUTION INTRAMUSCULAR; INTRAVENOUS; SUBCUTANEOUS at 23:07

## 2018-04-30 RX ADMIN — INSULIN ASPART 1 UNIT: 100 INJECTION, SOLUTION INTRAVENOUS; SUBCUTANEOUS at 17:31

## 2018-04-30 RX ADMIN — RIFAMPIN 1 MG: 300 CAPSULE ORAL at 09:13

## 2018-04-30 RX ADMIN — CASTOR OIL AND BALSAM, PERU 1 APPLIC: 788; 87 OINTMENT TOPICAL at 09:14

## 2018-04-30 RX ADMIN — INSULIN ASPART 1 UNIT: 100 INJECTION, SOLUTION INTRAVENOUS; SUBCUTANEOUS at 21:00

## 2018-04-30 RX ADMIN — HYDROMORPHONE HYDROCHLORIDE 1 MG: 1 INJECTION, SOLUTION INTRAMUSCULAR; INTRAVENOUS; SUBCUTANEOUS at 05:48

## 2018-04-30 RX ADMIN — PANTOPRAZOLE SODIUM 1 MG: 40 TABLET, DELAYED RELEASE ORAL at 05:41

## 2018-04-30 RX ADMIN — INSULIN ASPART 1 UNIT: 100 INJECTION, SOLUTION INTRAVENOUS; SUBCUTANEOUS at 12:00

## 2018-04-30 RX ADMIN — Medication 1 CAP: at 09:13

## 2018-04-30 RX ADMIN — ANALGESIC BALM 1 APPLIC: 1.74; 4.06 OINTMENT TOPICAL at 00:00

## 2018-04-30 RX ADMIN — VITAMIN D, TAB 1000IU (100/BT) 1 UNIT: 25 TAB at 09:56

## 2018-04-30 RX ADMIN — ANALGESIC BALM 1 APPLIC: 1.74; 4.06 OINTMENT TOPICAL at 23:16

## 2018-04-30 RX ADMIN — VITAMIN A AND D 1 APPLIC: 30.8 OINTMENT TOPICAL at 09:13

## 2018-04-30 RX ADMIN — NYSTATIN 1 APPLIC: 100000 POWDER TOPICAL at 23:22

## 2018-04-30 RX ADMIN — INSULIN ASPART 1 UNIT: 100 INJECTION, SOLUTION INTRAVENOUS; SUBCUTANEOUS at 08:00

## 2018-04-30 RX ADMIN — PREGABALIN 1 MG: 75 CAPSULE ORAL at 09:13

## 2018-04-30 RX ADMIN — DICLOFENAC 1 GM: 10 GEL TOPICAL at 17:33

## 2018-04-30 RX ADMIN — PREGABALIN 1 MG: 75 CAPSULE ORAL at 23:14

## 2018-04-30 RX ADMIN — ANALGESIC BALM 1 APPLIC: 1.74; 4.06 OINTMENT TOPICAL at 17:33

## 2018-04-30 RX ADMIN — ACETAMINOPHEN 1 MG: 325 TABLET, FILM COATED ORAL at 02:29

## 2018-04-30 RX ADMIN — HYDROMORPHONE HYDROCHLORIDE 1 MG: 1 INJECTION, SOLUTION INTRAMUSCULAR; INTRAVENOUS; SUBCUTANEOUS at 12:16

## 2018-04-30 RX ADMIN — HYDROMORPHONE HYDROCHLORIDE 1 MG: 1 INJECTION, SOLUTION INTRAMUSCULAR; INTRAVENOUS; SUBCUTANEOUS at 17:33

## 2018-05-01 LAB
ABNORMAL IP MESSAGE: 1
ADD MAN DIFF?: NO
ANION GAP: 13 (ref 8–16)
BASOPHIL #: 0 10^3/UL (ref 0–0.1)
BASOPHILS %: 0.3 % (ref 0–2)
BLOOD UREA NITROGEN: 28 MG/DL (ref 7–20)
CALCIUM: 8.6 MG/DL (ref 8.4–10.2)
CARBON DIOXIDE: 27 MMOL/L (ref 21–31)
CHLORIDE: 103 MMOL/L (ref 97–110)
CREATINE KINASE: 27 IU/L (ref 23–200)
CREATININE: 4.4 MG/DL (ref 0.44–1)
EOSINOPHILS #: 0.1 10^3/UL (ref 0–0.5)
EOSINOPHILS %: 2.5 % (ref 0–7)
GLUCOSE: 62 MG/DL (ref 70–220)
HEMATOCRIT: 25 % (ref 37–47)
HEMOGLOBIN: 8.1 G/DL (ref 12–16)
LYMPHOCYTES #: 0.8 10^3/UL (ref 0.8–2.9)
LYMPHOCYTES %: 21.3 % (ref 15–51)
MAGNESIUM: 1.9 MG/DL (ref 1.7–2.5)
MEAN CORPUSCULAR HEMOGLOBIN: 33.2 PG (ref 29–33)
MEAN CORPUSCULAR HGB CONC: 32.4 G/DL (ref 32–37)
MEAN CORPUSCULAR VOLUME: 102.5 FL (ref 82–101)
MEAN PLATELET VOLUME: 10.4 FL (ref 7.4–10.4)
MONOCYTE #: 0.4 10^3/UL (ref 0.3–0.9)
MONOCYTES %: 10.7 % (ref 0–11)
NEUTROPHIL #: 2.4 10^3/UL (ref 1.6–7.5)
NEUTROPHILS %: 64.7 % (ref 39–77)
NUCLEATED RED BLOOD CELLS #: 0 10^3/UL (ref 0–0)
NUCLEATED RED BLOOD CELLS%: 0 /100WBC (ref 0–0)
PHOSPHORUS: 4.2 MG/DL (ref 2.5–4.9)
PLATELET COUNT: 218 10^3/UL (ref 140–415)
POSITIVE DIFF: (no result)
POTASSIUM: 4.6 MMOL/L (ref 3.5–5.1)
RED BLOOD COUNT: 2.44 10^6/UL (ref 4.2–5.4)
RED CELL DISTRIBUTION WIDTH: 22.2 % (ref 11.5–14.5)
SODIUM: 138 MMOL/L (ref 135–144)
WHITE BLOOD COUNT: 3.7 10^3/UL (ref 4.8–10.8)

## 2018-05-01 RX ADMIN — ACETAMINOPHEN 1 MG: 325 TABLET, FILM COATED ORAL at 22:07

## 2018-05-01 RX ADMIN — VITAMIN D, TAB 1000IU (100/BT) 1 UNIT: 25 TAB at 09:09

## 2018-05-01 RX ADMIN — INSULIN ASPART 1 UNIT: 100 INJECTION, SOLUTION INTRAVENOUS; SUBCUTANEOUS at 08:00

## 2018-05-01 RX ADMIN — ANALGESIC BALM 1 APPLIC: 1.74; 4.06 OINTMENT TOPICAL at 17:44

## 2018-05-01 RX ADMIN — DICLOFENAC 1 GM: 10 GEL TOPICAL at 17:00

## 2018-05-01 RX ADMIN — NYSTATIN 1 APPLIC: 100000 POWDER TOPICAL at 09:13

## 2018-05-01 RX ADMIN — HYDROMORPHONE HYDROCHLORIDE 1 MG: 1 INJECTION, SOLUTION INTRAMUSCULAR; INTRAVENOUS; SUBCUTANEOUS at 22:03

## 2018-05-01 RX ADMIN — INSULIN ASPART 1 UNIT: 100 INJECTION, SOLUTION INTRAVENOUS; SUBCUTANEOUS at 17:43

## 2018-05-01 RX ADMIN — PREGABALIN 1 MG: 75 CAPSULE ORAL at 22:09

## 2018-05-01 RX ADMIN — DICLOFENAC 1 GM: 10 GEL TOPICAL at 09:14

## 2018-05-01 RX ADMIN — Medication 1 CAP: at 13:00

## 2018-05-01 RX ADMIN — SODIUM CHLORIDE 1 MLS/HR: 9 INJECTION, SOLUTION INTRAVENOUS at 17:47

## 2018-05-01 RX ADMIN — CASTOR OIL AND BALSAM, PERU 1 APPLIC: 788; 87 OINTMENT TOPICAL at 22:19

## 2018-05-01 RX ADMIN — HYDROMORPHONE HYDROCHLORIDE 1 MG: 1 INJECTION, SOLUTION INTRAMUSCULAR; INTRAVENOUS; SUBCUTANEOUS at 05:31

## 2018-05-01 RX ADMIN — Medication 1 CAP: at 22:09

## 2018-05-01 RX ADMIN — PANTOPRAZOLE SODIUM 1 MG: 40 TABLET, DELAYED RELEASE ORAL at 05:30

## 2018-05-01 RX ADMIN — Medication 1 CAP: at 09:09

## 2018-05-01 RX ADMIN — INSULIN ASPART 1 UNIT: 100 INJECTION, SOLUTION INTRAVENOUS; SUBCUTANEOUS at 21:00

## 2018-05-01 RX ADMIN — ACYCLOVIR 1 MG: 400 TABLET ORAL at 22:08

## 2018-05-01 RX ADMIN — ANALGESIC BALM 1 APPLIC: 1.74; 4.06 OINTMENT TOPICAL at 05:36

## 2018-05-01 RX ADMIN — ONDANSETRON HYDROCHLORIDE 1 MG: 2 INJECTION, SOLUTION INTRAMUSCULAR; INTRAVENOUS at 14:26

## 2018-05-01 RX ADMIN — NYSTATIN 1 APPLIC: 100000 POWDER TOPICAL at 22:19

## 2018-05-01 RX ADMIN — CASTOR OIL AND BALSAM, PERU 1 APPLIC: 788; 87 OINTMENT TOPICAL at 09:13

## 2018-05-01 RX ADMIN — VITAMIN A AND D 1 APPLIC: 30.8 OINTMENT TOPICAL at 09:09

## 2018-05-01 RX ADMIN — PREGABALIN 1 MG: 75 CAPSULE ORAL at 09:09

## 2018-05-01 RX ADMIN — ACETAMINOPHEN 1 MG: 325 TABLET, FILM COATED ORAL at 01:28

## 2018-05-01 RX ADMIN — RIFAMPIN 1 MG: 300 CAPSULE ORAL at 09:09

## 2018-05-01 RX ADMIN — DICLOFENAC 1 GM: 10 GEL TOPICAL at 13:00

## 2018-05-01 RX ADMIN — HYDROMORPHONE HYDROCHLORIDE 1 MG: 1 INJECTION, SOLUTION INTRAMUSCULAR; INTRAVENOUS; SUBCUTANEOUS at 13:48

## 2018-05-01 RX ADMIN — ACYCLOVIR 1 MG: 400 TABLET ORAL at 09:09

## 2018-05-01 RX ADMIN — ANALGESIC BALM 1 APPLIC: 1.74; 4.06 OINTMENT TOPICAL at 12:00

## 2018-05-01 RX ADMIN — DICLOFENAC 1 GM: 10 GEL TOPICAL at 22:18

## 2018-05-01 RX ADMIN — EPOETIN ALFA 1 UNITS: 3000 SOLUTION INTRAVENOUS; SUBCUTANEOUS at 17:39

## 2018-05-01 RX ADMIN — INSULIN ASPART 1 UNIT: 100 INJECTION, SOLUTION INTRAVENOUS; SUBCUTANEOUS at 12:00

## 2018-05-02 LAB
ABNORMAL IP MESSAGE: 1
ADD MAN DIFF?: YES
ADD UMIC: YES
ALBUMIN: 2.6 G/DL (ref 3.3–4.9)
ANION GAP: 14 (ref 8–16)
ANISOCYTOSIS: (no result) (ref 0–0)
BAND NEUTROPHILS #M: 0.2 10^3/UL (ref 0–0.6)
BAND NEUTROPHILS % (M): 7 % (ref 0–4)
BASOPHIL #M: 0 10^3/UL (ref 0–0)
BASOPHILS % (M): 1 % (ref 0–2)
BLOOD UREA NITROGEN: 16 MG/DL (ref 7–20)
CALCIUM: 8.5 MG/DL (ref 8.4–10.2)
CARBON DIOXIDE: 31 MMOL/L (ref 21–31)
CHLORIDE: 100 MMOL/L (ref 97–110)
CREATININE: 3.04 MG/DL (ref 0.44–1)
EOSINOPHILS % (M): 3 % (ref 0–7)
FOLATE: 9.1 NG/ML (ref 2.8–20)
GLUCOSE: 70 MG/DL (ref 70–220)
HEMATOCRIT: 26 % (ref 37–47)
HEMOGLOBIN: 8.6 G/DL (ref 12–16)
LYMPHOCYTES #M: 0.9 10^3/UL (ref 0.8–2.9)
LYMPHOCYTES % (M): 22 % (ref 15–51)
MACROCYTOSIS: (no result) (ref 0–0)
MAGNESIUM: 1.9 MG/DL (ref 1.7–2.5)
MEAN CORPUSCULAR HEMOGLOBIN: 33.9 PG (ref 29–33)
MEAN CORPUSCULAR HGB CONC: 33.1 G/DL (ref 32–37)
MEAN CORPUSCULAR VOLUME: 102.4 FL (ref 82–101)
MEAN PLATELET VOLUME: 10.3 FL (ref 7.4–10.4)
MONOCYTE #M: 0.5 10^3/UL (ref 0.3–0.9)
MONOCYTES % (M): 12 % (ref 0–11)
NUCLEATED RED BLOOD CELLS%: 0 /100WBC (ref 0–0)
PHOSPHORUS: 3.4 MG/DL (ref 2.5–4.9)
PLATELET COUNT: 248 10^3/UL (ref 140–415)
PLATELET ESTIMATE: NORMAL
POLYCHROMASIA: (no result) (ref 0–0)
POSITIVE DIFF: (no result)
POTASSIUM: 4.6 MMOL/L (ref 3.5–5.1)
PROMYELOCYTES #M: 0 10^3/UL (ref 0–0)
PROMYELOCYTES % (M): 1 % (ref 0–0)
REACTIVE LYMPHOCYTES #M: 0 10^3/UL (ref 0–0)
REACTIVE LYMPHOCYTES% (M): 2 % (ref 0–0)
RED BLOOD COUNT: 2.54 10^6/UL (ref 4.2–5.4)
SEG NEUT #M: 2.2 10^3/UL (ref 1.6–7.5)
SEGMENTED NEUTROPHILS (M) %: 52 % (ref 39–77)
SMUDGE%M: 1 % (ref 0–0)
SODIUM: 140 MMOL/L (ref 135–144)
UR ASCORBIC ACID: NEGATIVE MG/DL
UR BACTERIA: (no result) /HPF
UR BILIRUBIN (DIP): NEGATIVE MG/DL
UR BLOOD (DIP): (no result) MG/DL
UR CLARITY: (no result)
UR COLOR: (no result)
UR GLUCOSE (DIP): NEGATIVE MG/DL
UR KETONES (DIP): NEGATIVE MG/DL
UR LEUKOCYTE ESTERASE (DIP): (no result) LEU/UL
UR NITRITE (DIP): NEGATIVE MG/DL
UR PH (DIP): 8 (ref 5–9)
UR RBC: 29 /HPF (ref 0–5)
UR SPECIFIC GRAVITY (DIP): 1.01 (ref 1–1.03)
UR SQUAMOUS EPITHELIAL CELL: (no result) /HPF
UR TOTAL PROTEIN (DIP): (no result) MG/DL
UR UROBILINOGEN (DIP): NEGATIVE MG/DL
UR WBC: > 182 /HPF (ref 0–5)
VITAMIN B12: > 1000 PG/ML (ref 239–931)
WHITE BLOOD COUNT: 4.2 10^3/UL (ref 4.8–10.8)

## 2018-05-02 RX ADMIN — HYDROMORPHONE HYDROCHLORIDE 1 MG: 1 INJECTION, SOLUTION INTRAMUSCULAR; INTRAVENOUS; SUBCUTANEOUS at 18:44

## 2018-05-02 RX ADMIN — RIFAMPIN 1 MG: 300 CAPSULE ORAL at 08:49

## 2018-05-02 RX ADMIN — ANALGESIC BALM 1 APPLIC: 1.74; 4.06 OINTMENT TOPICAL at 05:41

## 2018-05-02 RX ADMIN — INSULIN ASPART 1 UNIT: 100 INJECTION, SOLUTION INTRAVENOUS; SUBCUTANEOUS at 21:00

## 2018-05-02 RX ADMIN — DICLOFENAC 1 GM: 10 GEL TOPICAL at 08:50

## 2018-05-02 RX ADMIN — ONDANSETRON HYDROCHLORIDE 1 MG: 2 INJECTION, SOLUTION INTRAMUSCULAR; INTRAVENOUS at 13:49

## 2018-05-02 RX ADMIN — NYSTATIN 1 APPLIC: 100000 POWDER TOPICAL at 08:49

## 2018-05-02 RX ADMIN — HYDROMORPHONE HYDROCHLORIDE 1 MG: 1 INJECTION, SOLUTION INTRAMUSCULAR; INTRAVENOUS; SUBCUTANEOUS at 03:08

## 2018-05-02 RX ADMIN — VITAMIN A AND D 1 APPLIC: 30.8 OINTMENT TOPICAL at 08:49

## 2018-05-02 RX ADMIN — Medication 1 CAP: at 21:46

## 2018-05-02 RX ADMIN — HYDROMORPHONE HYDROCHLORIDE 1 MG: 1 INJECTION, SOLUTION INTRAMUSCULAR; INTRAVENOUS; SUBCUTANEOUS at 13:49

## 2018-05-02 RX ADMIN — INSULIN ASPART 1 UNIT: 100 INJECTION, SOLUTION INTRAVENOUS; SUBCUTANEOUS at 17:44

## 2018-05-02 RX ADMIN — ACYCLOVIR 1 MG: 400 TABLET ORAL at 08:49

## 2018-05-02 RX ADMIN — VITAMIN D, TAB 1000IU (100/BT) 1 UNIT: 25 TAB at 08:49

## 2018-05-02 RX ADMIN — INSULIN ASPART 1 UNIT: 100 INJECTION, SOLUTION INTRAVENOUS; SUBCUTANEOUS at 12:00

## 2018-05-02 RX ADMIN — Medication 1 CAP: at 12:51

## 2018-05-02 RX ADMIN — DICLOFENAC 1 GM: 10 GEL TOPICAL at 21:56

## 2018-05-02 RX ADMIN — Medication 1 CAP: at 08:49

## 2018-05-02 RX ADMIN — DICLOFENAC 1 GM: 10 GEL TOPICAL at 12:51

## 2018-05-02 RX ADMIN — CASTOR OIL AND BALSAM, PERU 1 APPLIC: 788; 87 OINTMENT TOPICAL at 21:56

## 2018-05-02 RX ADMIN — PREGABALIN 1 MG: 75 CAPSULE ORAL at 08:49

## 2018-05-02 RX ADMIN — NYSTATIN 1 APPLIC: 100000 POWDER TOPICAL at 21:56

## 2018-05-02 RX ADMIN — ANALGESIC BALM 1 APPLIC: 1.74; 4.06 OINTMENT TOPICAL at 12:02

## 2018-05-02 RX ADMIN — CASTOR OIL AND BALSAM, PERU 1 APPLIC: 788; 87 OINTMENT TOPICAL at 08:49

## 2018-05-02 RX ADMIN — ANALGESIC BALM 1 APPLIC: 1.74; 4.06 OINTMENT TOPICAL at 03:04

## 2018-05-02 RX ADMIN — INSULIN ASPART 1 UNIT: 100 INJECTION, SOLUTION INTRAVENOUS; SUBCUTANEOUS at 08:00

## 2018-05-02 RX ADMIN — ACYCLOVIR 1 MG: 400 TABLET ORAL at 21:47

## 2018-05-02 RX ADMIN — ANALGESIC BALM 1 APPLIC: 1.74; 4.06 OINTMENT TOPICAL at 17:49

## 2018-05-02 RX ADMIN — HYDROMORPHONE HYDROCHLORIDE 1 MG: 1 INJECTION, SOLUTION INTRAMUSCULAR; INTRAVENOUS; SUBCUTANEOUS at 08:48

## 2018-05-02 RX ADMIN — PANTOPRAZOLE SODIUM 1 MG: 40 TABLET, DELAYED RELEASE ORAL at 05:35

## 2018-05-02 RX ADMIN — MEROPENEM 1 MLS/HR: 500 INJECTION INTRAVENOUS at 13:36

## 2018-05-02 RX ADMIN — PREGABALIN 1 MG: 75 CAPSULE ORAL at 21:46

## 2018-05-02 RX ADMIN — DICLOFENAC 1 GM: 10 GEL TOPICAL at 17:48

## 2018-05-02 RX ADMIN — ACETAMINOPHEN 1 MG: 325 TABLET, FILM COATED ORAL at 15:58

## 2018-05-03 LAB
ABNORMAL IP MESSAGE: 1
ADD MAN DIFF?: NO
ALBUMIN: 2.3 G/DL (ref 3.3–4.9)
ANION GAP: 13 (ref 8–16)
BASOPHIL #: 0 10^3/UL (ref 0–0.1)
BASOPHILS %: 0.3 % (ref 0–2)
BLOOD UREA NITROGEN: 21 MG/DL (ref 7–20)
CALCIUM: 8.4 MG/DL (ref 8.4–10.2)
CARBON DIOXIDE: 28 MMOL/L (ref 21–31)
CHLORIDE: 100 MMOL/L (ref 97–110)
CREATININE: 3.84 MG/DL (ref 0.44–1)
EOSINOPHILS #: 0.1 10^3/UL (ref 0–0.5)
EOSINOPHILS %: 3.5 % (ref 0–7)
GLUCOSE: 146 MG/DL (ref 70–220)
HEMATOCRIT: 23.6 % (ref 37–47)
HEMOGLOBIN: 7.9 G/DL (ref 12–16)
LYMPHOCYTES #: 0.5 10^3/UL (ref 0.8–2.9)
LYMPHOCYTES %: 16 % (ref 15–51)
MAGNESIUM: 1.9 MG/DL (ref 1.7–2.5)
MEAN CORPUSCULAR HEMOGLOBIN: 34.5 PG (ref 29–33)
MEAN CORPUSCULAR HGB CONC: 33.5 G/DL (ref 32–37)
MEAN CORPUSCULAR VOLUME: 103.1 FL (ref 82–101)
MEAN PLATELET VOLUME: 10 FL (ref 7.4–10.4)
MONOCYTE #: 0.5 10^3/UL (ref 0.3–0.9)
MONOCYTES %: 15.4 % (ref 0–11)
NEUTROPHIL #: 2 10^3/UL (ref 1.6–7.5)
NEUTROPHILS %: 64.2 % (ref 39–77)
NUCLEATED RED BLOOD CELLS #: 0 10^3/UL (ref 0–0)
NUCLEATED RED BLOOD CELLS%: 0 /100WBC (ref 0–0)
PHOSPHORUS: 3.9 MG/DL (ref 2.5–4.9)
PLATELET COUNT: 204 10^3/UL (ref 140–415)
POSITIVE DIFF: (no result)
POTASSIUM: 3.8 MMOL/L (ref 3.5–5.1)
RED BLOOD COUNT: 2.29 10^6/UL (ref 4.2–5.4)
RED CELL DISTRIBUTION WIDTH: 23 % (ref 11.5–14.5)
SODIUM: 137 MMOL/L (ref 135–144)
WHITE BLOOD COUNT: 3.1 10^3/UL (ref 4.8–10.8)

## 2018-05-03 RX ADMIN — DICLOFENAC 1 GM: 10 GEL TOPICAL at 21:16

## 2018-05-03 RX ADMIN — DICLOFENAC 1 GM: 10 GEL TOPICAL at 13:15

## 2018-05-03 RX ADMIN — ANALGESIC BALM 1 APPLIC: 1.74; 4.06 OINTMENT TOPICAL at 17:11

## 2018-05-03 RX ADMIN — ANALGESIC BALM 1 APPLIC: 1.74; 4.06 OINTMENT TOPICAL at 00:50

## 2018-05-03 RX ADMIN — ACYCLOVIR 1 MG: 400 TABLET ORAL at 08:48

## 2018-05-03 RX ADMIN — HYDROCODONE BITARTRATE AND ACETAMINOPHEN 1 TAB: 5; 325 TABLET ORAL at 08:49

## 2018-05-03 RX ADMIN — ANALGESIC BALM 1 APPLIC: 1.74; 4.06 OINTMENT TOPICAL at 12:21

## 2018-05-03 RX ADMIN — DEXTROSE MONOHYDRATE 1 ML: 500 INJECTION PARENTERAL at 04:34

## 2018-05-03 RX ADMIN — ANALGESIC BALM 1 APPLIC: 1.74; 4.06 OINTMENT TOPICAL at 23:43

## 2018-05-03 RX ADMIN — Medication 1 CAP: at 08:47

## 2018-05-03 RX ADMIN — MEROPENEM 1 MLS/HR: 500 INJECTION INTRAVENOUS at 13:24

## 2018-05-03 RX ADMIN — INSULIN ASPART 1 UNIT: 100 INJECTION, SOLUTION INTRAVENOUS; SUBCUTANEOUS at 12:00

## 2018-05-03 RX ADMIN — HYDROMORPHONE HYDROCHLORIDE 1 MG: 1 INJECTION, SOLUTION INTRAMUSCULAR; INTRAVENOUS; SUBCUTANEOUS at 15:20

## 2018-05-03 RX ADMIN — NYSTATIN 1 APPLIC: 100000 POWDER TOPICAL at 08:48

## 2018-05-03 RX ADMIN — DICLOFENAC 1 GM: 10 GEL TOPICAL at 08:48

## 2018-05-03 RX ADMIN — INSULIN ASPART 1 UNIT: 100 INJECTION, SOLUTION INTRAVENOUS; SUBCUTANEOUS at 21:00

## 2018-05-03 RX ADMIN — Medication 1 CAP: at 21:11

## 2018-05-03 RX ADMIN — Medication 1 CAP: at 13:24

## 2018-05-03 RX ADMIN — PANTOPRAZOLE SODIUM 1 MG: 40 TABLET, DELAYED RELEASE ORAL at 05:44

## 2018-05-03 RX ADMIN — HYDROMORPHONE HYDROCHLORIDE 1 MG: 1 INJECTION, SOLUTION INTRAMUSCULAR; INTRAVENOUS; SUBCUTANEOUS at 00:48

## 2018-05-03 RX ADMIN — NYSTATIN 1 APPLIC: 100000 POWDER TOPICAL at 21:16

## 2018-05-03 RX ADMIN — CASTOR OIL AND BALSAM, PERU 1 APPLIC: 788; 87 OINTMENT TOPICAL at 21:16

## 2018-05-03 RX ADMIN — HYDROMORPHONE HYDROCHLORIDE 1 MG: 1 INJECTION, SOLUTION INTRAMUSCULAR; INTRAVENOUS; SUBCUTANEOUS at 05:44

## 2018-05-03 RX ADMIN — HYDROCODONE BITARTRATE AND ACETAMINOPHEN 1 TAB: 5; 325 TABLET ORAL at 15:58

## 2018-05-03 RX ADMIN — ACYCLOVIR 1 MG: 400 TABLET ORAL at 21:11

## 2018-05-03 RX ADMIN — RIFAMPIN 1 MG: 300 CAPSULE ORAL at 08:48

## 2018-05-03 RX ADMIN — INSULIN ASPART 1 UNIT: 100 INJECTION, SOLUTION INTRAVENOUS; SUBCUTANEOUS at 17:53

## 2018-05-03 RX ADMIN — ANALGESIC BALM 1 APPLIC: 1.74; 4.06 OINTMENT TOPICAL at 05:46

## 2018-05-03 RX ADMIN — ONDANSETRON HYDROCHLORIDE 1 MG: 2 INJECTION, SOLUTION INTRAMUSCULAR; INTRAVENOUS at 16:59

## 2018-05-03 RX ADMIN — HYDROMORPHONE HYDROCHLORIDE 1 MG: 1 INJECTION, SOLUTION INTRAMUSCULAR; INTRAVENOUS; SUBCUTANEOUS at 19:28

## 2018-05-03 RX ADMIN — VITAMIN A AND D 1 APPLIC: 30.8 OINTMENT TOPICAL at 08:48

## 2018-05-03 RX ADMIN — EPOETIN ALFA 1 UNITS: 3000 SOLUTION INTRAVENOUS; SUBCUTANEOUS at 16:29

## 2018-05-03 RX ADMIN — HYDROMORPHONE HYDROCHLORIDE 1 MG: 1 INJECTION, SOLUTION INTRAMUSCULAR; INTRAVENOUS; SUBCUTANEOUS at 10:28

## 2018-05-03 RX ADMIN — CASTOR OIL AND BALSAM, PERU 1 APPLIC: 788; 87 OINTMENT TOPICAL at 08:48

## 2018-05-03 RX ADMIN — PREGABALIN 1 MG: 75 CAPSULE ORAL at 21:11

## 2018-05-03 RX ADMIN — FLUCONAZOLE 1 MG: 100 TABLET ORAL at 14:00

## 2018-05-03 RX ADMIN — DICLOFENAC 1 GM: 10 GEL TOPICAL at 17:11

## 2018-05-03 RX ADMIN — VITAMIN D, TAB 1000IU (100/BT) 1 UNIT: 25 TAB at 08:48

## 2018-05-03 RX ADMIN — DEXTROSE 1 GM: 15 GEL ORAL at 03:47

## 2018-05-03 RX ADMIN — SODIUM CHLORIDE 1 MLS/HR: 9 INJECTION, SOLUTION INTRAVENOUS at 18:57

## 2018-05-03 RX ADMIN — INSULIN ASPART 1 UNIT: 100 INJECTION, SOLUTION INTRAVENOUS; SUBCUTANEOUS at 08:00

## 2018-05-03 RX ADMIN — HYDROCODONE BITARTRATE AND ACETAMINOPHEN 1 TAB: 5; 325 TABLET ORAL at 23:11

## 2018-05-03 RX ADMIN — PREGABALIN 1 MG: 75 CAPSULE ORAL at 08:48

## 2018-05-04 LAB
ABNORMAL IP MESSAGE: 1
ADD MAN DIFF?: NO
ALBUMIN: 2.5 G/DL (ref 3.3–4.9)
ANION GAP: 15 (ref 8–16)
BASOPHIL #: 0 10^3/UL (ref 0–0.1)
BASOPHILS %: 0.8 % (ref 0–2)
BLOOD UREA NITROGEN: 27 MG/DL (ref 7–20)
CALCIUM: 8.4 MG/DL (ref 8.4–10.2)
CARBON DIOXIDE: 25 MMOL/L (ref 21–31)
CHLORIDE: 99 MMOL/L (ref 97–110)
CREATININE: 4.41 MG/DL (ref 0.44–1)
EOSINOPHILS #: 0.1 10^3/UL (ref 0–0.5)
EOSINOPHILS %: 3.5 % (ref 0–7)
GLUCOSE: 79 MG/DL (ref 70–220)
HEMATOCRIT: 24.1 % (ref 37–47)
HEMOGLOBIN: 8 G/DL (ref 12–16)
LYMPHOCYTES #: 0.8 10^3/UL (ref 0.8–2.9)
LYMPHOCYTES %: 21.9 % (ref 15–51)
MAGNESIUM: 1.9 MG/DL (ref 1.7–2.5)
MEAN CORPUSCULAR HEMOGLOBIN: 34 PG (ref 29–33)
MEAN CORPUSCULAR HGB CONC: 33.2 G/DL (ref 32–37)
MEAN CORPUSCULAR VOLUME: 102.6 FL (ref 82–101)
MEAN PLATELET VOLUME: 10 FL (ref 7.4–10.4)
MONOCYTE #: 0.5 10^3/UL (ref 0.3–0.9)
MONOCYTES %: 13.1 % (ref 0–11)
NEUTROPHIL #: 2.3 10^3/UL (ref 1.6–7.5)
NEUTROPHILS %: 60.2 % (ref 39–77)
NUCLEATED RED BLOOD CELLS #: 0 10^3/UL (ref 0–0)
NUCLEATED RED BLOOD CELLS%: 0 /100WBC (ref 0–0)
PHOSPHORUS: 4.5 MG/DL (ref 2.5–4.9)
PLATELET COUNT: 222 10^3/UL (ref 140–415)
POSITIVE DIFF: (no result)
POTASSIUM: 4.1 MMOL/L (ref 3.5–5.1)
RED BLOOD COUNT: 2.35 10^6/UL (ref 4.2–5.4)
RED CELL DISTRIBUTION WIDTH: 23.1 % (ref 11.5–14.5)
SODIUM: 135 MMOL/L (ref 135–144)
WHITE BLOOD COUNT: 3.8 10^3/UL (ref 4.8–10.8)

## 2018-05-04 RX ADMIN — RIFAMPIN 1 MG: 300 CAPSULE ORAL at 09:59

## 2018-05-04 RX ADMIN — PANTOPRAZOLE SODIUM 1 MG: 40 TABLET, DELAYED RELEASE ORAL at 05:24

## 2018-05-04 RX ADMIN — ACYCLOVIR 1 MG: 400 TABLET ORAL at 20:50

## 2018-05-04 RX ADMIN — MEROPENEM 1 MLS/HR: 500 INJECTION INTRAVENOUS at 13:35

## 2018-05-04 RX ADMIN — HYDROCODONE BITARTRATE AND ACETAMINOPHEN 1 TAB: 5; 325 TABLET ORAL at 03:26

## 2018-05-04 RX ADMIN — ANALGESIC BALM 1 APPLIC: 1.74; 4.06 OINTMENT TOPICAL at 17:27

## 2018-05-04 RX ADMIN — INSULIN ASPART 1 UNIT: 100 INJECTION, SOLUTION INTRAVENOUS; SUBCUTANEOUS at 08:00

## 2018-05-04 RX ADMIN — VITAMIN A AND D 1 APPLIC: 30.8 OINTMENT TOPICAL at 09:59

## 2018-05-04 RX ADMIN — PREGABALIN 1 MG: 75 CAPSULE ORAL at 20:49

## 2018-05-04 RX ADMIN — HYDROMORPHONE HYDROCHLORIDE 1 MG: 1 INJECTION, SOLUTION INTRAMUSCULAR; INTRAVENOUS; SUBCUTANEOUS at 07:52

## 2018-05-04 RX ADMIN — ANALGESIC BALM 1 APPLIC: 1.74; 4.06 OINTMENT TOPICAL at 05:27

## 2018-05-04 RX ADMIN — HYDROCODONE BITARTRATE AND ACETAMINOPHEN 1 TAB: 5; 325 TABLET ORAL at 20:48

## 2018-05-04 RX ADMIN — CITALOPRAM HYDROBROMIDE 1 MG: 20 TABLET ORAL at 09:59

## 2018-05-04 RX ADMIN — Medication 1 CAP: at 20:51

## 2018-05-04 RX ADMIN — DICLOFENAC 1 GM: 10 GEL TOPICAL at 10:02

## 2018-05-04 RX ADMIN — CASTOR OIL AND BALSAM, PERU 1 APPLIC: 788; 87 OINTMENT TOPICAL at 21:25

## 2018-05-04 RX ADMIN — ANALGESIC BALM 1 APPLIC: 1.74; 4.06 OINTMENT TOPICAL at 23:41

## 2018-05-04 RX ADMIN — ACYCLOVIR 1 MG: 400 TABLET ORAL at 09:59

## 2018-05-04 RX ADMIN — INSULIN ASPART 1 UNIT: 100 INJECTION, SOLUTION INTRAVENOUS; SUBCUTANEOUS at 17:26

## 2018-05-04 RX ADMIN — NYSTATIN 1 APPLIC: 100000 POWDER TOPICAL at 21:26

## 2018-05-04 RX ADMIN — Medication 1 CAP: at 12:38

## 2018-05-04 RX ADMIN — DICLOFENAC 1 GM: 10 GEL TOPICAL at 21:25

## 2018-05-04 RX ADMIN — Medication 1 CAP: at 09:59

## 2018-05-04 RX ADMIN — HYDROMORPHONE HYDROCHLORIDE 1 MG: 1 INJECTION, SOLUTION INTRAMUSCULAR; INTRAVENOUS; SUBCUTANEOUS at 17:57

## 2018-05-04 RX ADMIN — DICLOFENAC 1 GM: 10 GEL TOPICAL at 12:38

## 2018-05-04 RX ADMIN — FLUCONAZOLE 1 MG: 100 TABLET ORAL at 09:59

## 2018-05-04 RX ADMIN — NYSTATIN 1 APPLIC: 100000 POWDER TOPICAL at 10:02

## 2018-05-04 RX ADMIN — VITAMIN D, TAB 1000IU (100/BT) 1 UNIT: 25 TAB at 09:59

## 2018-05-04 RX ADMIN — HYDROCODONE BITARTRATE AND ACETAMINOPHEN 1 TAB: 5; 325 TABLET ORAL at 13:35

## 2018-05-04 RX ADMIN — INSULIN ASPART 1 UNIT: 100 INJECTION, SOLUTION INTRAVENOUS; SUBCUTANEOUS at 21:00

## 2018-05-04 RX ADMIN — INSULIN ASPART 1 UNIT: 100 INJECTION, SOLUTION INTRAVENOUS; SUBCUTANEOUS at 12:00

## 2018-05-04 RX ADMIN — PREGABALIN 1 MG: 75 CAPSULE ORAL at 09:59

## 2018-05-04 RX ADMIN — HEPARIN SODIUM 1 UNIT: 1000 INJECTION, SOLUTION INTRAVENOUS; SUBCUTANEOUS at 08:04

## 2018-05-04 RX ADMIN — ANALGESIC BALM 1 APPLIC: 1.74; 4.06 OINTMENT TOPICAL at 12:00

## 2018-05-04 RX ADMIN — DICLOFENAC 1 GM: 10 GEL TOPICAL at 17:00

## 2018-05-04 RX ADMIN — HYDROCODONE BITARTRATE AND ACETAMINOPHEN 1 TAB: 5; 325 TABLET ORAL at 16:41

## 2018-05-04 RX ADMIN — HYDROMORPHONE HYDROCHLORIDE 1 MG: 1 INJECTION, SOLUTION INTRAMUSCULAR; INTRAVENOUS; SUBCUTANEOUS at 02:00

## 2018-05-04 RX ADMIN — ONDANSETRON HYDROCHLORIDE 1 MG: 2 INJECTION, SOLUTION INTRAMUSCULAR; INTRAVENOUS at 08:12

## 2018-05-04 RX ADMIN — HYDROMORPHONE HYDROCHLORIDE 1 MG: 1 INJECTION, SOLUTION INTRAMUSCULAR; INTRAVENOUS; SUBCUTANEOUS at 23:40

## 2018-05-04 RX ADMIN — CASTOR OIL AND BALSAM, PERU 1 APPLIC: 788; 87 OINTMENT TOPICAL at 10:02

## 2018-05-04 RX ADMIN — HYDROMORPHONE HYDROCHLORIDE 1 MG: 1 INJECTION, SOLUTION INTRAMUSCULAR; INTRAVENOUS; SUBCUTANEOUS at 13:39

## 2018-05-05 RX ADMIN — NYSTATIN 1 APPLIC: 100000 POWDER TOPICAL at 17:28

## 2018-05-05 RX ADMIN — ACYCLOVIR 1 MG: 400 TABLET ORAL at 09:02

## 2018-05-05 RX ADMIN — PREGABALIN 1 MG: 75 CAPSULE ORAL at 09:53

## 2018-05-05 RX ADMIN — HYDROMORPHONE HYDROCHLORIDE 1 MG: 1 INJECTION, SOLUTION INTRAMUSCULAR; INTRAVENOUS; SUBCUTANEOUS at 08:59

## 2018-05-05 RX ADMIN — Medication 1 CAP: at 20:21

## 2018-05-05 RX ADMIN — INSULIN ASPART 1 UNIT: 100 INJECTION, SOLUTION INTRAVENOUS; SUBCUTANEOUS at 20:26

## 2018-05-05 RX ADMIN — DICLOFENAC 1 GM: 10 GEL TOPICAL at 17:00

## 2018-05-05 RX ADMIN — SODIUM CHLORIDE 1 MLS/HR: 9 INJECTION, SOLUTION INTRAVENOUS at 17:36

## 2018-05-05 RX ADMIN — Medication 1 CAP: at 08:58

## 2018-05-05 RX ADMIN — ANALGESIC BALM 1 APPLIC: 1.74; 4.06 OINTMENT TOPICAL at 06:00

## 2018-05-05 RX ADMIN — CASTOR OIL AND BALSAM, PERU 1 APPLIC: 788; 87 OINTMENT TOPICAL at 22:17

## 2018-05-05 RX ADMIN — INSULIN ASPART 1 UNIT: 100 INJECTION, SOLUTION INTRAVENOUS; SUBCUTANEOUS at 17:26

## 2018-05-05 RX ADMIN — DICLOFENAC 1 GM: 10 GEL TOPICAL at 12:12

## 2018-05-05 RX ADMIN — NYSTATIN 1 APPLIC: 100000 POWDER TOPICAL at 09:00

## 2018-05-05 RX ADMIN — HYDROMORPHONE HYDROCHLORIDE 1 MG: 1 INJECTION, SOLUTION INTRAMUSCULAR; INTRAVENOUS; SUBCUTANEOUS at 13:59

## 2018-05-05 RX ADMIN — INSULIN ASPART 1 UNIT: 100 INJECTION, SOLUTION INTRAVENOUS; SUBCUTANEOUS at 12:00

## 2018-05-05 RX ADMIN — HYDROMORPHONE HYDROCHLORIDE 1 MG: 1 INJECTION, SOLUTION INTRAMUSCULAR; INTRAVENOUS; SUBCUTANEOUS at 04:12

## 2018-05-05 RX ADMIN — CITALOPRAM HYDROBROMIDE 1 MG: 20 TABLET ORAL at 08:58

## 2018-05-05 RX ADMIN — VITAMIN D, TAB 1000IU (100/BT) 1 UNIT: 25 TAB at 09:53

## 2018-05-05 RX ADMIN — CEFEPIME 1 MLS/HR: 1 INJECTION, POWDER, FOR SOLUTION INTRAMUSCULAR; INTRAVENOUS at 12:11

## 2018-05-05 RX ADMIN — RIFAMPIN 1 MG: 300 CAPSULE ORAL at 09:02

## 2018-05-05 RX ADMIN — ACYCLOVIR 1 MG: 400 TABLET ORAL at 20:21

## 2018-05-05 RX ADMIN — HYDROCODONE BITARTRATE AND ACETAMINOPHEN 1 TAB: 5; 325 TABLET ORAL at 07:02

## 2018-05-05 RX ADMIN — PANTOPRAZOLE SODIUM 1 MG: 40 TABLET, DELAYED RELEASE ORAL at 07:02

## 2018-05-05 RX ADMIN — HYDROMORPHONE HYDROCHLORIDE 1 MG: 1 INJECTION, SOLUTION INTRAMUSCULAR; INTRAVENOUS; SUBCUTANEOUS at 18:10

## 2018-05-05 RX ADMIN — ANALGESIC BALM 1 APPLIC: 1.74; 4.06 OINTMENT TOPICAL at 17:29

## 2018-05-05 RX ADMIN — DICLOFENAC 1 GM: 10 GEL TOPICAL at 20:27

## 2018-05-05 RX ADMIN — NYSTATIN 1 APPLIC: 100000 POWDER TOPICAL at 22:16

## 2018-05-05 RX ADMIN — ANALGESIC BALM 1 APPLIC: 1.74; 4.06 OINTMENT TOPICAL at 12:00

## 2018-05-05 RX ADMIN — EPOETIN ALFA 1 UNITS: 3000 SOLUTION INTRAVENOUS; SUBCUTANEOUS at 17:39

## 2018-05-05 RX ADMIN — PREGABALIN 1 MG: 75 CAPSULE ORAL at 20:21

## 2018-05-05 RX ADMIN — INSULIN ASPART 1 UNIT: 100 INJECTION, SOLUTION INTRAVENOUS; SUBCUTANEOUS at 08:00

## 2018-05-05 RX ADMIN — Medication 1 CAP: at 12:29

## 2018-05-05 RX ADMIN — VITAMIN A AND D 1 APPLIC: 30.8 OINTMENT TOPICAL at 09:09

## 2018-05-05 RX ADMIN — MORPHINE SULFATE 1 MG: 15 TABLET, EXTENDED RELEASE ORAL at 20:21

## 2018-05-05 RX ADMIN — DICLOFENAC 1 GM: 10 GEL TOPICAL at 09:00

## 2018-05-05 RX ADMIN — CASTOR OIL AND BALSAM, PERU 1 APPLIC: 788; 87 OINTMENT TOPICAL at 09:00

## 2018-05-05 RX ADMIN — FLUCONAZOLE 1 MG: 100 TABLET ORAL at 08:58

## 2018-05-05 RX ADMIN — CASTOR OIL AND BALSAM, PERU 1 APPLIC: 788; 87 OINTMENT TOPICAL at 17:28

## 2018-05-06 RX ADMIN — ANALGESIC BALM 1 APPLIC: 1.74; 4.06 OINTMENT TOPICAL at 06:00

## 2018-05-06 RX ADMIN — INSULIN ASPART 1 UNIT: 100 INJECTION, SOLUTION INTRAVENOUS; SUBCUTANEOUS at 21:00

## 2018-05-06 RX ADMIN — ACYCLOVIR 1 MG: 400 TABLET ORAL at 21:29

## 2018-05-06 RX ADMIN — CASTOR OIL AND BALSAM, PERU 1 APPLIC: 788; 87 OINTMENT TOPICAL at 21:38

## 2018-05-06 RX ADMIN — NYSTATIN 1 APPLIC: 100000 POWDER TOPICAL at 21:37

## 2018-05-06 RX ADMIN — VITAMIN D, TAB 1000IU (100/BT) 1 UNIT: 25 TAB at 08:35

## 2018-05-06 RX ADMIN — DICLOFENAC 1 GM: 10 GEL TOPICAL at 12:33

## 2018-05-06 RX ADMIN — CITALOPRAM HYDROBROMIDE 1 MG: 20 TABLET ORAL at 08:35

## 2018-05-06 RX ADMIN — ANALGESIC BALM 1 APPLIC: 1.74; 4.06 OINTMENT TOPICAL at 17:33

## 2018-05-06 RX ADMIN — INSULIN ASPART 1 UNIT: 100 INJECTION, SOLUTION INTRAVENOUS; SUBCUTANEOUS at 12:00

## 2018-05-06 RX ADMIN — FLUCONAZOLE 1 MG: 100 TABLET ORAL at 08:36

## 2018-05-06 RX ADMIN — ACYCLOVIR 1 MG: 400 TABLET ORAL at 08:36

## 2018-05-06 RX ADMIN — Medication 1 CAP: at 21:29

## 2018-05-06 RX ADMIN — PREGABALIN 1 MG: 75 CAPSULE ORAL at 21:29

## 2018-05-06 RX ADMIN — Medication 1 CAP: at 08:35

## 2018-05-06 RX ADMIN — PANTOPRAZOLE SODIUM 1 MG: 40 TABLET, DELAYED RELEASE ORAL at 07:50

## 2018-05-06 RX ADMIN — CASTOR OIL AND BALSAM, PERU 1 APPLIC: 788; 87 OINTMENT TOPICAL at 08:37

## 2018-05-06 RX ADMIN — NYSTATIN 1 APPLIC: 100000 POWDER TOPICAL at 08:37

## 2018-05-06 RX ADMIN — PANTOPRAZOLE SODIUM 1 MG: 40 TABLET, DELAYED RELEASE ORAL at 08:35

## 2018-05-06 RX ADMIN — HYDROMORPHONE HYDROCHLORIDE 1 MG: 1 INJECTION, SOLUTION INTRAMUSCULAR; INTRAVENOUS; SUBCUTANEOUS at 02:26

## 2018-05-06 RX ADMIN — ANALGESIC BALM 1 APPLIC: 1.74; 4.06 OINTMENT TOPICAL at 12:00

## 2018-05-06 RX ADMIN — ANALGESIC BALM 1 APPLIC: 1.74; 4.06 OINTMENT TOPICAL at 00:00

## 2018-05-06 RX ADMIN — PREGABALIN 1 MG: 75 CAPSULE ORAL at 08:36

## 2018-05-06 RX ADMIN — RIFAMPIN 1 MG: 300 CAPSULE ORAL at 08:36

## 2018-05-06 RX ADMIN — MORPHINE SULFATE 1 MG: 15 TABLET, EXTENDED RELEASE ORAL at 08:37

## 2018-05-06 RX ADMIN — DICLOFENAC 1 GM: 10 GEL TOPICAL at 21:37

## 2018-05-06 RX ADMIN — HYDROMORPHONE HYDROCHLORIDE 1 MG: 1 INJECTION, SOLUTION INTRAMUSCULAR; INTRAVENOUS; SUBCUTANEOUS at 14:38

## 2018-05-06 RX ADMIN — INSULIN ASPART 1 UNIT: 100 INJECTION, SOLUTION INTRAVENOUS; SUBCUTANEOUS at 08:00

## 2018-05-06 RX ADMIN — DICLOFENAC 1 GM: 10 GEL TOPICAL at 08:37

## 2018-05-06 RX ADMIN — MORPHINE SULFATE 1 MG: 15 TABLET, EXTENDED RELEASE ORAL at 21:30

## 2018-05-06 RX ADMIN — Medication 1 CAP: at 12:32

## 2018-05-06 RX ADMIN — DICLOFENAC 1 GM: 10 GEL TOPICAL at 17:35

## 2018-05-06 RX ADMIN — VITAMIN A AND D 1 APPLIC: 30.8 OINTMENT TOPICAL at 08:35

## 2018-05-06 RX ADMIN — HYDROCODONE BITARTRATE AND ACETAMINOPHEN 1 TAB: 10; 325 TABLET ORAL at 01:10

## 2018-05-06 RX ADMIN — CEFEPIME 1 MLS/HR: 1 INJECTION, POWDER, FOR SOLUTION INTRAMUSCULAR; INTRAVENOUS at 12:29

## 2018-05-06 RX ADMIN — INSULIN ASPART 1 UNIT: 100 INJECTION, SOLUTION INTRAVENOUS; SUBCUTANEOUS at 17:33

## 2018-05-07 LAB
ABNORMAL IP MESSAGE: 1
ADD MAN DIFF?: NO
ALANINE AMINOTRANSFERASE: 13 IU/L (ref 13–69)
ALBUMIN/GLOBULIN RATIO: 0.7
ALBUMIN: 2.4 G/DL (ref 3.3–4.9)
ALKALINE PHOSPHATASE: 117 IU/L (ref 42–121)
ANION GAP: 15 (ref 8–16)
ASPARTATE AMINO TRANSFERASE: 11 IU/L (ref 15–46)
BASOPHIL #: 0 10^3/UL (ref 0–0.1)
BASOPHILS %: 0.8 % (ref 0–2)
BILIRUBIN,DIRECT: 0.3 MG/DL (ref 0–0.2)
BILIRUBIN,TOTAL: 0.3 MG/DL (ref 0.2–1.3)
BLOOD UREA NITROGEN: 24 MG/DL (ref 7–20)
CALCIUM: 8.4 MG/DL (ref 8.4–10.2)
CARBON DIOXIDE: 24 MMOL/L (ref 21–31)
CHLORIDE: 101 MMOL/L (ref 97–110)
CREATININE: 4.3 MG/DL (ref 0.44–1)
EOSINOPHILS #: 0.1 10^3/UL (ref 0–0.5)
EOSINOPHILS %: 1.6 % (ref 0–7)
GLOBULIN: 3.4 G/DL (ref 1.3–3.2)
GLUCOSE: 71 MG/DL (ref 70–220)
HEMATOCRIT: 25.2 % (ref 37–47)
HEMOGLOBIN: 8 G/DL (ref 12–16)
HEPATITIS B SURFACE ANTIGEN: NEGATIVE
LYMPHOCYTES #: 0.7 10^3/UL (ref 0.8–2.9)
LYMPHOCYTES %: 18.3 % (ref 15–51)
MAGNESIUM: 2 MG/DL (ref 1.7–2.5)
MEAN CORPUSCULAR HEMOGLOBIN: 34.3 PG (ref 29–33)
MEAN CORPUSCULAR HGB CONC: 31.7 G/DL (ref 32–37)
MEAN CORPUSCULAR VOLUME: 108.2 FL (ref 82–101)
MEAN PLATELET VOLUME: 10 FL (ref 7.4–10.4)
MONOCYTE #: 0.4 10^3/UL (ref 0.3–0.9)
MONOCYTES %: 10.8 % (ref 0–11)
NEUTROPHIL #: 2.5 10^3/UL (ref 1.6–7.5)
NEUTROPHILS %: 66.3 % (ref 39–77)
NUCLEATED RED BLOOD CELLS #: 0 10^3/UL (ref 0–0)
NUCLEATED RED BLOOD CELLS%: 0 /100WBC (ref 0–0)
PHOSPHORUS: 5.5 MG/DL (ref 2.5–4.9)
PLATELET COUNT: 230 10^3/UL (ref 140–415)
POSITIVE DIFF: (no result)
POTASSIUM: 4.8 MMOL/L (ref 3.5–5.1)
RED BLOOD COUNT: 2.33 10^6/UL (ref 4.2–5.4)
RED CELL DISTRIBUTION WIDTH: 24.1 % (ref 11.5–14.5)
SODIUM: 135 MMOL/L (ref 135–144)
TOTAL PROTEIN: 5.8 G/DL (ref 6.1–8.1)
WHITE BLOOD COUNT: 3.7 10^3/UL (ref 4.8–10.8)

## 2018-05-07 RX ADMIN — CASTOR OIL AND BALSAM, PERU 1 APPLIC: 788; 87 OINTMENT TOPICAL at 21:00

## 2018-05-07 RX ADMIN — Medication 1 CAP: at 21:32

## 2018-05-07 RX ADMIN — LOPERAMIDE HYDROCHLORIDE 1 MG: 2 CAPSULE ORAL at 14:38

## 2018-05-07 RX ADMIN — CEFEPIME 1 MLS/HR: 1 INJECTION, POWDER, FOR SOLUTION INTRAMUSCULAR; INTRAVENOUS at 14:37

## 2018-05-07 RX ADMIN — NYSTATIN 1 APPLIC: 100000 POWDER TOPICAL at 13:19

## 2018-05-07 RX ADMIN — PREGABALIN 1 MG: 75 CAPSULE ORAL at 21:32

## 2018-05-07 RX ADMIN — LOPERAMIDE HYDROCHLORIDE 1 MG: 2 CAPSULE ORAL at 21:32

## 2018-05-07 RX ADMIN — Medication 1 CAP: at 13:17

## 2018-05-07 RX ADMIN — DICLOFENAC 1 GM: 10 GEL TOPICAL at 13:00

## 2018-05-07 RX ADMIN — ANALGESIC BALM 1 APPLIC: 1.74; 4.06 OINTMENT TOPICAL at 12:00

## 2018-05-07 RX ADMIN — VITAMIN D, TAB 1000IU (100/BT) 1 UNIT: 25 TAB at 13:16

## 2018-05-07 RX ADMIN — ACYCLOVIR 1 MG: 400 TABLET ORAL at 21:32

## 2018-05-07 RX ADMIN — CITALOPRAM HYDROBROMIDE 1 MG: 20 TABLET ORAL at 13:16

## 2018-05-07 RX ADMIN — MORPHINE SULFATE 1 MG: 15 TABLET, EXTENDED RELEASE ORAL at 09:00

## 2018-05-07 RX ADMIN — NYSTATIN 1 APPLIC: 100000 POWDER TOPICAL at 21:34

## 2018-05-07 RX ADMIN — SODIUM CHLORIDE 1 MLS/HR: 9 INJECTION, SOLUTION INTRAVENOUS at 17:33

## 2018-05-07 RX ADMIN — PANTOPRAZOLE SODIUM 1 MG: 40 TABLET, DELAYED RELEASE ORAL at 06:27

## 2018-05-07 RX ADMIN — INSULIN ASPART 1 UNIT: 100 INJECTION, SOLUTION INTRAVENOUS; SUBCUTANEOUS at 21:00

## 2018-05-07 RX ADMIN — DICLOFENAC 1 GM: 10 GEL TOPICAL at 09:00

## 2018-05-07 RX ADMIN — INSULIN ASPART 1 UNIT: 100 INJECTION, SOLUTION INTRAVENOUS; SUBCUTANEOUS at 08:00

## 2018-05-07 RX ADMIN — ANALGESIC BALM 1 APPLIC: 1.74; 4.06 OINTMENT TOPICAL at 06:00

## 2018-05-07 RX ADMIN — DICLOFENAC 1 GM: 10 GEL TOPICAL at 21:00

## 2018-05-07 RX ADMIN — PREGABALIN 1 MG: 75 CAPSULE ORAL at 13:15

## 2018-05-07 RX ADMIN — INSULIN ASPART 1 UNIT: 100 INJECTION, SOLUTION INTRAVENOUS; SUBCUTANEOUS at 17:36

## 2018-05-07 RX ADMIN — ACYCLOVIR 1 MG: 400 TABLET ORAL at 13:16

## 2018-05-07 RX ADMIN — FLUCONAZOLE 1 MG: 100 TABLET ORAL at 13:16

## 2018-05-07 RX ADMIN — MORPHINE SULFATE 1 MG: 15 TABLET, EXTENDED RELEASE ORAL at 21:00

## 2018-05-07 RX ADMIN — Medication 1 CAP: at 10:31

## 2018-05-07 RX ADMIN — VITAMIN A AND D 1 APPLIC: 30.8 OINTMENT TOPICAL at 13:17

## 2018-05-07 RX ADMIN — INSULIN ASPART 1 UNIT: 100 INJECTION, SOLUTION INTRAVENOUS; SUBCUTANEOUS at 12:00

## 2018-05-07 RX ADMIN — MORPHINE SULFATE 1 MG: 15 TABLET, EXTENDED RELEASE ORAL at 13:16

## 2018-05-07 RX ADMIN — ANALGESIC BALM 1 APPLIC: 1.74; 4.06 OINTMENT TOPICAL at 17:35

## 2018-05-07 RX ADMIN — ANALGESIC BALM 1 APPLIC: 1.74; 4.06 OINTMENT TOPICAL at 00:00

## 2018-05-07 RX ADMIN — DICLOFENAC 1 GM: 10 GEL TOPICAL at 17:00

## 2018-05-07 RX ADMIN — CASTOR OIL AND BALSAM, PERU 1 APPLIC: 788; 87 OINTMENT TOPICAL at 13:19

## 2018-05-07 RX ADMIN — RIFAMPIN 1 MG: 300 CAPSULE ORAL at 13:16

## 2018-05-08 LAB
ABNORMAL IP MESSAGE: 1
ADD MAN DIFF?: NO
ANION GAP: 10 (ref 8–16)
BASOPHIL #: 0 10^3/UL (ref 0–0.1)
BASOPHILS %: 0.9 % (ref 0–2)
BLOOD UREA NITROGEN: 17 MG/DL (ref 7–20)
CALCIUM: 8.6 MG/DL (ref 8.4–10.2)
CARBON DIOXIDE: 31 MMOL/L (ref 21–31)
CHLORIDE: 104 MMOL/L (ref 97–110)
CREATINE KINASE: < 20 IU/L (ref 23–200)
CREATININE: 2.86 MG/DL (ref 0.44–1)
EOSINOPHILS #: 0 10^3/UL (ref 0–0.5)
EOSINOPHILS %: 0.9 % (ref 0–7)
GLUCOSE: 80 MG/DL (ref 70–220)
HEMATOCRIT: 25.1 % (ref 37–47)
HEMOGLOBIN: 8 G/DL (ref 12–16)
LYMPHOCYTES #: 0.8 10^3/UL (ref 0.8–2.9)
LYMPHOCYTES %: 22.2 % (ref 15–51)
MAGNESIUM: 1.9 MG/DL (ref 1.7–2.5)
MEAN CORPUSCULAR HEMOGLOBIN: 35.1 PG (ref 29–33)
MEAN CORPUSCULAR HGB CONC: 31.9 G/DL (ref 32–37)
MEAN CORPUSCULAR VOLUME: 110.1 FL (ref 82–101)
MEAN PLATELET VOLUME: 10.1 FL (ref 7.4–10.4)
MONOCYTE #: 0.4 10^3/UL (ref 0.3–0.9)
MONOCYTES %: 11.8 % (ref 0–11)
NEUTROPHIL #: 2.1 10^3/UL (ref 1.6–7.5)
NEUTROPHILS %: 62.7 % (ref 39–77)
NUCLEATED RED BLOOD CELLS #: 0 10^3/UL (ref 0–0)
NUCLEATED RED BLOOD CELLS%: 0 /100WBC (ref 0–0)
PHOSPHORUS: 4.1 MG/DL (ref 2.5–4.9)
PLATELET COUNT: 237 10^3/UL (ref 140–415)
POSITIVE DIFF: (no result)
POTASSIUM: 5.1 MMOL/L (ref 3.5–5.1)
RED BLOOD COUNT: 2.28 10^6/UL (ref 4.2–5.4)
RED CELL DISTRIBUTION WIDTH: 24.3 % (ref 11.5–14.5)
SODIUM: 140 MMOL/L (ref 135–144)
WHITE BLOOD COUNT: 3.4 10^3/UL (ref 4.8–10.8)

## 2018-05-08 RX ADMIN — DICLOFENAC 1 GM: 10 GEL TOPICAL at 16:55

## 2018-05-08 RX ADMIN — MORPHINE SULFATE 1 MG: 15 TABLET, EXTENDED RELEASE ORAL at 21:00

## 2018-05-08 RX ADMIN — CASTOR OIL AND BALSAM, PERU 1 APPLIC: 788; 87 OINTMENT TOPICAL at 06:13

## 2018-05-08 RX ADMIN — Medication 1 CAP: at 09:07

## 2018-05-08 RX ADMIN — Medication 1 CAP: at 22:13

## 2018-05-08 RX ADMIN — VITAMIN A AND D 1 APPLIC: 30.8 OINTMENT TOPICAL at 09:07

## 2018-05-08 RX ADMIN — PANTOPRAZOLE SODIUM 1 MG: 40 TABLET, DELAYED RELEASE ORAL at 06:13

## 2018-05-08 RX ADMIN — ACYCLOVIR 1 MG: 400 TABLET ORAL at 22:13

## 2018-05-08 RX ADMIN — FLUCONAZOLE 1 MG: 100 TABLET ORAL at 09:08

## 2018-05-08 RX ADMIN — ACYCLOVIR 1 MG: 400 TABLET ORAL at 09:07

## 2018-05-08 RX ADMIN — PREGABALIN 1 MG: 75 CAPSULE ORAL at 09:08

## 2018-05-08 RX ADMIN — INSULIN ASPART 1 UNIT: 100 INJECTION, SOLUTION INTRAVENOUS; SUBCUTANEOUS at 21:00

## 2018-05-08 RX ADMIN — CITALOPRAM HYDROBROMIDE 1 MG: 20 TABLET ORAL at 09:07

## 2018-05-08 RX ADMIN — INSULIN ASPART 1 UNIT: 100 INJECTION, SOLUTION INTRAVENOUS; SUBCUTANEOUS at 12:00

## 2018-05-08 RX ADMIN — CITALOPRAM HYDROBROMIDE 1 MG: 20 TABLET ORAL at 09:00

## 2018-05-08 RX ADMIN — VITAMIN D, TAB 1000IU (100/BT) 1 UNIT: 25 TAB at 09:08

## 2018-05-08 RX ADMIN — INSULIN ASPART 1 UNIT: 100 INJECTION, SOLUTION INTRAVENOUS; SUBCUTANEOUS at 18:05

## 2018-05-08 RX ADMIN — ANALGESIC BALM 1 APPLIC: 1.74; 4.06 OINTMENT TOPICAL at 00:00

## 2018-05-08 RX ADMIN — Medication 1 CAP: at 12:22

## 2018-05-08 RX ADMIN — DICLOFENAC 1 GM: 10 GEL TOPICAL at 12:21

## 2018-05-08 RX ADMIN — LOPERAMIDE HYDROCHLORIDE 1 MG: 2 CAPSULE ORAL at 09:07

## 2018-05-08 RX ADMIN — CASTOR OIL AND BALSAM, PERU 1 APPLIC: 788; 87 OINTMENT TOPICAL at 22:14

## 2018-05-08 RX ADMIN — CASTOR OIL AND BALSAM, PERU 1 APPLIC: 788; 87 OINTMENT TOPICAL at 09:10

## 2018-05-08 RX ADMIN — ANALGESIC BALM 1 APPLIC: 1.74; 4.06 OINTMENT TOPICAL at 06:00

## 2018-05-08 RX ADMIN — NYSTATIN 1 APPLIC: 100000 POWDER TOPICAL at 22:14

## 2018-05-08 RX ADMIN — INSULIN ASPART 1 UNIT: 100 INJECTION, SOLUTION INTRAVENOUS; SUBCUTANEOUS at 08:00

## 2018-05-08 RX ADMIN — ANALGESIC BALM 1 APPLIC: 1.74; 4.06 OINTMENT TOPICAL at 12:00

## 2018-05-08 RX ADMIN — NYSTATIN 1 APPLIC: 100000 POWDER TOPICAL at 09:09

## 2018-05-08 RX ADMIN — CEFEPIME 1 MLS/HR: 1 INJECTION, POWDER, FOR SOLUTION INTRAMUSCULAR; INTRAVENOUS at 12:22

## 2018-05-08 RX ADMIN — DICLOFENAC 1 GM: 10 GEL TOPICAL at 21:00

## 2018-05-08 RX ADMIN — MORPHINE SULFATE 1 MG: 15 TABLET, EXTENDED RELEASE ORAL at 09:00

## 2018-05-08 RX ADMIN — LOPERAMIDE HYDROCHLORIDE 1 MG: 2 CAPSULE ORAL at 22:26

## 2018-05-08 RX ADMIN — PREGABALIN 1 MG: 75 CAPSULE ORAL at 22:13

## 2018-05-08 RX ADMIN — DICLOFENAC 1 GM: 10 GEL TOPICAL at 09:00

## 2018-05-08 RX ADMIN — EPOETIN ALFA 1 UNITS: 3000 SOLUTION INTRAVENOUS; SUBCUTANEOUS at 17:18

## 2018-05-08 RX ADMIN — ANALGESIC BALM 1 APPLIC: 1.74; 4.06 OINTMENT TOPICAL at 18:00

## 2018-05-08 RX ADMIN — RIFAMPIN 1 MG: 300 CAPSULE ORAL at 09:07

## 2018-05-09 LAB
ABNORMAL IP MESSAGE: 1
ADD MAN DIFF?: NO
ANION GAP: 9 (ref 8–16)
BASOPHIL #: 0 10^3/UL (ref 0–0.1)
BASOPHILS %: 1.1 % (ref 0–2)
BLOOD UREA NITROGEN: 20 MG/DL (ref 7–20)
CALCIUM: 8.4 MG/DL (ref 8.4–10.2)
CARBON DIOXIDE: 30 MMOL/L (ref 21–31)
CHLORIDE: 104 MMOL/L (ref 97–110)
CREATININE: 3.18 MG/DL (ref 0.44–1)
EOSINOPHILS #: 0 10^3/UL (ref 0–0.5)
EOSINOPHILS %: 1.1 % (ref 0–7)
GLUCOSE: 83 MG/DL (ref 70–220)
HEMATOCRIT: 24.2 % (ref 37–47)
HEMOGLOBIN: 7.7 G/DL (ref 12–16)
LYMPHOCYTES #: 0.8 10^3/UL (ref 0.8–2.9)
LYMPHOCYTES %: 20.2 % (ref 15–51)
MAGNESIUM: 2 MG/DL (ref 1.7–2.5)
MEAN CORPUSCULAR HEMOGLOBIN: 34.4 PG (ref 29–33)
MEAN CORPUSCULAR HGB CONC: 31.8 G/DL (ref 32–37)
MEAN CORPUSCULAR VOLUME: 108 FL (ref 82–101)
MEAN PLATELET VOLUME: 9.7 FL (ref 7.4–10.4)
MONOCYTE #: 0.4 10^3/UL (ref 0.3–0.9)
MONOCYTES %: 10.5 % (ref 0–11)
NEUTROPHIL #: 2.4 10^3/UL (ref 1.6–7.5)
NEUTROPHILS %: 65.8 % (ref 39–77)
NUCLEATED RED BLOOD CELLS #: 0 10^3/UL (ref 0–0)
NUCLEATED RED BLOOD CELLS%: 0 /100WBC (ref 0–0)
PHOSPHORUS: 4.3 MG/DL (ref 2.5–4.9)
PLATELET COUNT: 186 10^3/UL (ref 140–415)
POSITIVE DIFF: (no result)
POTASSIUM: 4.7 MMOL/L (ref 3.5–5.1)
RED BLOOD COUNT: 2.24 10^6/UL (ref 4.2–5.4)
RED CELL DISTRIBUTION WIDTH: 24 % (ref 11.5–14.5)
SODIUM: 138 MMOL/L (ref 135–144)
WHITE BLOOD COUNT: 3.7 10^3/UL (ref 4.8–10.8)

## 2018-05-09 RX ADMIN — HYDROMORPHONE HYDROCHLORIDE 1 MG: 1 INJECTION, SOLUTION INTRAMUSCULAR; INTRAVENOUS; SUBCUTANEOUS at 21:23

## 2018-05-09 RX ADMIN — INSULIN ASPART 1 UNIT: 100 INJECTION, SOLUTION INTRAVENOUS; SUBCUTANEOUS at 08:00

## 2018-05-09 RX ADMIN — RIFAMPIN 1 MG: 300 CAPSULE ORAL at 09:16

## 2018-05-09 RX ADMIN — SODIUM CHLORIDE 1 MLS/HR: 9 INJECTION, SOLUTION INTRAVENOUS at 18:36

## 2018-05-09 RX ADMIN — ANALGESIC BALM 1 APPLIC: 1.74; 4.06 OINTMENT TOPICAL at 17:30

## 2018-05-09 RX ADMIN — ANALGESIC BALM 1 APPLIC: 1.74; 4.06 OINTMENT TOPICAL at 06:00

## 2018-05-09 RX ADMIN — DICLOFENAC 1 GM: 10 GEL TOPICAL at 09:00

## 2018-05-09 RX ADMIN — INSULIN ASPART 1 UNIT: 100 INJECTION, SOLUTION INTRAVENOUS; SUBCUTANEOUS at 17:30

## 2018-05-09 RX ADMIN — Medication 1 CAP: at 09:15

## 2018-05-09 RX ADMIN — DICLOFENAC 1 GM: 10 GEL TOPICAL at 21:15

## 2018-05-09 RX ADMIN — ACYCLOVIR 1 MG: 400 TABLET ORAL at 21:12

## 2018-05-09 RX ADMIN — VITAMIN A AND D 1 APPLIC: 30.8 OINTMENT TOPICAL at 09:15

## 2018-05-09 RX ADMIN — PANTOPRAZOLE SODIUM 1 MG: 40 TABLET, DELAYED RELEASE ORAL at 06:03

## 2018-05-09 RX ADMIN — VITAMIN D, TAB 1000IU (100/BT) 1 UNIT: 25 TAB at 09:15

## 2018-05-09 RX ADMIN — NYSTATIN 1 APPLIC: 100000 POWDER TOPICAL at 09:00

## 2018-05-09 RX ADMIN — ANALGESIC BALM 1 APPLIC: 1.74; 4.06 OINTMENT TOPICAL at 12:00

## 2018-05-09 RX ADMIN — NYSTATIN 1 APPLIC: 100000 POWDER TOPICAL at 21:12

## 2018-05-09 RX ADMIN — DICLOFENAC 1 GM: 10 GEL TOPICAL at 12:09

## 2018-05-09 RX ADMIN — ANALGESIC BALM 1 APPLIC: 1.74; 4.06 OINTMENT TOPICAL at 00:00

## 2018-05-09 RX ADMIN — INSULIN ASPART 1 UNIT: 100 INJECTION, SOLUTION INTRAVENOUS; SUBCUTANEOUS at 12:00

## 2018-05-09 RX ADMIN — ACYCLOVIR 1 MG: 400 TABLET ORAL at 09:16

## 2018-05-09 RX ADMIN — Medication 1 CAP: at 13:18

## 2018-05-09 RX ADMIN — PREGABALIN 1 MG: 75 CAPSULE ORAL at 21:12

## 2018-05-09 RX ADMIN — INSULIN ASPART 1 UNIT: 100 INJECTION, SOLUTION INTRAVENOUS; SUBCUTANEOUS at 21:00

## 2018-05-09 RX ADMIN — MORPHINE SULFATE 1 MG: 15 TABLET, EXTENDED RELEASE ORAL at 09:00

## 2018-05-09 RX ADMIN — PREGABALIN 1 MG: 75 CAPSULE ORAL at 09:16

## 2018-05-09 RX ADMIN — MORPHINE SULFATE 1 MG: 15 TABLET, EXTENDED RELEASE ORAL at 21:00

## 2018-05-09 RX ADMIN — CASTOR OIL AND BALSAM, PERU 1 APPLIC: 788; 87 OINTMENT TOPICAL at 09:24

## 2018-05-09 RX ADMIN — DICLOFENAC 1 GM: 10 GEL TOPICAL at 17:00

## 2018-05-09 RX ADMIN — CITALOPRAM HYDROBROMIDE 1 MG: 20 TABLET ORAL at 09:00

## 2018-05-09 RX ADMIN — FLUCONAZOLE 1 MG: 100 TABLET ORAL at 09:15

## 2018-05-09 RX ADMIN — Medication 1 CAP: at 21:12

## 2018-05-09 RX ADMIN — CEFEPIME 1 MLS/HR: 1 INJECTION, POWDER, FOR SOLUTION INTRAMUSCULAR; INTRAVENOUS at 13:18

## 2018-05-10 RX ADMIN — ANALGESIC BALM 1 APPLIC: 1.74; 4.06 OINTMENT TOPICAL at 00:00

## 2018-05-10 RX ADMIN — ANALGESIC BALM 1 APPLIC: 1.74; 4.06 OINTMENT TOPICAL at 12:00

## 2018-05-10 RX ADMIN — ANALGESIC BALM 1 APPLIC: 1.74; 4.06 OINTMENT TOPICAL at 17:26

## 2018-05-10 RX ADMIN — PANTOPRAZOLE SODIUM 1 MG: 40 TABLET, DELAYED RELEASE ORAL at 05:37

## 2018-05-10 RX ADMIN — PREGABALIN 1 MG: 75 CAPSULE ORAL at 08:48

## 2018-05-10 RX ADMIN — HYDROMORPHONE HYDROCHLORIDE 1 MG: 1 INJECTION, SOLUTION INTRAMUSCULAR; INTRAVENOUS; SUBCUTANEOUS at 12:08

## 2018-05-10 RX ADMIN — RIFAMPIN 1 MG: 300 CAPSULE ORAL at 08:48

## 2018-05-10 RX ADMIN — NYSTATIN 1 APPLIC: 100000 POWDER TOPICAL at 08:55

## 2018-05-10 RX ADMIN — ACYCLOVIR 1 MG: 400 TABLET ORAL at 20:47

## 2018-05-10 RX ADMIN — PREGABALIN 1 MG: 75 CAPSULE ORAL at 20:46

## 2018-05-10 RX ADMIN — DICLOFENAC 1 GM: 10 GEL TOPICAL at 08:56

## 2018-05-10 RX ADMIN — Medication 1 CAP: at 20:46

## 2018-05-10 RX ADMIN — ACYCLOVIR 1 MG: 400 TABLET ORAL at 08:48

## 2018-05-10 RX ADMIN — CEFEPIME 1 MLS/HR: 1 INJECTION, POWDER, FOR SOLUTION INTRAMUSCULAR; INTRAVENOUS at 12:07

## 2018-05-10 RX ADMIN — HYDRALAZINE HYDROCHLORIDE 1 MG: 20 INJECTION INTRAMUSCULAR; INTRAVENOUS at 20:48

## 2018-05-10 RX ADMIN — DICLOFENAC 1 GM: 10 GEL TOPICAL at 17:00

## 2018-05-10 RX ADMIN — FLUCONAZOLE 1 MG: 100 TABLET ORAL at 08:48

## 2018-05-10 RX ADMIN — Medication 1 CAP: at 08:48

## 2018-05-10 RX ADMIN — INSULIN ASPART 1 UNIT: 100 INJECTION, SOLUTION INTRAVENOUS; SUBCUTANEOUS at 17:26

## 2018-05-10 RX ADMIN — NYSTATIN 1 APPLIC: 100000 POWDER TOPICAL at 20:46

## 2018-05-10 RX ADMIN — INSULIN ASPART 1 UNIT: 100 INJECTION, SOLUTION INTRAVENOUS; SUBCUTANEOUS at 12:00

## 2018-05-10 RX ADMIN — ANALGESIC BALM 1 APPLIC: 1.74; 4.06 OINTMENT TOPICAL at 05:37

## 2018-05-10 RX ADMIN — CITALOPRAM HYDROBROMIDE 1 MG: 20 TABLET ORAL at 08:56

## 2018-05-10 RX ADMIN — INSULIN ASPART 1 UNIT: 100 INJECTION, SOLUTION INTRAVENOUS; SUBCUTANEOUS at 20:49

## 2018-05-10 RX ADMIN — HYDROMORPHONE HYDROCHLORIDE 1 MG: 1 INJECTION, SOLUTION INTRAMUSCULAR; INTRAVENOUS; SUBCUTANEOUS at 20:48

## 2018-05-10 RX ADMIN — DICLOFENAC 1 GM: 10 GEL TOPICAL at 12:01

## 2018-05-10 RX ADMIN — Medication 1 CAP: at 12:07

## 2018-05-10 RX ADMIN — INSULIN ASPART 1 UNIT: 100 INJECTION, SOLUTION INTRAVENOUS; SUBCUTANEOUS at 08:00

## 2018-05-10 RX ADMIN — MORPHINE SULFATE 1 MG: 15 TABLET, EXTENDED RELEASE ORAL at 21:00

## 2018-05-10 RX ADMIN — HYDROMORPHONE HYDROCHLORIDE 1 MG: 1 INJECTION, SOLUTION INTRAMUSCULAR; INTRAVENOUS; SUBCUTANEOUS at 05:38

## 2018-05-10 RX ADMIN — MORPHINE SULFATE 1 MG: 15 TABLET, EXTENDED RELEASE ORAL at 08:56

## 2018-05-10 RX ADMIN — VITAMIN A AND D 1 APPLIC: 30.8 OINTMENT TOPICAL at 08:48

## 2018-05-10 RX ADMIN — EPOETIN ALFA 1 UNITS: 3000 SOLUTION INTRAVENOUS; SUBCUTANEOUS at 17:28

## 2018-05-10 RX ADMIN — VITAMIN D, TAB 1000IU (100/BT) 1 UNIT: 25 TAB at 08:48

## 2018-05-11 RX ADMIN — PREGABALIN 1 MG: 75 CAPSULE ORAL at 09:17

## 2018-05-11 RX ADMIN — HYDROMORPHONE HYDROCHLORIDE 1 MG: 1 INJECTION, SOLUTION INTRAMUSCULAR; INTRAVENOUS; SUBCUTANEOUS at 03:05

## 2018-05-11 RX ADMIN — DICLOFENAC 1 GM: 10 GEL TOPICAL at 12:32

## 2018-05-11 RX ADMIN — SODIUM CHLORIDE 1 MLS/HR: 9 INJECTION, SOLUTION INTRAVENOUS at 17:33

## 2018-05-11 RX ADMIN — VITAMIN A AND D 1 APPLIC: 30.8 OINTMENT TOPICAL at 09:18

## 2018-05-11 RX ADMIN — INSULIN ASPART 1 UNIT: 100 INJECTION, SOLUTION INTRAVENOUS; SUBCUTANEOUS at 20:42

## 2018-05-11 RX ADMIN — HYDROMORPHONE HYDROCHLORIDE 1 MG: 1 INJECTION, SOLUTION INTRAMUSCULAR; INTRAVENOUS; SUBCUTANEOUS at 23:45

## 2018-05-11 RX ADMIN — FLUCONAZOLE 1 MG: 100 TABLET ORAL at 09:17

## 2018-05-11 RX ADMIN — ANALGESIC BALM 1 APPLIC: 1.74; 4.06 OINTMENT TOPICAL at 23:47

## 2018-05-11 RX ADMIN — DICLOFENAC 1 GM: 10 GEL TOPICAL at 20:38

## 2018-05-11 RX ADMIN — ANALGESIC BALM 1 APPLIC: 1.74; 4.06 OINTMENT TOPICAL at 06:32

## 2018-05-11 RX ADMIN — RIFAMPIN 1 MG: 300 CAPSULE ORAL at 09:17

## 2018-05-11 RX ADMIN — MORPHINE SULFATE 1 MG: 15 TABLET, EXTENDED RELEASE ORAL at 20:37

## 2018-05-11 RX ADMIN — CITALOPRAM HYDROBROMIDE 1 MG: 20 TABLET ORAL at 09:17

## 2018-05-11 RX ADMIN — ANALGESIC BALM 1 APPLIC: 1.74; 4.06 OINTMENT TOPICAL at 02:18

## 2018-05-11 RX ADMIN — Medication 1 CAP: at 20:37

## 2018-05-11 RX ADMIN — ANALGESIC BALM 1 APPLIC: 1.74; 4.06 OINTMENT TOPICAL at 12:32

## 2018-05-11 RX ADMIN — PANTOPRAZOLE SODIUM 1 MG: 40 TABLET, DELAYED RELEASE ORAL at 05:47

## 2018-05-11 RX ADMIN — Medication 1 CAP: at 09:17

## 2018-05-11 RX ADMIN — VITAMIN D, TAB 1000IU (100/BT) 1 UNIT: 25 TAB at 09:17

## 2018-05-11 RX ADMIN — AMLODIPINE BESYLATE 1 MG: 5 TABLET ORAL at 11:30

## 2018-05-11 RX ADMIN — DICLOFENAC 1 GM: 10 GEL TOPICAL at 00:05

## 2018-05-11 RX ADMIN — HYDROMORPHONE HYDROCHLORIDE 1 MG: 1 INJECTION, SOLUTION INTRAMUSCULAR; INTRAVENOUS; SUBCUTANEOUS at 18:27

## 2018-05-11 RX ADMIN — Medication 1 CAP: at 12:31

## 2018-05-11 RX ADMIN — NYSTATIN 1 APPLIC: 100000 POWDER TOPICAL at 09:19

## 2018-05-11 RX ADMIN — PREGABALIN 1 MG: 75 CAPSULE ORAL at 20:37

## 2018-05-11 RX ADMIN — NYSTATIN 1 APPLIC: 100000 POWDER TOPICAL at 20:38

## 2018-05-11 RX ADMIN — DICLOFENAC 1 GM: 10 GEL TOPICAL at 17:15

## 2018-05-11 RX ADMIN — HYDROCODONE BITARTRATE AND ACETAMINOPHEN 1 TAB: 10; 325 TABLET ORAL at 17:14

## 2018-05-11 RX ADMIN — ANALGESIC BALM 1 APPLIC: 1.74; 4.06 OINTMENT TOPICAL at 17:15

## 2018-05-11 RX ADMIN — INSULIN ASPART 1 UNIT: 100 INJECTION, SOLUTION INTRAVENOUS; SUBCUTANEOUS at 08:00

## 2018-05-11 RX ADMIN — INSULIN ASPART 1 UNIT: 100 INJECTION, SOLUTION INTRAVENOUS; SUBCUTANEOUS at 12:00

## 2018-05-11 RX ADMIN — ACYCLOVIR 1 MG: 400 TABLET ORAL at 09:17

## 2018-05-11 RX ADMIN — DICLOFENAC 1 GM: 10 GEL TOPICAL at 09:18

## 2018-05-11 RX ADMIN — INSULIN ASPART 1 UNIT: 100 INJECTION, SOLUTION INTRAVENOUS; SUBCUTANEOUS at 17:35

## 2018-05-11 RX ADMIN — MORPHINE SULFATE 1 MG: 15 TABLET, EXTENDED RELEASE ORAL at 09:00

## 2018-05-11 RX ADMIN — CEFEPIME 1 MLS/HR: 1 INJECTION, POWDER, FOR SOLUTION INTRAMUSCULAR; INTRAVENOUS at 12:31

## 2018-05-11 RX ADMIN — HYDROMORPHONE HYDROCHLORIDE 1 MG: 1 INJECTION, SOLUTION INTRAMUSCULAR; INTRAVENOUS; SUBCUTANEOUS at 11:03

## 2018-05-12 LAB
ABNORMAL IP MESSAGE: 1
ADD MAN DIFF?: NO
ANION GAP: 7 (ref 8–16)
BASOPHIL #: 0 10^3/UL (ref 0–0.1)
BASOPHILS %: 1 % (ref 0–2)
BLOOD UREA NITROGEN: 9 MG/DL (ref 7–20)
CALCIUM: 8.1 MG/DL (ref 8.4–10.2)
CARBON DIOXIDE: 33 MMOL/L (ref 21–31)
CHLORIDE: 103 MMOL/L (ref 97–110)
CREATININE: 2.12 MG/DL (ref 0.44–1)
EOSINOPHILS #: 0 10^3/UL (ref 0–0.5)
EOSINOPHILS %: 0.7 % (ref 0–7)
GLUCOSE: 64 MG/DL (ref 70–220)
HEMATOCRIT: 24.7 % (ref 37–47)
HEMOGLOBIN: 8 G/DL (ref 12–16)
LYMPHOCYTES #: 0.9 10^3/UL (ref 0.8–2.9)
LYMPHOCYTES %: 20.5 % (ref 15–51)
MAGNESIUM: 1.9 MG/DL (ref 1.7–2.5)
MEAN CORPUSCULAR HEMOGLOBIN: 35.1 PG (ref 29–33)
MEAN CORPUSCULAR HGB CONC: 32.4 G/DL (ref 32–37)
MEAN CORPUSCULAR VOLUME: 108.3 FL (ref 82–101)
MEAN PLATELET VOLUME: 10.3 FL (ref 7.4–10.4)
MONOCYTE #: 0.4 10^3/UL (ref 0.3–0.9)
MONOCYTES %: 9.9 % (ref 0–11)
NEUTROPHIL #: 2.8 10^3/UL (ref 1.6–7.5)
NEUTROPHILS %: 66.9 % (ref 39–77)
NUCLEATED RED BLOOD CELLS #: 0 10^3/UL (ref 0–0)
NUCLEATED RED BLOOD CELLS%: 0 /100WBC (ref 0–0)
PHOSPHORUS: 2.9 MG/DL (ref 2.5–4.9)
PLATELET COUNT: 158 10^3/UL (ref 140–415)
POSITIVE DIFF: (no result)
POTASSIUM: 4.1 MMOL/L (ref 3.5–5.1)
RED BLOOD COUNT: 2.28 10^6/UL (ref 4.2–5.4)
RED CELL DISTRIBUTION WIDTH: 24.6 % (ref 11.5–14.5)
SODIUM: 139 MMOL/L (ref 135–144)
WHITE BLOOD COUNT: 4.2 10^3/UL (ref 4.8–10.8)

## 2018-05-12 RX ADMIN — VITAMIN A AND D 1 APPLIC: 30.8 OINTMENT TOPICAL at 09:07

## 2018-05-12 RX ADMIN — Medication 1 CAP: at 20:43

## 2018-05-12 RX ADMIN — INSULIN ASPART 1 UNIT: 100 INJECTION, SOLUTION INTRAVENOUS; SUBCUTANEOUS at 08:00

## 2018-05-12 RX ADMIN — DICLOFENAC 1 GM: 10 GEL TOPICAL at 20:44

## 2018-05-12 RX ADMIN — MORPHINE SULFATE 1 MG: 15 TABLET, EXTENDED RELEASE ORAL at 20:43

## 2018-05-12 RX ADMIN — HYDROMORPHONE HYDROCHLORIDE 1 MG: 1 INJECTION, SOLUTION INTRAMUSCULAR; INTRAVENOUS; SUBCUTANEOUS at 09:20

## 2018-05-12 RX ADMIN — ANALGESIC BALM 1 APPLIC: 1.74; 4.06 OINTMENT TOPICAL at 06:00

## 2018-05-12 RX ADMIN — HYDROMORPHONE HYDROCHLORIDE 1 MG: 1 INJECTION, SOLUTION INTRAMUSCULAR; INTRAVENOUS; SUBCUTANEOUS at 19:50

## 2018-05-12 RX ADMIN — DICLOFENAC 1 GM: 10 GEL TOPICAL at 17:00

## 2018-05-12 RX ADMIN — FLUCONAZOLE 1 MG: 100 TABLET ORAL at 09:07

## 2018-05-12 RX ADMIN — CITALOPRAM HYDROBROMIDE 1 MG: 20 TABLET ORAL at 09:07

## 2018-05-12 RX ADMIN — MORPHINE SULFATE 1 MG: 15 TABLET, EXTENDED RELEASE ORAL at 09:00

## 2018-05-12 RX ADMIN — NYSTATIN 1 APPLIC: 100000 POWDER TOPICAL at 09:16

## 2018-05-12 RX ADMIN — ANALGESIC BALM 1 APPLIC: 1.74; 4.06 OINTMENT TOPICAL at 17:00

## 2018-05-12 RX ADMIN — PREGABALIN 1 MG: 75 CAPSULE ORAL at 20:43

## 2018-05-12 RX ADMIN — PREGABALIN 1 MG: 75 CAPSULE ORAL at 09:07

## 2018-05-12 RX ADMIN — Medication 1 CAP: at 09:07

## 2018-05-12 RX ADMIN — PANTOPRAZOLE SODIUM 1 MG: 40 TABLET, DELAYED RELEASE ORAL at 06:11

## 2018-05-12 RX ADMIN — EPOETIN ALFA 1 UNITS: 3000 SOLUTION INTRAVENOUS; SUBCUTANEOUS at 17:00

## 2018-05-12 RX ADMIN — INSULIN ASPART 1 UNIT: 100 INJECTION, SOLUTION INTRAVENOUS; SUBCUTANEOUS at 12:00

## 2018-05-12 RX ADMIN — Medication 1 CAP: at 12:11

## 2018-05-12 RX ADMIN — HYDROMORPHONE HYDROCHLORIDE 1 MG: 1 INJECTION, SOLUTION INTRAMUSCULAR; INTRAVENOUS; SUBCUTANEOUS at 13:45

## 2018-05-12 RX ADMIN — VITAMIN D, TAB 1000IU (100/BT) 1 UNIT: 25 TAB at 09:07

## 2018-05-12 RX ADMIN — AMLODIPINE BESYLATE 1 MG: 5 TABLET ORAL at 09:08

## 2018-05-12 RX ADMIN — INSULIN ASPART 1 UNIT: 100 INJECTION, SOLUTION INTRAVENOUS; SUBCUTANEOUS at 17:05

## 2018-05-12 RX ADMIN — DICLOFENAC 1 GM: 10 GEL TOPICAL at 09:00

## 2018-05-12 RX ADMIN — NYSTATIN 1 APPLIC: 100000 POWDER TOPICAL at 20:44

## 2018-05-12 RX ADMIN — INSULIN ASPART 1 UNIT: 100 INJECTION, SOLUTION INTRAVENOUS; SUBCUTANEOUS at 20:41

## 2018-05-12 RX ADMIN — ANALGESIC BALM 1 APPLIC: 1.74; 4.06 OINTMENT TOPICAL at 12:00

## 2018-05-12 RX ADMIN — DICLOFENAC 1 GM: 10 GEL TOPICAL at 12:07

## 2018-05-13 LAB
HAAIG REFLEX: (no result)
HEPATITIS B CORE ANTIBODY: NEGATIVE
HEPATITIS B SURFACE ANTIGEN: NEGATIVE
HEPATITIS C VIRAL ANTIBODY: NEGATIVE

## 2018-05-13 RX ADMIN — SODIUM CHLORIDE 1 MLS/HR: 9 INJECTION, SOLUTION INTRAVENOUS at 17:28

## 2018-05-13 RX ADMIN — DICLOFENAC 1 GM: 10 GEL TOPICAL at 21:20

## 2018-05-13 RX ADMIN — INSULIN ASPART 1 UNIT: 100 INJECTION, SOLUTION INTRAVENOUS; SUBCUTANEOUS at 12:00

## 2018-05-13 RX ADMIN — FLUCONAZOLE 1 MG: 100 TABLET ORAL at 09:05

## 2018-05-13 RX ADMIN — VITAMIN D, TAB 1000IU (100/BT) 1 UNIT: 25 TAB at 09:05

## 2018-05-13 RX ADMIN — PREGABALIN 1 MG: 75 CAPSULE ORAL at 09:05

## 2018-05-13 RX ADMIN — ANALGESIC BALM 1 APPLIC: 1.74; 4.06 OINTMENT TOPICAL at 12:00

## 2018-05-13 RX ADMIN — HYDROMORPHONE HYDROCHLORIDE 1 MG: 1 INJECTION, SOLUTION INTRAMUSCULAR; INTRAVENOUS; SUBCUTANEOUS at 22:15

## 2018-05-13 RX ADMIN — ANALGESIC BALM 1 APPLIC: 1.74; 4.06 OINTMENT TOPICAL at 00:01

## 2018-05-13 RX ADMIN — ANALGESIC BALM 1 APPLIC: 1.74; 4.06 OINTMENT TOPICAL at 23:27

## 2018-05-13 RX ADMIN — Medication 1 CAP: at 12:50

## 2018-05-13 RX ADMIN — ANALGESIC BALM 1 APPLIC: 1.74; 4.06 OINTMENT TOPICAL at 17:27

## 2018-05-13 RX ADMIN — HYDROMORPHONE HYDROCHLORIDE 1 MG: 1 INJECTION, SOLUTION INTRAMUSCULAR; INTRAVENOUS; SUBCUTANEOUS at 06:49

## 2018-05-13 RX ADMIN — MORPHINE SULFATE 1 MG: 15 TABLET, EXTENDED RELEASE ORAL at 09:00

## 2018-05-13 RX ADMIN — VITAMIN A AND D 1 APPLIC: 30.8 OINTMENT TOPICAL at 09:04

## 2018-05-13 RX ADMIN — Medication 1 CAP: at 09:05

## 2018-05-13 RX ADMIN — Medication 1 CAP: at 21:18

## 2018-05-13 RX ADMIN — INSULIN ASPART 1 UNIT: 100 INJECTION, SOLUTION INTRAVENOUS; SUBCUTANEOUS at 21:00

## 2018-05-13 RX ADMIN — HYDROMORPHONE HYDROCHLORIDE 1 MG: 1 INJECTION, SOLUTION INTRAMUSCULAR; INTRAVENOUS; SUBCUTANEOUS at 14:14

## 2018-05-13 RX ADMIN — PREGABALIN 1 MG: 75 CAPSULE ORAL at 21:18

## 2018-05-13 RX ADMIN — NYSTATIN 1 APPLIC: 100000 POWDER TOPICAL at 09:05

## 2018-05-13 RX ADMIN — NYSTATIN 1 APPLIC: 100000 POWDER TOPICAL at 21:21

## 2018-05-13 RX ADMIN — INSULIN ASPART 1 UNIT: 100 INJECTION, SOLUTION INTRAVENOUS; SUBCUTANEOUS at 08:00

## 2018-05-13 RX ADMIN — CITALOPRAM HYDROBROMIDE 1 MG: 20 TABLET ORAL at 09:05

## 2018-05-13 RX ADMIN — DICLOFENAC 1 GM: 10 GEL TOPICAL at 17:00

## 2018-05-13 RX ADMIN — DICLOFENAC 1 GM: 10 GEL TOPICAL at 12:50

## 2018-05-13 RX ADMIN — PANTOPRAZOLE SODIUM 1 MG: 40 TABLET, DELAYED RELEASE ORAL at 05:55

## 2018-05-13 RX ADMIN — MORPHINE SULFATE 1 MG: 15 TABLET, EXTENDED RELEASE ORAL at 21:00

## 2018-05-13 RX ADMIN — ANALGESIC BALM 1 APPLIC: 1.74; 4.06 OINTMENT TOPICAL at 05:55

## 2018-05-13 RX ADMIN — DICLOFENAC 1 GM: 10 GEL TOPICAL at 09:00

## 2018-05-13 RX ADMIN — AMLODIPINE BESYLATE 1 MG: 5 TABLET ORAL at 09:05

## 2018-05-13 RX ADMIN — INSULIN ASPART 1 UNIT: 100 INJECTION, SOLUTION INTRAVENOUS; SUBCUTANEOUS at 17:28

## 2018-05-14 LAB
ABNORMAL IP MESSAGE: 1
ADD MAN DIFF?: NO
ANION GAP: 8 (ref 8–16)
BASOPHIL #: 0.1 10^3/UL (ref 0–0.1)
BASOPHILS %: 1 % (ref 0–2)
BLOOD UREA NITROGEN: 19 MG/DL (ref 7–20)
CALCIUM: 8.5 MG/DL (ref 8.4–10.2)
CARBON DIOXIDE: 32 MMOL/L (ref 21–31)
CHLORIDE: 100 MMOL/L (ref 97–110)
CREATININE: 3.48 MG/DL (ref 0.44–1)
EOSINOPHILS #: 0 10^3/UL (ref 0–0.5)
EOSINOPHILS %: 0.8 % (ref 0–7)
GLUCOSE: 75 MG/DL (ref 70–220)
HEMATOCRIT: 24.4 % (ref 37–47)
HEMOGLOBIN: 8.1 G/DL (ref 12–16)
LYMPHOCYTES #: 0.8 10^3/UL (ref 0.8–2.9)
LYMPHOCYTES %: 17.6 % (ref 15–51)
MAGNESIUM: 2 MG/DL (ref 1.7–2.5)
MEAN CORPUSCULAR HEMOGLOBIN: 36.2 PG (ref 29–33)
MEAN CORPUSCULAR HGB CONC: 33.2 G/DL (ref 32–37)
MEAN CORPUSCULAR VOLUME: 108.9 FL (ref 82–101)
MEAN PLATELET VOLUME: 10.3 FL (ref 7.4–10.4)
MONOCYTE #: 0.5 10^3/UL (ref 0.3–0.9)
MONOCYTES %: 10.1 % (ref 0–11)
NEUTROPHIL #: 3.3 10^3/UL (ref 1.6–7.5)
NEUTROPHILS %: 69.5 % (ref 39–77)
NUCLEATED RED BLOOD CELLS #: 0 10^3/UL (ref 0–0)
NUCLEATED RED BLOOD CELLS%: 0 /100WBC (ref 0–0)
PHOSPHORUS: 5.8 MG/DL (ref 2.5–4.9)
PLATELET COUNT: 136 10^3/UL (ref 140–415)
POSITIVE DIFF: (no result)
POTASSIUM: 5.2 MMOL/L (ref 3.5–5.1)
RED BLOOD COUNT: 2.24 10^6/UL (ref 4.2–5.4)
SODIUM: 135 MMOL/L (ref 135–144)
WHITE BLOOD COUNT: 4.8 10^3/UL (ref 4.8–10.8)

## 2018-05-14 RX ADMIN — ANALGESIC BALM 1 APPLIC: 1.74; 4.06 OINTMENT TOPICAL at 16:54

## 2018-05-14 RX ADMIN — VITAMIN A AND D 1 APPLIC: 30.8 OINTMENT TOPICAL at 07:54

## 2018-05-14 RX ADMIN — INSULIN ASPART 1 UNIT: 100 INJECTION, SOLUTION INTRAVENOUS; SUBCUTANEOUS at 16:54

## 2018-05-14 RX ADMIN — DICLOFENAC 1 GM: 10 GEL TOPICAL at 09:00

## 2018-05-14 RX ADMIN — INSULIN ASPART 1 UNIT: 100 INJECTION, SOLUTION INTRAVENOUS; SUBCUTANEOUS at 12:00

## 2018-05-14 RX ADMIN — SODIUM POLYSTYRENE SULFONATE 1 GM: 15 SUSPENSION ORAL; RECTAL at 11:00

## 2018-05-14 RX ADMIN — PANTOPRAZOLE SODIUM 1 MG: 40 TABLET, DELAYED RELEASE ORAL at 05:51

## 2018-05-14 RX ADMIN — INSULIN ASPART 1 UNIT: 100 INJECTION, SOLUTION INTRAVENOUS; SUBCUTANEOUS at 07:55

## 2018-05-14 RX ADMIN — VITAMIN D, TAB 1000IU (100/BT) 1 UNIT: 25 TAB at 07:54

## 2018-05-14 RX ADMIN — PREGABALIN 1 MG: 75 CAPSULE ORAL at 07:54

## 2018-05-14 RX ADMIN — AMLODIPINE BESYLATE 1 MG: 5 TABLET ORAL at 07:55

## 2018-05-14 RX ADMIN — DICLOFENAC 1 GM: 10 GEL TOPICAL at 16:54

## 2018-05-14 RX ADMIN — FLUCONAZOLE 1 MG: 100 TABLET ORAL at 07:54

## 2018-05-14 RX ADMIN — ANALGESIC BALM 1 APPLIC: 1.74; 4.06 OINTMENT TOPICAL at 05:53

## 2018-05-14 RX ADMIN — Medication 1 CAP: at 07:53

## 2018-05-14 RX ADMIN — MORPHINE SULFATE 1 MG: 15 TABLET, EXTENDED RELEASE ORAL at 07:55

## 2018-05-14 RX ADMIN — NYSTATIN 1 APPLIC: 100000 POWDER TOPICAL at 07:56

## 2018-05-14 RX ADMIN — DICLOFENAC 1 GM: 10 GEL TOPICAL at 13:00

## 2018-05-14 RX ADMIN — HYDROMORPHONE HYDROCHLORIDE 1 MG: 1 INJECTION, SOLUTION INTRAMUSCULAR; INTRAVENOUS; SUBCUTANEOUS at 10:47

## 2018-05-14 RX ADMIN — CITALOPRAM HYDROBROMIDE 1 MG: 20 TABLET ORAL at 07:54

## 2018-05-14 RX ADMIN — ANALGESIC BALM 1 APPLIC: 1.74; 4.06 OINTMENT TOPICAL at 12:00

## 2018-05-14 RX ADMIN — INSULIN ASPART 1 UNIT: 100 INJECTION, SOLUTION INTRAVENOUS; SUBCUTANEOUS at 21:00

## 2018-05-14 RX ADMIN — Medication 1 CAP: at 12:14

## 2018-05-21 ENCOUNTER — HOSPITAL ENCOUNTER (EMERGENCY)
Age: 63
LOS: 1 days | Discharge: HOME | End: 2018-05-22

## 2018-05-21 ENCOUNTER — HOSPITAL ENCOUNTER (EMERGENCY)
Dept: HOSPITAL 91 - E/R | Age: 63
LOS: 1 days | Discharge: HOME | End: 2018-05-22
Payer: MEDICARE

## 2018-05-21 DIAGNOSIS — Z87.891: ICD-10-CM

## 2018-05-21 DIAGNOSIS — N18.6: ICD-10-CM

## 2018-05-21 DIAGNOSIS — T40.601A: Primary | ICD-10-CM

## 2018-05-21 DIAGNOSIS — E11.22: ICD-10-CM

## 2018-05-21 DIAGNOSIS — I12.0: ICD-10-CM

## 2018-05-21 LAB
ABNORMAL IP MESSAGE: 1
ADD MAN DIFF?: NO
ALANINE AMINOTRANSFERASE: 19 IU/L (ref 13–69)
ALBUMIN/GLOBULIN RATIO: 0.7
ALBUMIN: 2.6 G/DL (ref 3.3–4.9)
ALKALINE PHOSPHATASE: 137 IU/L (ref 42–121)
ANION GAP: 11 (ref 8–16)
ASPARTATE AMINO TRANSFERASE: 50 IU/L (ref 15–46)
BASOPHIL #: 0.1 10^3/UL (ref 0–0.1)
BASOPHILS %: 0.4 % (ref 0–2)
BILIRUBIN,DIRECT: 0 MG/DL (ref 0–0.2)
BILIRUBIN,TOTAL: 0 MG/DL (ref 0.2–1.3)
BLOOD UREA NITROGEN: 21 MG/DL (ref 7–20)
CALCIUM: 8.6 MG/DL (ref 8.4–10.2)
CARBON DIOXIDE: 30 MMOL/L (ref 21–31)
CHLORIDE: 103 MMOL/L (ref 97–110)
CREATININE: 4.36 MG/DL (ref 0.44–1)
EOSINOPHILS #: 0 10^3/UL (ref 0–0.5)
EOSINOPHILS %: 0 % (ref 0–7)
GLOBULIN: 3.7 G/DL (ref 1.3–3.2)
GLUCOSE: 120 MG/DL (ref 70–220)
HEMATOCRIT: 27.6 % (ref 37–47)
HEMOGLOBIN: 8.9 G/DL (ref 12–16)
INR: 1.58
LACTIC ACID: 1.4 MMOL/L (ref 0.5–2)
LYMPHOCYTES #: 1.2 10^3/UL (ref 0.8–2.9)
LYMPHOCYTES %: 10.1 % (ref 15–51)
MEAN CORPUSCULAR HEMOGLOBIN: 35.6 PG (ref 29–33)
MEAN CORPUSCULAR HGB CONC: 32.2 G/DL (ref 32–37)
MEAN CORPUSCULAR VOLUME: 110.4 FL (ref 82–101)
MEAN PLATELET VOLUME: 10.7 FL (ref 7.4–10.4)
MONOCYTE #: 0.6 10^3/UL (ref 0.3–0.9)
MONOCYTES %: 4.6 % (ref 0–11)
NEUTROPHIL #: 10.1 10^3/UL (ref 1.6–7.5)
NEUTROPHILS %: 84.3 % (ref 39–77)
NUCLEATED RED BLOOD CELLS #: 0 10^3/UL (ref 0–0)
NUCLEATED RED BLOOD CELLS%: 0.3 /100WBC (ref 0–0)
PARTIAL THROMBOPLASTIN TIME: 43.7 SEC (ref 25–35)
PLATELET COUNT: 138 10^3/UL (ref 140–415)
POSITIVE DIFF: (no result)
POTASSIUM: 4 MMOL/L (ref 3.5–5.1)
PROTIME: 19.2 SEC (ref 11.9–14.9)
PT RATIO: 1.5
RED BLOOD COUNT: 2.5 10^6/UL (ref 4.2–5.4)
SODIUM: 140 MMOL/L (ref 135–144)
TOTAL PROTEIN: 6.3 G/DL (ref 6.1–8.1)
WHITE BLOOD COUNT: 11.9 10^3/UL (ref 4.8–10.8)

## 2018-05-21 PROCEDURE — 96374 THER/PROPH/DIAG INJ IV PUSH: CPT

## 2018-05-21 PROCEDURE — 83605 ASSAY OF LACTIC ACID: CPT

## 2018-05-21 PROCEDURE — 85025 COMPLETE CBC W/AUTO DIFF WBC: CPT

## 2018-05-21 PROCEDURE — 85610 PROTHROMBIN TIME: CPT

## 2018-05-21 PROCEDURE — 82962 GLUCOSE BLOOD TEST: CPT

## 2018-05-21 PROCEDURE — 96376 TX/PRO/DX INJ SAME DRUG ADON: CPT

## 2018-05-21 PROCEDURE — 71045 X-RAY EXAM CHEST 1 VIEW: CPT

## 2018-05-21 PROCEDURE — 93005 ELECTROCARDIOGRAM TRACING: CPT

## 2018-05-21 PROCEDURE — 96375 TX/PRO/DX INJ NEW DRUG ADDON: CPT

## 2018-05-21 PROCEDURE — 36415 COLL VENOUS BLD VENIPUNCTURE: CPT

## 2018-05-21 PROCEDURE — 85730 THROMBOPLASTIN TIME PARTIAL: CPT

## 2018-05-21 PROCEDURE — 87040 BLOOD CULTURE FOR BACTERIA: CPT

## 2018-05-21 PROCEDURE — 80053 COMPREHEN METABOLIC PANEL: CPT

## 2018-05-21 PROCEDURE — 99285 EMERGENCY DEPT VISIT HI MDM: CPT

## 2018-05-21 RX ADMIN — NALOXONE HYDROCHLORIDE 1 MG: 0.4 INJECTION, SOLUTION INTRAMUSCULAR; INTRAVENOUS; SUBCUTANEOUS at 20:50

## 2018-05-21 RX ADMIN — ONDANSETRON HYDROCHLORIDE 1 MG: 2 INJECTION, SOLUTION INTRAMUSCULAR; INTRAVENOUS at 21:23

## 2018-05-21 RX ADMIN — THIAMINE HYDROCHLORIDE 1 MLS/HR: 100 INJECTION, SOLUTION INTRAMUSCULAR; INTRAVENOUS at 20:49

## 2018-05-22 RX ADMIN — NALOXONE HYDROCHLORIDE 1 MG: 0.4 INJECTION, SOLUTION INTRAMUSCULAR; INTRAVENOUS; SUBCUTANEOUS at 01:04

## 2018-05-22 RX ADMIN — ONDANSETRON HYDROCHLORIDE 1 MG: 2 INJECTION, SOLUTION INTRAMUSCULAR; INTRAVENOUS at 01:05

## 2018-11-16 ENCOUNTER — HOSPITAL ENCOUNTER (INPATIENT)
Age: 63
LOS: 3 days | Discharge: HOME | DRG: 150 | End: 2018-11-19

## 2018-11-16 ENCOUNTER — HOSPITAL ENCOUNTER (INPATIENT)
Dept: HOSPITAL 91 - E/R | Age: 63
LOS: 3 days | Discharge: HOME | DRG: 150 | End: 2018-11-19
Payer: MEDICARE

## 2018-11-16 DIAGNOSIS — M32.9: ICD-10-CM

## 2018-11-16 DIAGNOSIS — J96.01: ICD-10-CM

## 2018-11-16 DIAGNOSIS — D63.1: ICD-10-CM

## 2018-11-16 DIAGNOSIS — D69.6: ICD-10-CM

## 2018-11-16 DIAGNOSIS — R00.1: ICD-10-CM

## 2018-11-16 DIAGNOSIS — E78.00: ICD-10-CM
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
## 2018-11-16 DIAGNOSIS — Z99.2: ICD-10-CM

## 2018-11-16 DIAGNOSIS — E87.5: ICD-10-CM

## 2018-11-16 DIAGNOSIS — N18.6: ICD-10-CM
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
## 2018-11-16 DIAGNOSIS — R04.0: Primary | ICD-10-CM

## 2018-11-16 DIAGNOSIS — J34.2: ICD-10-CM

## 2018-11-16 DIAGNOSIS — S02.2XXA: ICD-10-CM

## 2018-11-16 DIAGNOSIS — R21: ICD-10-CM

## 2018-11-16 DIAGNOSIS — G93.41: ICD-10-CM

## 2018-11-16 DIAGNOSIS — I12.0: ICD-10-CM

## 2018-11-16 DIAGNOSIS — Z87.891: ICD-10-CM

## 2018-11-16 LAB
ABNORMAL IP MESSAGE: 1
ADD MAN DIFF?: YES
ALANINE AMINOTRANSFERASE: 14 IU/L (ref 13–69)
ALBUMIN/GLOBULIN RATIO: 1.39
ALBUMIN: 3.9 G/DL (ref 3.3–4.9)
ALKALINE PHOSPHATASE: 114 IU/L (ref 42–121)
ANION GAP: 14 (ref 5–13)
ASPARTATE AMINO TRANSFERASE: 18 IU/L (ref 15–46)
BAND NEUTROPHILS #M: 0 10^3/UL (ref 0–0.6)
BAND NEUTROPHILS % (M): 1 % (ref 0–4)
BASOPHIL #M: 0.1 10^3/UL (ref 0–0)
BASOPHILS % (M): 5 % (ref 0–2)
BILIRUBIN,DIRECT: 0 MG/DL (ref 0–0.2)
BILIRUBIN,TOTAL: 0 MG/DL (ref 0.2–1.3)
BLOOD UREA NITROGEN: 81 MG/DL (ref 7–20)
CALCIUM: 8.9 MG/DL (ref 8.4–10.2)
CARBON DIOXIDE: 25 MMOL/L (ref 21–31)
CHLORIDE: 100 MMOL/L (ref 97–110)
CREATININE: 7.42 MG/DL (ref 0.44–1)
EOSINOPHILS % (M): 10 % (ref 0–7)
GIANT THROMBO% (M): 1 % (ref 0–0)
GLOBULIN: 2.8 G/DL (ref 1.3–3.2)
GLUCOSE: 89 MG/DL (ref 70–220)
HEMATOCRIT: 27.8 % (ref 37–47)
HEMOGLOBIN: 8.7 G/DL (ref 12–16)
HEPATITIS B SURFACE ANTIGEN: NEGATIVE
IMMATURE GRANS #M: 0 10^3/UL (ref 0–0.03)
IMMATURE GRANS % (M): 0 % (ref 0–0.43)
INR: 1.22
LYMPHOCYTES #M: 0.8 10^3/UL (ref 0.8–2.9)
LYMPHOCYTES % (M): 23 % (ref 15–51)
MEAN CORPUSCULAR HEMOGLOBIN: 32 PG (ref 29–33)
MEAN CORPUSCULAR HGB CONC: 31.3 G/DL (ref 32–37)
MEAN CORPUSCULAR VOLUME: 102.2 FL (ref 82–101)
MEAN PLATELET VOLUME: 10 FL (ref 7.4–10.4)
MONOCYTE #M: 0.1 10^3/UL (ref 0.3–0.9)
MONOCYTES % (M): 3 % (ref 0–11)
MYELOCYTES #M: 0 10^3/UL (ref 0–0)
MYELOCYTES % (M): 1 % (ref 0–0)
NUCLEATED RED BLOOD CELLS%: 0 /100WBC (ref 0–0)
PARTIAL THROMBOPLASTIN TIME: 39.3 SEC (ref 23–35)
PLATELET COUNT: 82 10^3/UL (ref 140–415)
PLATELET ESTIMATE: (no result)
POLYCHROMASIA: (no result) (ref 0–0)
POSITIVE DIFF: (no result)
POTASSIUM: 6.9 MMOL/L (ref 3.5–5.1)
PROTIME: 15.6 SEC (ref 11.9–14.9)
PT RATIO: 1.2
RED BLOOD COUNT: 2.72 10^6/UL (ref 4.2–5.4)
RED CELL DISTRIBUTION WIDTH: 14 % (ref 11.5–14.5)
SEG NEUT #M: 2 10^3/UL (ref 1.6–7.5)
SEGMENTED NEUTROPHILS (M) %: 57 % (ref 39–77)
SODIUM: 139 MMOL/L (ref 135–144)
TOTAL PROTEIN: 6.7 G/DL (ref 6.1–8.1)
WHITE BLOOD COUNT: 3.5 10^3/UL (ref 4.8–10.8)

## 2018-11-16 PROCEDURE — 5A1D70Z PERFORMANCE OF URINARY FILTRATION, INTERMITTENT, LESS THAN 6 HOURS PER DAY: ICD-10-PCS

## 2018-11-16 PROCEDURE — 97162 PT EVAL MOD COMPLEX 30 MIN: CPT

## 2018-11-16 PROCEDURE — 2Y41X5Z PACKING OF NASAL REGION USING PACKING MATERIAL: ICD-10-PCS

## 2018-11-16 PROCEDURE — 94664 DEMO&/EVAL PT USE INHALER: CPT

## 2018-11-16 PROCEDURE — 86901 BLOOD TYPING SEROLOGIC RH(D): CPT

## 2018-11-16 PROCEDURE — 80069 RENAL FUNCTION PANEL: CPT

## 2018-11-16 PROCEDURE — 85025 COMPLETE CBC W/AUTO DIFF WBC: CPT

## 2018-11-16 PROCEDURE — 85610 PROTHROMBIN TIME: CPT

## 2018-11-16 PROCEDURE — 86900 BLOOD TYPING SEROLOGIC ABO: CPT

## 2018-11-16 PROCEDURE — 80053 COMPREHEN METABOLIC PANEL: CPT

## 2018-11-16 PROCEDURE — 94640 AIRWAY INHALATION TREATMENT: CPT

## 2018-11-16 PROCEDURE — 70486 CT MAXILLOFACIAL W/O DYE: CPT

## 2018-11-16 PROCEDURE — 82962 GLUCOSE BLOOD TEST: CPT

## 2018-11-16 PROCEDURE — 85730 THROMBOPLASTIN TIME PARTIAL: CPT

## 2018-11-16 PROCEDURE — 70450 CT HEAD/BRAIN W/O DYE: CPT

## 2018-11-16 PROCEDURE — 86850 RBC ANTIBODY SCREEN: CPT

## 2018-11-16 PROCEDURE — 87340 HEPATITIS B SURFACE AG IA: CPT

## 2018-11-16 PROCEDURE — 093K7ZZ CONTROL BLEEDING IN NASAL MUCOSA AND SOFT TISSUE, VIA NATURAL OR ARTIFICIAL OPENING: ICD-10-PCS

## 2018-11-16 PROCEDURE — 99291 CRITICAL CARE FIRST HOUR: CPT

## 2018-11-16 PROCEDURE — 71045 X-RAY EXAM CHEST 1 VIEW: CPT

## 2018-11-16 PROCEDURE — 93005 ELECTROCARDIOGRAM TRACING: CPT

## 2018-11-16 PROCEDURE — 96372 THER/PROPH/DIAG INJ SC/IM: CPT

## 2018-11-16 PROCEDURE — 90935 HEMODIALYSIS ONE EVALUATION: CPT

## 2018-11-16 PROCEDURE — 83735 ASSAY OF MAGNESIUM: CPT

## 2018-11-16 RX ADMIN — SODIUM BICARBONATE 1 ML: 84 INJECTION INTRAVENOUS at 13:10

## 2018-11-16 RX ADMIN — HYDROCODONE BITARTRATE AND ACETAMINOPHEN 1 TAB: 10; 325 TABLET ORAL at 16:04

## 2018-11-16 RX ADMIN — SODIUM CHLORIDE 1 MLS/HR: 9 INJECTION, SOLUTION INTRAVENOUS at 14:22

## 2018-11-16 RX ADMIN — HYDROCODONE BITARTRATE AND ACETAMINOPHEN 1 TAB: 10; 325 TABLET ORAL at 23:11

## 2018-11-16 RX ADMIN — HYDROCODONE BITARTRATE AND ACETAMINOPHEN 1 TAB: 5; 325 TABLET ORAL at 08:19

## 2018-11-16 RX ADMIN — DIPHENHYDRAMINE HYDROCHLORIDE 1 MG: 25 CAPSULE ORAL at 10:54

## 2018-11-16 RX ADMIN — ALBUTEROL SULFATE 1 MG: 2.5 SOLUTION RESPIRATORY (INHALATION) at 13:21

## 2018-11-16 RX ADMIN — DIPHENHYDRAMINE HYDROCHLORIDE 1 MG: 50 INJECTION, SOLUTION INTRAMUSCULAR; INTRAVENOUS at 16:04

## 2018-11-16 RX ADMIN — DICLOFENAC 1 GM: 10 GEL TOPICAL at 20:56

## 2018-11-16 RX ADMIN — OXYMETAZOLINE HYDROCHLORIDE 1 SPRAY: 5 SPRAY NASAL at 08:06

## 2018-11-16 RX ADMIN — DICLOFENAC 1 GM: 10 GEL TOPICAL at 17:00

## 2018-11-16 RX ADMIN — PREGABALIN 1 MG: 75 CAPSULE ORAL at 16:03

## 2018-11-16 RX ADMIN — MORPHINE SULFATE 1 MG: 15 TABLET, EXTENDED RELEASE ORAL at 20:56

## 2018-11-16 RX ADMIN — OXYCODONE HYDROCHLORIDE AND ACETAMINOPHEN 1 MG: 500 TABLET ORAL at 20:55

## 2018-11-16 RX ADMIN — CEFAZOLIN 1 GM: 1 INJECTION, POWDER, FOR SOLUTION INTRAMUSCULAR; INTRAVENOUS at 08:07

## 2018-11-16 RX ADMIN — CEFAZOLIN 1 GM: 1 INJECTION, POWDER, FOR SOLUTION INTRAMUSCULAR; INTRAVENOUS at 08:59

## 2018-11-16 RX ADMIN — CALCIUM CHLORIDE 1 MG: 100 INJECTION INTRAVENOUS; INTRAVENTRICULAR at 13:09

## 2018-11-16 RX ADMIN — CEFTRIAXONE 1 MLS/HR: 1 INJECTION, SOLUTION INTRAVENOUS at 13:09

## 2018-11-16 RX ADMIN — SODIUM POLYSTYRENE SULFONATE 1 GM: 15 SUSPENSION ORAL; RECTAL at 13:20

## 2018-11-16 RX ADMIN — PREGABALIN 1 MG: 75 CAPSULE ORAL at 20:56

## 2018-11-17 LAB
ABNORMAL IP MESSAGE: 1
ABNORMAL IP MESSAGE: 1
ADD MAN DIFF?: NO
ADD MAN DIFF?: NO
ALBUMIN: 3.7 G/DL (ref 3.3–4.9)
ANION GAP: 12 (ref 5–13)
BASOPHIL #: 0 10^3/UL (ref 0–0.1)
BASOPHIL #: 0 10^3/UL (ref 0–0.1)
BASOPHILS %: 0.6 % (ref 0–2)
BASOPHILS %: 0.7 % (ref 0–2)
BLOOD UREA NITROGEN: 35 MG/DL (ref 7–20)
CALCIUM: 8.9 MG/DL (ref 8.4–10.2)
CARBON DIOXIDE: 29 MMOL/L (ref 21–31)
CHLORIDE: 98 MMOL/L (ref 97–110)
CREATININE: 3.66 MG/DL (ref 0.44–1)
EOSINOPHILS #: 0.1 10^3/UL (ref 0–0.5)
EOSINOPHILS #: 0.2 10^3/UL (ref 0–0.5)
EOSINOPHILS %: 1.9 % (ref 0–7)
EOSINOPHILS %: 6.6 % (ref 0–7)
GLUCOSE: 87 MG/DL (ref 70–220)
HEMATOCRIT: 23.9 % (ref 37–47)
HEMATOCRIT: 26.4 % (ref 37–47)
HEMOGLOBIN: 7.4 G/DL (ref 12–16)
HEMOGLOBIN: 8.1 G/DL (ref 12–16)
IMMATURE GRANS #M: 0.01 10^3/UL (ref 0–0.03)
IMMATURE GRANS #M: 0.02 10^3/UL (ref 0–0.03)
IMMATURE GRANS % (M): 0.3 % (ref 0–0.43)
IMMATURE GRANS % (M): 0.7 % (ref 0–0.43)
LYMPHOCYTES #: 0.4 10^3/UL (ref 0.8–2.9)
LYMPHOCYTES #: 0.6 10^3/UL (ref 0.8–2.9)
LYMPHOCYTES %: 13.2 % (ref 15–51)
LYMPHOCYTES %: 20.1 % (ref 15–51)
MAGNESIUM: 2.1 MG/DL (ref 1.7–2.5)
MEAN CORPUSCULAR HEMOGLOBIN: 31.4 PG (ref 29–33)
MEAN CORPUSCULAR HEMOGLOBIN: 31.8 PG (ref 29–33)
MEAN CORPUSCULAR HGB CONC: 30.7 G/DL (ref 32–37)
MEAN CORPUSCULAR HGB CONC: 31 G/DL (ref 32–37)
MEAN CORPUSCULAR VOLUME: 102.3 FL (ref 82–101)
MEAN CORPUSCULAR VOLUME: 102.6 FL (ref 82–101)
MEAN PLATELET VOLUME: 10.8 FL (ref 7.4–10.4)
MEAN PLATELET VOLUME: 10.8 FL (ref 7.4–10.4)
MONOCYTE #: 0.1 10^3/UL (ref 0.3–0.9)
MONOCYTE #: 0.2 10^3/UL (ref 0.3–0.9)
MONOCYTES %: 3.1 % (ref 0–11)
MONOCYTES %: 6.9 % (ref 0–11)
NEUTROPHIL #: 2 10^3/UL (ref 1.6–7.5)
NEUTROPHIL #: 2.6 10^3/UL (ref 1.6–7.5)
NEUTROPHILS %: 65 % (ref 39–77)
NEUTROPHILS %: 80.9 % (ref 39–77)
NUCLEATED RED BLOOD CELLS #: 0 10^3/UL (ref 0–0)
NUCLEATED RED BLOOD CELLS #: 0 10^3/UL (ref 0–0)
NUCLEATED RED BLOOD CELLS%: 0 /100WBC (ref 0–0)
NUCLEATED RED BLOOD CELLS%: 0 /100WBC (ref 0–0)
PHOSPHORUS: 4.8 MG/DL (ref 2.5–4.9)
PLATELET COUNT: 76 10^3/UL (ref 140–415)
PLATELET COUNT: 87 10^3/UL (ref 140–415)
POSITIVE DIFF: (no result)
POSITIVE DIFF: (no result)
POTASSIUM: 5 MMOL/L (ref 3.5–5.1)
RED BLOOD COUNT: 2.33 10^6/UL (ref 4.2–5.4)
RED BLOOD COUNT: 2.58 10^6/UL (ref 4.2–5.4)
RED CELL DISTRIBUTION WIDTH: 14.1 % (ref 11.5–14.5)
RED CELL DISTRIBUTION WIDTH: 14.1 % (ref 11.5–14.5)
SODIUM: 139 MMOL/L (ref 135–144)
WHITE BLOOD COUNT: 3 10^3/UL (ref 4.8–10.8)
WHITE BLOOD COUNT: 3.2 10^3/UL (ref 4.8–10.8)

## 2018-11-17 RX ADMIN — DICLOFENAC 1 GM: 10 GEL TOPICAL at 17:00

## 2018-11-17 RX ADMIN — METOCLOPRAMIDE HYDROCHLORIDE 1 MG: 10 INJECTION, SOLUTION INTRAMUSCULAR; INTRAVENOUS at 21:21

## 2018-11-17 RX ADMIN — Medication 1 MG: at 09:00

## 2018-11-17 RX ADMIN — VITAMIN D, TAB 1000IU (100/BT) 1 UNIT: 25 TAB at 09:48

## 2018-11-17 RX ADMIN — ONDANSETRON HYDROCHLORIDE 1 MG: 2 INJECTION, SOLUTION INTRAMUSCULAR; INTRAVENOUS at 19:29

## 2018-11-17 RX ADMIN — DICLOFENAC 1 GM: 10 GEL TOPICAL at 21:22

## 2018-11-17 RX ADMIN — PREGABALIN 1 MG: 75 CAPSULE ORAL at 12:12

## 2018-11-17 RX ADMIN — CITALOPRAM HYDROBROMIDE 1 MG: 20 TABLET ORAL at 09:48

## 2018-11-17 RX ADMIN — EPOETIN ALFA 1 UNITS: 10000 SOLUTION INTRAVENOUS; SUBCUTANEOUS at 18:14

## 2018-11-17 RX ADMIN — PREGABALIN 1 MG: 75 CAPSULE ORAL at 21:00

## 2018-11-17 RX ADMIN — CEFTRIAXONE 1 MLS/HR: 1 INJECTION, SOLUTION INTRAVENOUS at 12:13

## 2018-11-17 RX ADMIN — DIPHENHYDRAMINE HYDROCHLORIDE, ZINC ACETATE 1 APPLIC: 2; .1 CREAM TOPICAL at 12:33

## 2018-11-17 RX ADMIN — DICLOFENAC 1 GM: 10 GEL TOPICAL at 12:37

## 2018-11-17 RX ADMIN — HYDROCODONE BITARTRATE AND ACETAMINOPHEN 1 TAB: 10; 325 TABLET ORAL at 05:55

## 2018-11-17 RX ADMIN — ACETAMINOPHEN 1 MG: 325 TABLET, FILM COATED ORAL at 01:28

## 2018-11-17 RX ADMIN — MULTIPLE VITAMINS W/ MINERALS TAB 1 TAB: TAB at 09:49

## 2018-11-17 RX ADMIN — OXYCODONE HYDROCHLORIDE AND ACETAMINOPHEN 1 MG: 500 TABLET ORAL at 09:49

## 2018-11-17 RX ADMIN — ACETAMINOPHEN 1 MG: 325 TABLET, FILM COATED ORAL at 13:56

## 2018-11-17 RX ADMIN — PANTOPRAZOLE SODIUM 1 MG: 40 TABLET, DELAYED RELEASE ORAL at 05:55

## 2018-11-17 RX ADMIN — DICLOFENAC 1 GM: 10 GEL TOPICAL at 09:53

## 2018-11-17 RX ADMIN — HYDRALAZINE HYDROCHLORIDE 1 MG: 20 INJECTION INTRAMUSCULAR; INTRAVENOUS at 21:22

## 2018-11-17 RX ADMIN — CALCIUM CARBONATE (ANTACID) CHEW TAB 500 MG 1 MG: 500 CHEW TAB at 01:28

## 2018-11-17 RX ADMIN — AMLODIPINE BESYLATE 1 MG: 5 TABLET ORAL at 09:53

## 2018-11-17 RX ADMIN — ONDANSETRON HYDROCHLORIDE 1 MG: 2 INJECTION, SOLUTION INTRAMUSCULAR; INTRAVENOUS at 15:24

## 2018-11-17 RX ADMIN — MORPHINE SULFATE 1 MG: 15 TABLET, EXTENDED RELEASE ORAL at 09:49

## 2018-11-18 LAB
ABNORMAL IP MESSAGE: 1
ADD MAN DIFF?: YES
ALBUMIN: 4.6 G/DL (ref 3.3–4.9)
ANION GAP: 22 (ref 5–13)
ANISOCYTOSIS: (no result) (ref 0–0)
BLOOD UREA NITROGEN: 53 MG/DL (ref 7–20)
BURR CELLS: (no result) (ref 0–0)
CALCIUM: 8.9 MG/DL (ref 8.4–10.2)
CARBON DIOXIDE: 25 MMOL/L (ref 21–31)
CHLORIDE: 91 MMOL/L (ref 97–110)
CREATININE: 5.24 MG/DL (ref 0.44–1)
GIANT THROMBO% (M): 2 % (ref 0–0)
GLUCOSE: 165 MG/DL (ref 70–220)
HEMATOCRIT: 29.3 % (ref 37–47)
HEMOGLOBIN: 8.7 G/DL (ref 12–16)
IMMATURE GRANS #M: 0.03 10^3/UL (ref 0–0.03)
IMMATURE GRANS % (M): 0.6 % (ref 0–0.43)
LYMPHOCYTES #M: 0.2 10^3/UL (ref 0.8–2.9)
LYMPHOCYTES % (M): 5 % (ref 15–51)
MACROCYTOSIS: (no result) (ref 0–0)
MAGNESIUM: 2.4 MG/DL (ref 1.7–2.5)
MEAN CORPUSCULAR HEMOGLOBIN: 31.5 PG (ref 29–33)
MEAN CORPUSCULAR HGB CONC: 29.7 G/DL (ref 32–37)
MEAN CORPUSCULAR VOLUME: 106.2 FL (ref 82–101)
MEAN PLATELET VOLUME: 11 FL (ref 7.4–10.4)
MONOCYTE #M: 0 10^3/UL (ref 0.3–0.9)
MONOCYTES % (M): 1 % (ref 0–11)
NUCLEATED RED BLOOD CELLS%: 0.4 /100WBC (ref 0–0)
OVALOCYTES: (no result) (ref 0–0)
PHOSPHORUS: 9.5 MG/DL (ref 2.5–4.9)
PLATELET COUNT: 120 10^3/UL (ref 140–415)
PLATELET ESTIMATE: (no result)
POIKILOCYTOSIS: (no result) (ref 0–0)
POLYCHROMASIA: (no result) (ref 0–0)
POSITIVE DIFF: (no result)
POTASSIUM: 5.3 MMOL/L (ref 3.5–5.1)
RED BLOOD COUNT: 2.76 10^6/UL (ref 4.2–5.4)
RED CELL DISTRIBUTION WIDTH: 14.6 % (ref 11.5–14.5)
SEGMENTED NEUTROPHILS (M) %: 94 % (ref 39–77)
SMUDGE%M: 3 % (ref 0–0)
SODIUM: 138 MMOL/L (ref 135–144)
WHITE BLOOD COUNT: 4.8 10^3/UL (ref 4.8–10.8)

## 2018-11-18 RX ADMIN — METOCLOPRAMIDE HYDROCHLORIDE 1 MG: 10 INJECTION, SOLUTION INTRAMUSCULAR; INTRAVENOUS at 12:22

## 2018-11-18 RX ADMIN — INSULIN HUMAN 1 UNIT: 100 INJECTION, SOLUTION PARENTERAL at 10:23

## 2018-11-18 RX ADMIN — ALBUTEROL SULFATE 1 MG: 2.5 SOLUTION RESPIRATORY (INHALATION) at 09:50

## 2018-11-18 RX ADMIN — PANTOPRAZOLE SODIUM 1 MG: 40 TABLET, DELAYED RELEASE ORAL at 06:21

## 2018-11-18 RX ADMIN — METOCLOPRAMIDE HYDROCHLORIDE 1 MG: 10 INJECTION, SOLUTION INTRAMUSCULAR; INTRAVENOUS at 06:21

## 2018-11-18 RX ADMIN — Medication 1 MG: at 10:25

## 2018-11-18 RX ADMIN — CEFTRIAXONE 1 MLS/HR: 1 INJECTION, SOLUTION INTRAVENOUS at 12:21

## 2018-11-18 RX ADMIN — PREGABALIN 1 MG: 75 CAPSULE ORAL at 21:33

## 2018-11-18 RX ADMIN — PREGABALIN 1 MG: 75 CAPSULE ORAL at 10:24

## 2018-11-18 RX ADMIN — VITAMIN D, TAB 1000IU (100/BT) 1 UNIT: 25 TAB at 10:24

## 2018-11-18 RX ADMIN — AMLODIPINE BESYLATE 1 MG: 5 TABLET ORAL at 10:28

## 2018-11-18 RX ADMIN — DICLOFENAC 1 GM: 10 GEL TOPICAL at 16:44

## 2018-11-18 RX ADMIN — DICLOFENAC 1 GM: 10 GEL TOPICAL at 13:44

## 2018-11-18 RX ADMIN — MULTIPLE VITAMINS W/ MINERALS TAB 1 TAB: TAB at 10:24

## 2018-11-18 RX ADMIN — DICLOFENAC 1 GM: 10 GEL TOPICAL at 10:25

## 2018-11-18 RX ADMIN — OXYMETAZOLINE HYDROCHLORIDE 1 SPRAY: 5 SPRAY NASAL at 12:30

## 2018-11-18 RX ADMIN — LIDOCAINE HYDROCHLORIDE 1 APPLIC: 40 SOLUTION TOPICAL at 12:30

## 2018-11-18 RX ADMIN — DICLOFENAC 1 GM: 10 GEL TOPICAL at 21:34

## 2018-11-19 LAB
ABNORMAL IP MESSAGE: 1
ADD MAN DIFF?: NO
ALBUMIN: 4.2 G/DL (ref 3.3–4.9)
ANION GAP: 12 (ref 5–13)
BASOPHIL #: 0 10^3/UL (ref 0–0.1)
BASOPHILS %: 0.4 % (ref 0–2)
BLOOD UREA NITROGEN: 27 MG/DL (ref 7–20)
CALCIUM: 8.6 MG/DL (ref 8.4–10.2)
CARBON DIOXIDE: 30 MMOL/L (ref 21–31)
CHLORIDE: 99 MMOL/L (ref 97–110)
CREATININE: 3.43 MG/DL (ref 0.44–1)
EOSINOPHILS #: 0 10^3/UL (ref 0–0.5)
EOSINOPHILS %: 0.5 % (ref 0–7)
GLUCOSE: 89 MG/DL (ref 70–220)
HEMATOCRIT: 27.4 % (ref 37–47)
HEMOGLOBIN: 8.4 G/DL (ref 12–16)
IMMATURE GRANS #M: 0.04 10^3/UL (ref 0–0.03)
IMMATURE GRANS % (M): 0.7 % (ref 0–0.43)
LYMPHOCYTES #: 0.4 10^3/UL (ref 0.8–2.9)
LYMPHOCYTES %: 7.5 % (ref 15–51)
MAGNESIUM: 2.1 MG/DL (ref 1.7–2.5)
MEAN CORPUSCULAR HEMOGLOBIN: 31.7 PG (ref 29–33)
MEAN CORPUSCULAR HGB CONC: 30.7 G/DL (ref 32–37)
MEAN CORPUSCULAR VOLUME: 103.4 FL (ref 82–101)
MEAN PLATELET VOLUME: 11 FL (ref 7.4–10.4)
MONOCYTE #: 0.3 10^3/UL (ref 0.3–0.9)
MONOCYTES %: 5.4 % (ref 0–11)
NEUTROPHIL #: 4.8 10^3/UL (ref 1.6–7.5)
NEUTROPHILS %: 85.5 % (ref 39–77)
NUCLEATED RED BLOOD CELLS #: 0 10^3/UL (ref 0–0)
NUCLEATED RED BLOOD CELLS%: 0.5 /100WBC (ref 0–0)
PHOSPHORUS: 5.6 MG/DL (ref 2.5–4.9)
PLATELET COUNT: 81 10^3/UL (ref 140–415)
POSITIVE DIFF: (no result)
POTASSIUM: 4.2 MMOL/L (ref 3.5–5.1)
RED BLOOD COUNT: 2.65 10^6/UL (ref 4.2–5.4)
RED CELL DISTRIBUTION WIDTH: 14.7 % (ref 11.5–14.5)
SODIUM: 141 MMOL/L (ref 135–144)
WHITE BLOOD COUNT: 5.6 10^3/UL (ref 4.8–10.8)

## 2018-11-19 RX ADMIN — PREGABALIN 1 MG: 75 CAPSULE ORAL at 09:57

## 2018-11-19 RX ADMIN — HYDROCODONE BITARTRATE AND ACETAMINOPHEN 1 TAB: 10; 325 TABLET ORAL at 08:38

## 2018-11-19 RX ADMIN — AMLODIPINE BESYLATE 1 MG: 5 TABLET ORAL at 09:35

## 2018-11-19 RX ADMIN — Medication 1 MG: at 09:35

## 2018-11-19 RX ADMIN — MULTIPLE VITAMINS W/ MINERALS TAB 1 TAB: TAB at 09:35

## 2018-11-19 RX ADMIN — PANTOPRAZOLE SODIUM 1 MG: 40 TABLET, DELAYED RELEASE ORAL at 05:54

## 2018-11-19 RX ADMIN — HYDROCODONE BITARTRATE AND ACETAMINOPHEN 1 TAB: 10; 325 TABLET ORAL at 13:21

## 2018-11-19 RX ADMIN — HYDROCODONE BITARTRATE AND ACETAMINOPHEN 1 TAB: 10; 325 TABLET ORAL at 04:23

## 2018-11-19 RX ADMIN — CEFTRIAXONE 1 MLS/HR: 1 INJECTION, SOLUTION INTRAVENOUS at 12:41

## 2018-11-19 RX ADMIN — DICLOFENAC 1 GM: 10 GEL TOPICAL at 09:36

## 2018-11-19 RX ADMIN — VITAMIN D, TAB 1000IU (100/BT) 1 UNIT: 25 TAB at 09:35

## 2018-11-19 RX ADMIN — DICLOFENAC 1 GM: 10 GEL TOPICAL at 13:21

## 2019-04-23 ENCOUNTER — HOSPITAL ENCOUNTER (OUTPATIENT)
Dept: HOSPITAL 10 - E/R | Age: 64
Setting detail: OBSERVATION
LOS: 2 days | Discharge: HOME HEALTH | End: 2019-04-25
Attending: HOSPITALIST | Admitting: INTERNAL MEDICINE
Payer: MEDICARE

## 2019-04-23 ENCOUNTER — HOSPITAL ENCOUNTER (OUTPATIENT)
Dept: HOSPITAL 91 - E/R | Age: 64
Setting detail: OBSERVATION
LOS: 2 days | Discharge: HOME HEALTH | End: 2019-04-25
Payer: MEDICARE

## 2019-04-23 VITALS
HEIGHT: 61 IN | BODY MASS INDEX: 18.31 KG/M2 | BODY MASS INDEX: 18.31 KG/M2 | HEIGHT: 61 IN | WEIGHT: 97 LBS | WEIGHT: 97 LBS

## 2019-04-23 DIAGNOSIS — N18.6: ICD-10-CM

## 2019-04-23 DIAGNOSIS — Z99.2: ICD-10-CM

## 2019-04-23 DIAGNOSIS — M32.9: ICD-10-CM

## 2019-04-23 DIAGNOSIS — E87.0: ICD-10-CM

## 2019-04-23 DIAGNOSIS — Z87.891: ICD-10-CM

## 2019-04-23 DIAGNOSIS — M89.8X9: ICD-10-CM

## 2019-04-23 DIAGNOSIS — D64.9: ICD-10-CM

## 2019-04-23 DIAGNOSIS — J02.9: Primary | ICD-10-CM

## 2019-04-23 DIAGNOSIS — I12.0: ICD-10-CM

## 2019-04-23 DIAGNOSIS — N39.0: ICD-10-CM

## 2019-04-23 DIAGNOSIS — R05: ICD-10-CM

## 2019-04-23 LAB
ADD MAN DIFF?: NO
BASOPHIL #: 0 10^3/UL (ref 0–0.1)
BASOPHILS %: 0.2 % (ref 0–2)
EOSINOPHILS #: 0.2 10^3/UL (ref 0–0.5)
EOSINOPHILS %: 3.6 % (ref 0–7)
HEMATOCRIT: 30.6 % (ref 37–47)
HEMOGLOBIN: 10.1 G/DL (ref 12–16)
IMMATURE GRANS #M: 0.03 10^3/UL (ref 0–0.03)
IMMATURE GRANS % (M): 0.5 % (ref 0–0.43)
LYMPHOCYTES #: 0.9 10^3/UL (ref 0.8–2.9)
LYMPHOCYTES %: 14.8 % (ref 15–51)
MEAN CORPUSCULAR HEMOGLOBIN: 33.8 PG (ref 29–33)
MEAN CORPUSCULAR HGB CONC: 33 G/DL (ref 32–37)
MEAN CORPUSCULAR VOLUME: 102.3 FL (ref 82–101)
MEAN PLATELET VOLUME: 9.4 FL (ref 7.4–10.4)
MONOCYTE #: 0.3 10^3/UL (ref 0.3–0.9)
MONOCYTES %: 5.4 % (ref 0–11)
NEUTROPHIL #: 4.6 10^3/UL (ref 1.6–7.5)
NEUTROPHILS %: 75.5 % (ref 39–77)
NUCLEATED RED BLOOD CELLS #: 0 10^3/UL (ref 0–0)
NUCLEATED RED BLOOD CELLS%: 0 /100WBC (ref 0–0)
PLATELET COUNT: 163 10^3/UL (ref 140–415)
RED BLOOD COUNT: 2.99 10^6/UL (ref 4.2–5.4)
RED CELL DISTRIBUTION WIDTH: 15.1 % (ref 11.5–14.5)
WHITE BLOOD COUNT: 6.1 10^3/UL (ref 4.8–10.8)

## 2019-04-23 PROCEDURE — 80053 COMPREHEN METABOLIC PANEL: CPT

## 2019-04-23 PROCEDURE — 94664 DEMO&/EVAL PT USE INHALER: CPT

## 2019-04-23 PROCEDURE — 99285 EMERGENCY DEPT VISIT HI MDM: CPT

## 2019-04-23 PROCEDURE — 86706 HEP B SURFACE ANTIBODY: CPT

## 2019-04-23 PROCEDURE — 83036 HEMOGLOBIN GLYCOSYLATED A1C: CPT

## 2019-04-23 PROCEDURE — 93005 ELECTROCARDIOGRAM TRACING: CPT

## 2019-04-23 PROCEDURE — 87040 BLOOD CULTURE FOR BACTERIA: CPT

## 2019-04-23 PROCEDURE — 83605 ASSAY OF LACTIC ACID: CPT

## 2019-04-23 PROCEDURE — 96365 THER/PROPH/DIAG IV INF INIT: CPT

## 2019-04-23 PROCEDURE — 97161 PT EVAL LOW COMPLEX 20 MIN: CPT

## 2019-04-23 PROCEDURE — 87340 HEPATITIS B SURFACE AG IA: CPT

## 2019-04-23 PROCEDURE — 85025 COMPLETE CBC W/AUTO DIFF WBC: CPT

## 2019-04-23 PROCEDURE — 81001 URINALYSIS AUTO W/SCOPE: CPT

## 2019-04-23 PROCEDURE — 84484 ASSAY OF TROPONIN QUANT: CPT

## 2019-04-23 PROCEDURE — 71045 X-RAY EXAM CHEST 1 VIEW: CPT

## 2019-04-23 PROCEDURE — 90935 HEMODIALYSIS ONE EVALUATION: CPT

## 2019-04-23 PROCEDURE — 87070 CULTURE OTHR SPECIMN AEROBIC: CPT

## 2019-04-23 PROCEDURE — 85610 PROTHROMBIN TIME: CPT

## 2019-04-23 PROCEDURE — 87086 URINE CULTURE/COLONY COUNT: CPT

## 2019-04-23 PROCEDURE — 83735 ASSAY OF MAGNESIUM: CPT

## 2019-04-23 PROCEDURE — 36415 COLL VENOUS BLD VENIPUNCTURE: CPT

## 2019-04-23 PROCEDURE — 85730 THROMBOPLASTIN TIME PARTIAL: CPT

## 2019-04-24 VITALS — HEART RATE: 66 BPM | DIASTOLIC BLOOD PRESSURE: 68 MMHG | RESPIRATION RATE: 16 BRPM | SYSTOLIC BLOOD PRESSURE: 165 MMHG

## 2019-04-24 VITALS — DIASTOLIC BLOOD PRESSURE: 72 MMHG | SYSTOLIC BLOOD PRESSURE: 140 MMHG | RESPIRATION RATE: 20 BRPM | HEART RATE: 67 BPM

## 2019-04-24 VITALS — RESPIRATION RATE: 18 BRPM | SYSTOLIC BLOOD PRESSURE: 146 MMHG | DIASTOLIC BLOOD PRESSURE: 69 MMHG | HEART RATE: 70 BPM

## 2019-04-24 VITALS — DIASTOLIC BLOOD PRESSURE: 67 MMHG | RESPIRATION RATE: 18 BRPM | SYSTOLIC BLOOD PRESSURE: 152 MMHG | HEART RATE: 67 BPM

## 2019-04-24 LAB
ADD UMIC: YES
ALANINE AMINOTRANSFERASE: 11 IU/L (ref 13–69)
ALBUMIN/GLOBULIN RATIO: 1.28
ALBUMIN: 4.5 G/DL (ref 3.3–4.9)
ALKALINE PHOSPHATASE: 179 IU/L (ref 42–121)
ANION GAP: 13 (ref 5–13)
ASPARTATE AMINO TRANSFERASE: 22 IU/L (ref 15–46)
BILIRUBIN,DIRECT: 0 MG/DL (ref 0–0.2)
BILIRUBIN,TOTAL: 0 MG/DL (ref 0.2–1.3)
BLOOD UREA NITROGEN: 24 MG/DL (ref 7–20)
CALCIUM: 8 MG/DL (ref 8.4–10.2)
CARBON DIOXIDE: 31 MMOL/L (ref 21–31)
CHLORIDE: 91 MMOL/L (ref 97–110)
CREATININE: 3.99 MG/DL (ref 0.44–1)
GLOBULIN: 3.5 G/DL (ref 1.3–3.2)
GLUCOSE: 88 MG/DL (ref 70–220)
INR: 1.11
LACTIC ACID: 1 MMOL/L (ref 0.5–2)
PARTIAL THROMBOPLASTIN TIME: 41.2 SEC (ref 23–35)
POTASSIUM: 4.5 MMOL/L (ref 3.5–5.1)
PROTIME: 14.4 SEC (ref 11.9–14.9)
PT RATIO: 1.1
SODIUM: 135 MMOL/L (ref 135–144)
TOTAL PROTEIN: 8 G/DL (ref 6.1–8.1)
TROPONIN-I: 0.02 NG/ML (ref 0–0.12)
UR ASCORBIC ACID: NEGATIVE MG/DL
UR BACTERIA: (no result) /HPF
UR BILIRUBIN (DIP): NEGATIVE MG/DL
UR BLOOD (DIP): (no result) MG/DL
UR CLARITY: (no result)
UR COLOR: YELLOW
UR GLUCOSE (DIP): (no result) MG/DL
UR KETONES (DIP): NEGATIVE MG/DL
UR LEUKOCYTE ESTERASE (DIP): (no result) LEU/UL
UR NITRITE (DIP): NEGATIVE MG/DL
UR PH (DIP): 8 (ref 5–9)
UR RBC: 6 /HPF (ref 0–5)
UR SPECIFIC GRAVITY (DIP): 1.01 (ref 1–1.03)
UR SQUAMOUS EPITHELIAL CELL: (no result) /HPF
UR TOTAL PROTEIN (DIP): (no result) MG/DL
UR UROBILINOGEN (DIP): NEGATIVE MG/DL
UR WBC: 168 /HPF (ref 0–5)

## 2019-04-24 RX ADMIN — LISINOPRIL 1 MG: 10 TABLET ORAL at 05:51

## 2019-04-24 RX ADMIN — CEFTRIAXONE 1 MLS/HR: 1 INJECTION, SOLUTION INTRAVENOUS at 12:57

## 2019-04-24 RX ADMIN — LORAZEPAM 1 MG: 2 INJECTION, SOLUTION INTRAMUSCULAR; INTRAVENOUS at 13:10

## 2019-04-24 RX ADMIN — LORAZEPAM PRN MG: 2 INJECTION, SOLUTION INTRAMUSCULAR; INTRAVENOUS at 13:10

## 2019-04-24 RX ADMIN — CEFEPIME 1 MLS/HR: 1 INJECTION, POWDER, FOR SOLUTION INTRAMUSCULAR; INTRAVENOUS at 03:00

## 2019-04-24 RX ADMIN — PREGABALIN 1 MG: 75 CAPSULE ORAL at 09:38

## 2019-04-24 RX ADMIN — PREGABALIN 1 MG: 75 CAPSULE ORAL at 21:30

## 2019-04-24 RX ADMIN — HEPARIN SODIUM SCH UNIT: 5000 INJECTION, SOLUTION INTRAVENOUS; SUBCUTANEOUS at 09:00

## 2019-04-24 RX ADMIN — PHENOL PRN LOZENGE: 14.5 LOZENGE ORAL at 12:23

## 2019-04-24 RX ADMIN — HEPARIN SODIUM 1 UNIT: 5000 INJECTION, SOLUTION INTRAVENOUS; SUBCUTANEOUS at 21:00

## 2019-04-24 RX ADMIN — VANCOMYCIN HYDROCHLORIDE 1 MLS/HR: 1 INJECTION, POWDER, LYOPHILIZED, FOR SOLUTION INTRAVENOUS at 03:30

## 2019-04-24 RX ADMIN — AZITHROMYCIN MONOHYDRATE SCH MLS/HR: 500 INJECTION, POWDER, LYOPHILIZED, FOR SOLUTION INTRAVENOUS at 09:38

## 2019-04-24 RX ADMIN — PREGABALIN SCH MG: 75 CAPSULE ORAL at 09:38

## 2019-04-24 RX ADMIN — BENZOCAINE, MENTHOL 1 LOZENGE: 15; 3.6 LOZENGE ORAL at 16:37

## 2019-04-24 RX ADMIN — IPRATROPIUM BROMIDE AND ALBUTEROL SULFATE 1 ML: .5; 3 SOLUTION RESPIRATORY (INHALATION) at 17:18

## 2019-04-24 RX ADMIN — CEFTRIAXONE 1 MLS/HR: 1 INJECTION, SOLUTION INTRAVENOUS at 01:38

## 2019-04-24 RX ADMIN — BENZOCAINE, MENTHOL 1 LOZENGE: 15; 3.6 LOZENGE ORAL at 12:23

## 2019-04-24 RX ADMIN — AZITHROMYCIN MONOHYDRATE 1 MLS/HR: 500 INJECTION, POWDER, LYOPHILIZED, FOR SOLUTION INTRAVENOUS at 09:38

## 2019-04-24 RX ADMIN — LISINOPRIL SCH MG: 10 TABLET ORAL at 05:51

## 2019-04-24 RX ADMIN — CEFTRIAXONE SCH MLS/HR: 1 INJECTION, SOLUTION INTRAVENOUS at 12:57

## 2019-04-24 RX ADMIN — HEPARIN SODIUM SCH UNIT: 5000 INJECTION, SOLUTION INTRAVENOUS; SUBCUTANEOUS at 21:00

## 2019-04-24 RX ADMIN — PHENOL PRN LOZENGE: 14.5 LOZENGE ORAL at 16:37

## 2019-04-24 RX ADMIN — HEPARIN SODIUM 1 UNIT: 5000 INJECTION, SOLUTION INTRAVENOUS; SUBCUTANEOUS at 09:00

## 2019-04-24 RX ADMIN — PREGABALIN SCH MG: 75 CAPSULE ORAL at 21:30

## 2019-04-24 NOTE — PN
Date/Time of Note


Date/Time of Note


DATE: 4/24/19 


TIME: 17:10





Assessment/Plan


VTE Prophylaxis


Risk score (from Ns)>0 risk:  3


[place] [facility] [place]  [place]


SCD contraindicated:  other


Pharmacological prophylaxis:  heparin





Lines/Catheters


IV Catheter Type (from [facility]):  Saline Lock


Urinary Cath still in place:  No





Assessment/Plan


Hospital Course


S: Patient had no acute events overnight, coughing symptoms are relieved with 


Cepastat.  No fevers.





O: Vs- see below


PE:


Constitutional:  alert, oriented, well developed


Head:  normocephalic, atraumatic


Eyes:  EOMI, PERRL


Respiratory:  clear to auscultation, normal air movement


Cardiovascular:  regular rate and rhythm


Gastrointestinal:  soft


Extremities:  normal pulses














Assessment/Plan: 64-year-old female who presents with:





1.  Sore throat and productive cough-Chest x-ray showing signs of likely 


bilateral pneumonia


   -Follow-up results of respiratory culture, throat swab


   -Continue IV antibiotic we will also give patient breathing treatment


   -Cepastat as needed for cough





2.  UTI: UA positive for this


   -Continue for now IV antibiotic, follow-up urine culture results





3.  ESRD on HD (Tue, Thr, Sat), patient with a history of lupus: 


   -We have consulted nephrology for dialysis -likely next session will be 


tomorrow





4.  Hypertensive urgency: Resolved now, BP better controlled.  


   - Continue home meds, adjust as needed





5.  Normocytic anemia: Likely anemia of chronic disease/renal failure


   -Monitor for now








Dispo: Likely home in 24 hours if patient's breathing symptoms improve, patient 


remains afebrile, and patient receives her regularly scheduled dialysis session.


Result Diagram:  


4/23/19 2330                                                                    


           4/23/19 2330





Results 24hrs





Laboratory Tests


Test
                 4/23/19
23:30  4/23/19
23:33  4/24/19
00:55  4/24/19
03:04


White Blood Count             6.1


Red Blood Count             2.99  L


Hemoglobin                 10.1  #L


Hematocrit                  30.6  L


Mean Corpuscular           102.3  H


Volume


Mean Corpuscular            33.8  H


Hemoglobin


Mean Corpuscular            33.0  
  
              
              



Hemoglobin
Concent


Red Cell                    15.1  H


Distribution Width


Platelet Count               163  #


Mean Platelet Volume          9.4


Immature                   0.500  H


Granulocytes %


Neutrophils %                75.5


Lymphocytes %               14.8  L


Monocytes %                   5.4


Eosinophils %                 3.6


Basophils %                   0.2


Nucleated Red Blood           0.0


Cells %


Immature                    0.030


Granulocytes #


Neutrophils #                 4.6


Lymphocytes #                 0.9


Monocytes #                   0.3


Eosinophils #                 0.2


Basophils #                   0.0


Nucleated Red Blood           0.0


Cells #


Prothrombin Time             14.4


Prothrombin Time              1.1


Ratio


INR International           1.11  
  
              
              



Normalized
Ratio


Activated                  41.2  H
  
              
              



Partial
Thromboplast


Time


Sodium Level                  135


Potassium Level               4.5


Chloride Level                91  L


Carbon Dioxide Level           31


Anion Gap                      13


Blood Urea Nitrogen           24  H


Creatinine                  3.99  H


Est Glomerular               11  L
  
              
              



Filtrat Rate
mL/min


Glucose Level                  88


Calcium Level                8.0  L


Total Bilirubin              0.0  L


Direct Bilirubin             0.00


Indirect Bilirubin            0.0


Aspartate Amino               22  
  
              
              



Transf
(AST/SGOT)


Alanine                      11  L
  
              
              



Aminotransferase
(AL


T/SGPT)


Alkaline Phosphatase         179  H


Troponin I                  0.020


Total Protein                 8.0


Albumin                       4.5


Globulin                    3.50  H


Albumin/Globulin             1.28


Ratio


POC Venous Lactate                           0.8                           0.8


Urine Color                                         YELLOW


Urine Clarity                                       CLOUDY  A


Urine pH                                                    8.0


Urine Specific                                            1.013


Gravity


Urine Ketones                                       NEGATIVE


Urine Nitrite                                       NEGATIVE


Urine Bilirubin                                     NEGATIVE


Urine Urobilinogen                                  NEGATIVE


Urine Leukocyte                                             2+  H


Esterase


Urine Microscopic                                            6  H


RBC


Urine Microscopic                                          168  H


WBC


Urine Squamous        
              
              MODERATE  
    



Epithelial
Cells


Urine Bacteria                                      FEW  A


Urine Hemoglobin                                            1+  H


Urine Glucose                                               1+  H


Urine Total Protein                                         3+  H


Test
                 4/24/19
05:29  
              
              



Lactic Acid Level             1.0








Exam/Review of Systems


Exam


Vitals





Vital Signs


  Date      Temp  Pulse  Resp  B/P (MAP)   Pulse Ox  O2          O2 Flow    FiO2


Time                                                 Delivery    Rate


   4/24/19  98.6     67    18      152/67        96


     14:00                           (95)


   4/24/19                                           Room Air


     03:37








Intake and Output





4/23/19 4/23/19 4/24/19





1515:00


23:00


07:00





IntakeIntake Total


350 ml





BalanceBalance


350 ml














Results


Results 24hrs





Laboratory Tests


Test
                 4/23/19
23:30  4/23/19
23:33  4/24/19
00:55  4/24/19
03:04


White Blood Count             6.1


Red Blood Count             2.99  L


Hemoglobin                 10.1  #L


Hematocrit                  30.6  L


Mean Corpuscular           102.3  H


Volume


Mean Corpuscular            33.8  H


Hemoglobin


Mean Corpuscular            33.0  
  
              
              



Hemoglobin
Concent


Red Cell                    15.1  H


Distribution Width


Platelet Count               163  #


Mean Platelet Volume          9.4


Immature                   0.500  H


Granulocytes %


Neutrophils %                75.5


Lymphocytes %               14.8  L


Monocytes %                   5.4


Eosinophils %                 3.6


Basophils %                   0.2


Nucleated Red Blood           0.0


Cells %


Immature                    0.030


Granulocytes #


Neutrophils #                 4.6


Lymphocytes #                 0.9


Monocytes #                   0.3


Eosinophils #                 0.2


Basophils #                   0.0


Nucleated Red Blood           0.0


Cells #


Prothrombin Time             14.4


Prothrombin Time              1.1


Ratio


INR International           1.11  
  
              
              



Normalized
Ratio


Activated                  41.2  H
  
              
              



Partial
Thromboplast


Time


Sodium Level                  135


Potassium Level               4.5


Chloride Level                91  L


Carbon Dioxide Level           31


Anion Gap                      13


Blood Urea Nitrogen           24  H


Creatinine                  3.99  H


Est Glomerular               11  L
  
              
              



Filtrat Rate
mL/min


Glucose Level                  88


Calcium Level                8.0  L


Total Bilirubin              0.0  L


Direct Bilirubin             0.00


Indirect Bilirubin            0.0


Aspartate Amino               22  
  
              
              



Transf
(AST/SGOT)


Alanine                      11  L
  
              
              



Aminotransferase
(AL


T/SGPT)


Alkaline Phosphatase         179  H


Troponin I                  0.020


Total Protein                 8.0


Albumin                       4.5


Globulin                    3.50  H


Albumin/Globulin             1.28


Ratio


POC Venous Lactate                           0.8                           0.8


Urine Color                                         YELLOW


Urine Clarity                                       CLOUDY  A


Urine pH                                                    8.0


Urine Specific                                            1.013


Gravity


Urine Ketones                                       NEGATIVE


Urine Nitrite                                       NEGATIVE


Urine Bilirubin                                     NEGATIVE


Urine Urobilinogen                                  NEGATIVE


Urine Leukocyte                                             2+  H


Esterase


Urine Microscopic                                            6  H


RBC


Urine Microscopic                                          168  H


WBC


Urine Squamous        
              
              MODERATE  
    



Epithelial
Cells


Urine Bacteria                                      FEW  A


Urine Hemoglobin                                            1+  H


Urine Glucose                                               1+  H


Urine Total Protein                                         3+  H


Test
                 4/24/19
05:29  
              
              



Lactic Acid Level             1.0








Medications


Medication





Current Medications


IV Flush (NS 3 ml) 3 ml PER PROTOCOL IV ;  Start 4/24/19 at 05:30


Ondansetron HCl (Zofran Inj) 4 mg Q6H  PRN IV NAUSEA/VOMITING;  Start 4/24/19 at


05:30


Acetaminophen (Tylenol Tab) 650 mg Q6H  PRN PO .PAIN 1-3 OR TEMP;  Start 4/24/19


at 05:30


Acetaminophen/ Hydrocodone Bitart (Norco (5/325)) 1 tab Q6H  PRN PO .PAIN 4-6;  


Start 4/24/19 at 05:30


Acetaminophen/ Hydrocodone Bitart (Norco (5/325)) 2 tab Q6H  PRN PO .PAIN 7-10; 


Start 4/24/19 at 05:30


Heparin Sodium (Porcine) (Heparin  (5000 Units/1ml)) 5,000 unit Q12 SC ;  Start 


4/24/19 at 09:00


Albuterol/ Ipratropium (Duoneb) 3 ml Q2H RESP THERAPY  PRN HHN SHORTNESS OF 


BREATH;  Start 4/24/19 at 05:30


Pregabalin (Lyrica) 75 mg BID PO  Last administered on 4/24/19at 09:38; Admin 


Dose 75 MG;  Start 4/24/19 at 09:00


Lisinopril (Zestril) 10 mg DAILY PO  Last administered on 4/24/19at 05:51; Admin


Dose 10 MG;  Start 4/24/19 at 05:30


Ceftriaxone Sodium 50 ml @  100 mls/hr Q12H IVPB  Last administered on 4/24/19at


12:57; Admin Dose 100 MLS/HR;  Start 4/24/19 at 13:30


Azithromycin 250 ml @  250 mls/hr Q24H IVPB  Last administered on 4/24/19at 


09:38; Admin Dose 250 MLS/HR;  Start 4/24/19 at 08:30


Phenol (Cepastat Lozenge) 1 lozenge Q1H  PRN MT  sore throat and cough  Last 


administered on 4/24/19at 16:37; Admin Dose 1 LOZENGE;  Start 4/24/19 at 12:00


Lorazepam (Ativan) 0.5 mg Q12H  PRN IV ANXIETY Last administered on 4/24/19at 


13:10; Admin Dose 0.5 MG;  Start 4/24/19 at 12:00











MADISON FLORIAN              Apr 24, 2019 17:14

## 2019-04-24 NOTE — ERD
ER Documentation


Chief Complaint


Chief Complaint





sore throat, cough, fever, chills x1wk. hx esrd on hd





HPI


This is a very pleasant 64-year-old female comes in with a cough fever and 


chills for the past week.  Patient has history of end-stage renal disease on 


dialysis.  Cough is mildly productive with yellowish sputum.  No nausea no 


vomiting.  No sick contacts.  No recent travel.  She is Monday Wednesday Friday 


dialysis patient.  She denies any other current complaints.





ROS


All systems reviewed and are negative except as per history of present illness.





Medications


Home Meds


Active Scripts


Levofloxacin* (Levofloxacin*) 500 Mg Tablet, 500 MG PO DAILY, #7 TAB


   Prov:CHRISTIAN CALVO         11/19/18


Docusate Sodium* (Colace*) 100 Mg Capsule, 100 MG PO TID, #30 CAP


   Prov:CAMILA MILAN MD         11/16/18


Hydrocodone/Acetaminophen (Norco 5-325 Tablet) 1 Each Tablet, 1 TAB PO Q6H PRN 


for PAIN, #20 TAB


   Prov:CAMILA MILAN MD         11/16/18


Reported Medications


Epoetin demarco* (Epogen*) 3,000 Unit/1 Ml Vial, 6000 UNITS SC Q TUE,THU,SAT, VIAL


   INJECT AT 5PM


   5/21/18


Heparin Sodium,Porcine/Pf (Heparin 1,000 Unit/10 (100/ml)) 1,000 Unit/10 Ml 


Syringe, 4000 UNIT IV AFTER DIALYSIS


   5/21/18


Simethicone* (Mylicon*) 80 Mg Tab, 80 MG PO DAILY PRN for GAS RELIEVF, TAB


   5/21/18


Pregabalin* (Lyrica*) 75 Mg Capsule, 75 MG PO BID, CAP


   5/21/18


Prednisone* (Prednisone*) 5 Mg Tab, 5 MG PO DAILY, TAB


   5/21/18


Omeprazole* (Omeprazole*) 20 Mg Capsule.dr, 20 MG PO QAM, #30 CAP


   5/21/18


Loperamide Hcl* (Loperamide Hcl*) 2 Mg Cap, 2 MG PO BID, CAP


   5/21/18


Cholecalciferol* (Vitamin D3*) 1,000 Unit Tablet, 1000 UNIT PO DAILY, TAB


   5/21/18


Amlodipine Besylate* (Norvasc*) 5 Mg Tablet, 5 MG PO DAILY, TAB


   HOLD IF SBP<110 & HR<60


   5/21/18


Diclofenac Sodium* (Voltaren* Gel) 1% -100 Gm Gel, 2 GM TOP QID, #1 TUB


   5/21/18


Citalopram Hydrobromide* (Citalopram Hydrobromide*) 20 Mg Tablet, 20 MG PO 


DAILY, #30 TAB


   5/21/18


Cran/Vitc/Mannose/Inulin/Brom (Uti-Stat Liquid) 3,875 Mg/30 Ml Liquid, 30 ML PO 


DAILY


   5/21/18


Cranberry Extract (Cranberry) 425 Mg Capsule, 425 MG PO DAILY, CAP


   5/21/18


Bisacodyl* (Bisacodyl*) 10 Mg Supp, 10 MG IA DAILY, SUPP


   5/21/18


Docusate Sodium* (Colace*) 100 Mg Capsule, 200 MG PO QHS, #30 CAP


   5/21/18


Amino Acids/Protein Hydrolys (PRO-STAT LIQUID) 30 Ml Liquid.pkt, 30 ML PO DAILY


   SUGAR FREE


   5/21/18


Ascorbic Acid (Vitamin C) 500 Mg Tab, 500 MG PO BID, TAB


   5/21/18


Multivitamin with Minerals (Multivitamins with Minerals) 1 Each Tablet, 1 EACH 


PO DAILY, TAB


   5/21/18


Morphine Sulfate* (Ms Contin*) 15 Mg Tablet.sa, 15 MG PO Q12, TAB


   5/21/18


Acetaminophen* (Tylenol*) 325 Mg Tablet, 650 MG PO Q4H PRN for FOR FEVER 100 & 


ABOVE, TAB


   5/21/18


Hydrocodone/Acetaminophen (Norco  Tablet) 1 Each Tablet, 1 EACH PO Q4H for


PAIN 7-9/10, TAB


   5/21/18


Acetaminophen* (Tylenol*) 500 Mg Tab, 1000 MG PO Q6H PRN for PAIN 7-9/10, TAB


   5/21/18


Acetaminophen* (Tylenol*) 325 Mg Tablet, 650 MG PO Q6H PRN for MILD PAIN LEVEL 


1-3, TAB


   5/21/18





Allergies


Allergies:  


Coded Allergies:  


     lidocaine (Unverified  Allergy, Unknown, 5/21/18)





PMhx/Soc


History of Surgery:  Yes (CHOLECYSTECTOMY, ENDOMITRIOSIS, ANGIOGRAM )


Anesthesia Reaction:  No


Hx Neurological Disorder:  No


Hx Respiratory Disorders:  Yes (PNA, BRONCHITIS)


Hx Cardiac Disorders:  Yes (HTN)


Hx Psychiatric Problems:  No


Hx Miscellaneous Medical Probl:  Yes (ESRD on HD, thrombocytopenia, anemia, and 


lupus )


Hx Alcohol Use:  No


Hx Substance Use:  No


Hx Tobacco Use:  Yes


Smoking Status:  Former smoker





Physical Exam


Vitals





Vital Signs


  Date      Temp  Pulse  Resp  B/P (MAP)   Pulse Ox  O2          O2 Flow    FiO2


Time                                                 Delivery    Rate


   4/24/19  98.6     68    10      149/61       100  Room Air


     00:55                           (90)


   4/23/19           75    16      142/65        97  Room Air


     23:22                           (90)


   4/23/19  97.9     77    18      190/76        96


     20:00                          (114)





Physical Exam


Const:   No acute distress


Head:   Atraumatic 


Eyes:    Normal Conjunctiva


ENT:    Normal External Ears, Nose and Mouth.


Neck:               Full range of motion. No meningismus.


Resp:   Clear to auscultation bilaterally


Cardio:   Regular rate and rhythm, no murmurs


Abd:    Soft, non tender, non distended. Normal bowel sounds


Skin:   No petechiae or rashes


Back:   No midline or flank tenderness


Ext:    No cyanosis, or edema


Neur:   Awake and alert


Psych:    Normal Mood and Affect


Result Diagram:  


4/23/19 2330 4/23/19 2330





Results 24 hrs





Laboratory Tests


Test
               4/23/19
23:30  4/23/19
23:33    4/24/19
00:55  4/24/19
03:04


White Blood Count    6.1 10^3/ul


Red Blood Count     2.99 10^6/ul


Hemoglobin             10.1 g/dl


Hematocrit                30.6 %


Mean Corpuscular        102.3 fl


Volume


Mean Corpuscular         33.8 pg


Hemoglobin


Mean Corpuscular      33.0 g/dl 
  
              
                



Hemoglobin
Concen


t


Red Cell                  15.1 %


Distribution


Width


Platelet Count       163 10^3/UL


Mean Platelet             9.4 fl


Volume


Immature                 0.500 %


Granulocytes %


Neutrophils %             75.5 %


Lymphocytes %             14.8 %


Monocytes %                5.4 %


Eosinophils %              3.6 %


Basophils %                0.2 %


Nucleated Red        0.0 /100WBC


Blood Cells %


Immature           0.030 10^3/ul


Granulocytes #


Neutrophils #        4.6 10^3/ul


Lymphocytes #        0.9 10^3/ul


Monocytes #          0.3 10^3/ul


Eosinophils #        0.2 10^3/ul


Basophils #          0.0 10^3/ul


Nucleated Red        0.0 10^3/ul


Blood Cells #


Prothrombin Time        14.4 Sec


Prothrombin Time             1.1


Ratio


INR International          1.11 
  
              
                



Normalized
Ratio


Activated              41.2 Sec 
  
              
                



Partial
Thrombopl


ast Time


Sodium Level          135 mmol/L


Potassium Level       4.5 mmol/L


Chloride Level         91 mmol/L


Carbon Dioxide         31 mmol/L


Level


Anion Gap                     13


Blood Urea              24 mg/dl


Nitrogen


Creatinine            3.99 mg/dl


Est Glomerular        11 mL/min 
  
              
                



Filtrat


Rate
mL/min


Glucose Level           88 mg/dl


Calcium Level          8.0 mg/dl


Total Bilirubin        0.0 mg/dl


Direct Bilirubin      0.00 mg/dl


Indirect               0.0 mg/dl


Bilirubin


Aspartate Amino         22 IU/L 
  
              
                



Transf
(AST/SGOT)


Alanine                 11 IU/L 
  
              
                



Aminotransferase



(ALT/SGPT)


Alkaline                179 IU/L


Phosphatase


Troponin I           0.020 ng/ml


Total Protein           8.0 g/dl


Albumin                 4.5 g/dl


Globulin               3.50 g/dl


Albumin/Globulin            1.28


Ratio


POC Venous                           0.8 mmol/L                      0.8 mmol/L


Lactate


Urine Color                                       YELLOW


Urine Clarity                                     CLOUDY


Urine pH                                                     8.0


Urine Specific                                             1.013


Gravity


Urine Ketones                                     NEGATIVE mg/dL


Urine Nitrite                                     NEGATIVE mg/dL


Urine Bilirubin                                   NEGATIVE mg/dL


Urine                                             NEGATIVE mg/dL


Urobilinogen


Urine Leukocyte                                        2+ Mohsen/ul


Esterase


Urine Microscopic                                         6 /HPF


RBC


Urine Microscopic                                       168 /HPF


WBC


Urine Squamous     
               
              MODERATE /HPF 
  



Epithelial
Cells


Urine Bacteria                                    FEW /HPF


Urine Hemoglobin                                        1+ mg/dL


Urine Glucose                                           1+ mg/dL


Urine Total                                             3+ mg/dl


Protein





Current Medications


 Medications
   Dose
          Sig/Payton
       Start Time
   Status  Last


 (Trade)       Ordered        Route
 PRN     Stop Time              Admin
Dose


                              Reason                                Admin


 Ceftriaxone    50 ml @ 
      ONCE  ONCE
    4/24/19       DC           4/24/19


Sodium         100 mls/hr     IVPB
          01:30
                       01:38



                                             4/24/19 01:59


 Cefepime HCl   50 ml @ 
      ONCE  STAT
    4/24/19       DC           4/24/19


               100 mls/hr     IVPB
          01:56
                       03:00



                                             4/24/19 02:25


 Vancomycin     250 ml @ 
     ONCE  STAT
    4/24/19                



HCl            125 mls/hr     IVPB
          01:56



                                             4/24/19 03:55








Procedures/MDM


EKG: 


Rate/Rhythm:             [Normal Sinus Rhythm]


QRS, ST, T-waves:    [No changes consistent w/ acute ischemia]


Impression:      [No evidence of ischemia or arrhythmia]





Chest X-ray 1V Interpreted by me:


Soft Tissue:                                               No acute 


abnormalities


Bones:                                                    No acute abnormalities


Mediastinum/Cardiac Silhouette/Lungs:     Bilateral lower  lobe infiltrates





Medical decision making: This is a 64-year-old female as well as to be bilateral


lower lobe pneumonia along with a urinary tract infection given her multiple 


source of infection and the fact that she is a dialysis patient, patient was 


started on H CAP pneumonia protocol.  No evidence of sepsis with a normal lactic


acid normal vital signs.  Patient will be admitted to hospitalist for further 


evaluation and management.





Departure


Diagnosis:  


   Primary Impression:  


   Bilateral pneumonia


   Pneumonia type:  due to unspecified organism  Lung location:  lower lobe of 


   lung  Qualified Codes:  J18.1 - Lobar pneumonia, unspecified organism


Condition:  Serious











ERNESTINE CHAVEZ             Apr 24, 2019 03:13

## 2019-04-24 NOTE — HP
Date/Time of Note


Date/Time of Note


DATE: 19 


TIME: 07:57





Assessment/Plan


VTE Prophylaxis


Risk score (from Ns)>0 risk:  3


SCD applied (from Ns):  Yes


Pharmacological prophylaxis:  heparin





Lines/Catheters


IV Catheter Type (from Nrs):  Saline Lock





Assessment/Plan


Assessment/Plan





1.  Sore throat and productive cough


-Chest x-ray was possible bilateral pneumonia


-Respiratory culture, throat swab


-IV antibiotic





2.  UTI: IV antibiotic, follow-up urine culture results





3.  ESRD on HD (Tue, Thr, Sat), patient with a history of lupus: 


-Nephrology for dialysis





4.  Hypertensive urgency: BP better controlled.  Continue home meds, adjust as 


needed





5.  Normocytic anemia: Likely anemia of chronic disease/renal failure


Result Diagram:  


190





Results 24hrs





Laboratory Tests


Test
                 19
23:30  19
23:33  19
00:55  19
03:04


White Blood Count             6.1


Red Blood Count             2.99  L


Hemoglobin                 10.1  #L


Hematocrit                  30.6  L


Mean Corpuscular           102.3  H


Volume


Mean Corpuscular            33.8  H


Hemoglobin


Mean Corpuscular            33.0  
  
              
              



Hemoglobin
Concent


Red Cell                    15.1  H


Distribution Width


Platelet Count               163  #


Mean Platelet Volume          9.4


Immature                   0.500  H


Granulocytes %


Neutrophils %                75.5


Lymphocytes %               14.8  L


Monocytes %                   5.4


Eosinophils %                 3.6


Basophils %                   0.2


Nucleated Red Blood           0.0


Cells %


Immature                    0.030


Granulocytes #


Neutrophils #                 4.6


Lymphocytes #                 0.9


Monocytes #                   0.3


Eosinophils #                 0.2


Basophils #                   0.0


Nucleated Red Blood           0.0


Cells #


Prothrombin Time             14.4


Prothrombin Time              1.1


Ratio


INR International           1.11  
  
              
              



Normalized
Ratio


Activated                  41.2  H
  
              
              



Partial
Thromboplast


Time


Sodium Level                  135


Potassium Level               4.5


Chloride Level                91  L


Carbon Dioxide Level           31


Anion Gap                      13


Blood Urea Nitrogen           24  H


Creatinine                  3.99  H


Est Glomerular               11  L
  
              
              



Filtrat Rate
mL/min


Glucose Level                  88


Calcium Level                8.0  L


Total Bilirubin              0.0  L


Direct Bilirubin             0.00


Indirect Bilirubin            0.0


Aspartate Amino               22  
  
              
              



Transf
(AST/SGOT)


Alanine                      11  L
  
              
              



Aminotransferase
(AL


T/SGPT)


Alkaline Phosphatase         179  H


Troponin I                  0.020


Total Protein                 8.0


Albumin                       4.5


Globulin                    3.50  H


Albumin/Globulin             1.28


Ratio


POC Venous Lactate                           0.8                           0.8


Urine Color                                         YELLOW


Urine Clarity                                       CLOUDY  A


Urine pH                                                    8.0


Urine Specific                                            1.013


Gravity


Urine Ketones                                       NEGATIVE


Urine Nitrite                                       NEGATIVE


Urine Bilirubin                                     NEGATIVE


Urine Urobilinogen                                  NEGATIVE


Urine Leukocyte                                             2+  H


Esterase


Urine Microscopic                                            6  H


RBC


Urine Microscopic                                          168  H


WBC


Urine Squamous        
              
              MODERATE  
    



Epithelial
Cells


Urine Bacteria                                      FEW  A


Urine Hemoglobin                                            1+  H


Urine Glucose                                               1+  H


Urine Total Protein                                         3+  H


Test
                 19
05:29  
              
              



Lactic Acid Level             1.0








HPI/ROS


Admit Date/Time


Admit Date/Time


2019 at 02:28





Hx of Present Illness





This is a 64-year-old female with a history of hypertension, lupus, ESRD on HD 


who presented to ER complaining of sore throat x 1 week.  She also reported a 


productive cough.  When I asked what color her sputum is, she said "it tastes 


like it is green".  She said she has poor vision and as a result I guess she 


relies on her test bud to differentiate colors. 


When she presented to the ER, BP was 190/76.  No fever, WBC WNL.  UA consistent 


with UTI.  Chest x-ray shows the followin. Decreased right lung base air space disease and pleural effusion over 


interval since 2018. 


2. Improved aeration at the left lung base, with decreased air space disease. 


3. Small left pleural effusion is increased over interval.


4. In setting of sepsis findings are concerning for bilateral lung base 


pneumonias, right greater than left.





PMH/Family/Social


Past Medical History


Medical History:  other (See HPI)


Medications





Current Medications


IV Flush (NS 3 ml) 3 ml PER PROTOCOL IV ;  Start 19 at 05:30


Ondansetron HCl (Zofran Inj) 4 mg Q6H  PRN IV NAUSEA/VOMITING;  Start 19 at


05:30


Acetaminophen (Tylenol Tab) 650 mg Q6H  PRN PO .PAIN 1-3 OR TEMP;  Start 19


at 05:30


Acetaminophen/ Hydrocodone Bitart (Norco (5/325)) 1 tab Q6H  PRN PO .PAIN 4-6;  


Start 19 at 05:30


Acetaminophen/ Hydrocodone Bitart (Norco (5/325)) 2 tab Q6H  PRN PO .PAIN 7-10; 


Start 19 at 05:30


Heparin Sodium (Porcine) (Heparin  (5000 Units/1ml)) 5,000 unit Q12 SC ;  Start 


19 at 09:00


Albuterol/ Ipratropium (Duoneb) 3 ml Q2H RESP THERAPY  PRN HHN SHORTNESS OF 


BREATH;  Start 19 at 05:30


Pregabalin (Lyrica) 75 mg BID PO ;  Start 19 at 09:00


Lisinopril (Zestril) 10 mg DAILY PO  Last administered on 19at 05:51; Admin


Dose 10 MG;  Start 19 at 05:30


Coded Allergies:  


     lidocaine (Unverified  Allergy, Unknown, 19)





Past Surgical History


Past Surgical Hx:  other (See HPI)





Family History


Significant Family History:  no pertinent family hx





Social History


Alcohol Use:  none


Smoking Status:  Former smoker


Drug Use:  none





Exam/Review of Systems


Vital Signs


Vitals





Vital Signs


  Date      Temp  Pulse  Resp  B/P (MAP)   Pulse Ox  O2          O2 Flow    FiO2


Time                                                 Delivery    Rate


   19  98.0     66    16      165/68        96


     04:11                          (100)


   19                                           Room Air


     03:37








Intake and Output





19





1515:00


23:00


07:00





IntakeIntake Total


350 ml





BalanceBalance


350 ml














Exam


Constitutional:  alert, oriented, well developed


Head:  normocephalic, atraumatic


Eyes:  EOMI, PERRL


Respiratory:  clear to auscultation, normal air movement


Cardiovascular:  regular rate and rhythm


Gastrointestinal:  soft


Extremities:  normal pulses











ERNESTINE REA MD                 2019 08:03

## 2019-04-24 NOTE — CONS
DATE OF ADMISSION: 04/24/2019

DATE OF CONSULTATION:  04/24/2019

 

 

 

TYPE OF CONSULTATION:  Nephrology.

 

REASON FOR CONSULTATION:  End-stage renal disease.

 

PHYSICIAN REQUESTING CONSULT:  Dr. Florian.

 

HISTORY OF PRESENT ILLNESS:  This is a 64-year-old female with a past medical history of end-stage re
nal disease on dialysis Tuesday, Thursday, Saturday, last hemodialysis was Tuesday.  The patient has 
access AV fistula at left upper extremity.  The patient also has history of hypertension and lupus wh
o presents to [facility] with a cough.  The patient states that she has had green p
hlegm.  The patient also describes difficulty with vision.  The patient upon arrival to the emergency
 room was noted to be hypertensive with a blood pressure of 190/76.  The patient had normal WBC.  The
 patient had urinalysis that showed evidence of pyuria.  Chest x-ray showed decreased right lung airs
pace disease and pleural effusion.  The patient was started on antibiotics and admitted to med/surg f
or evaluation and treatment.

 

In terms of patient's renal history, the patient has underlying end-stage renal disease.  Her primary
 nephrologist is  ____ of [facility].  The patient dialyzes normally 3 hours with approximately 1 
liter of ultrafiltration.

 

PAST MEDICAL HISTORY:  As stated above, history of hypertension, history of end-stage renal disease, 
history of anemia and history of mineral bone disorder.

 

PAST SURGICAL HISTORY:  Status post AV fistula.

 

FAMILY HISTORY:  No family history of kidney disease.

 

SOCIAL HISTORY:  No alcohol or drug use.

 

MEDICATIONS:  The patient medications have been reviewed.

 

REVIEW OF SYSTEMS:  A 14-point review of systems was conducted.  Pertinent positives stated in HPI, o
therwise negative.

 

PHYSICAL EXAMINATION:

VITAL SIGNS:  Blood pressure is 152/67, respiration 18, pulse 67 and temperature 98.6.

HEENT:  Head is normocephalic.

NECK:  Supple.

HEART:  Regular rate.

LUNGS:  Show diminished breath sounds at the base.

ABDOMEN:  Soft, nontender to palpation without rebound or guarding.

EXTREMITIES:  Negative for clubbing, cyanosis, no edema.

DERMATOLOGIC:  No rashes.

MUSCULOSKELETAL:  The patient has noted left upper extremity AV fistula with palpable thrill and brui
t.

NEUROLOGIC:  No focal deficits.  The patient's medications have been reviewed.

 

LABORATORY DATA:  Has been reviewed.

 

IMAGING STUDIES:  Have been reviewed.

 

ASSESSMENT AND PLAN:  This is a 64-year-old female who presents with:

1.  End-stage renal disease.  The patient is on dialysis, Tuesday, Thursday, and Saturday with an acc
ess AV fistula.  ____ hemodialysis tomorrow.  We will dialyze for 3 hours, 2k bath, calcium 2.5, ultr
afiltrate approximately 1 liter.

2.  Anemia.  Monitor hemoglobin and hematocrit levels.  We will give Epogen as needed.

3.  Mineral bone disorder, monitor calcium and phosphorus level.

4.  Hypertension.  Continue current blood pressure regimen.  Continue ultrafiltration with hemodialys
is.

5.  Urinary tract infection.  Continue current antibiotic therapy.

 

Thank you, Dr. Florian, for this interesting consult.  It will be a pleasure to follow patient with you 
throughout the hospital course.

 

 

Dictated By: SIRI BENOIT DO

 

NR/NTS

DD:    04/24/2019 16:26:48

DT:    04/24/2019 22:43:53

Conf#: [id number]

DID#:  [id number]

CC: ERNESTINE REA MD; MADISON FLORIAN;*EndCC*

## 2019-04-25 VITALS — HEART RATE: 66 BPM | SYSTOLIC BLOOD PRESSURE: 136 MMHG | DIASTOLIC BLOOD PRESSURE: 61 MMHG

## 2019-04-25 VITALS — SYSTOLIC BLOOD PRESSURE: 134 MMHG | RESPIRATION RATE: 18 BRPM | DIASTOLIC BLOOD PRESSURE: 51 MMHG | HEART RATE: 61 BPM

## 2019-04-25 VITALS — SYSTOLIC BLOOD PRESSURE: 116 MMHG | HEART RATE: 60 BPM | DIASTOLIC BLOOD PRESSURE: 52 MMHG

## 2019-04-25 VITALS — HEART RATE: 63 BPM | SYSTOLIC BLOOD PRESSURE: 138 MMHG | DIASTOLIC BLOOD PRESSURE: 48 MMHG

## 2019-04-25 VITALS — SYSTOLIC BLOOD PRESSURE: 114 MMHG | DIASTOLIC BLOOD PRESSURE: 53 MMHG | HEART RATE: 65 BPM | RESPIRATION RATE: 20 BRPM

## 2019-04-25 VITALS — HEART RATE: 61 BPM | SYSTOLIC BLOOD PRESSURE: 147 MMHG | DIASTOLIC BLOOD PRESSURE: 51 MMHG

## 2019-04-25 VITALS — SYSTOLIC BLOOD PRESSURE: 130 MMHG | DIASTOLIC BLOOD PRESSURE: 48 MMHG | HEART RATE: 62 BPM

## 2019-04-25 VITALS — DIASTOLIC BLOOD PRESSURE: 53 MMHG | SYSTOLIC BLOOD PRESSURE: 114 MMHG | HEART RATE: 65 BPM

## 2019-04-25 VITALS — DIASTOLIC BLOOD PRESSURE: 53 MMHG | SYSTOLIC BLOOD PRESSURE: 146 MMHG | HEART RATE: 67 BPM

## 2019-04-25 VITALS — SYSTOLIC BLOOD PRESSURE: 159 MMHG | HEART RATE: 70 BPM | DIASTOLIC BLOOD PRESSURE: 70 MMHG | RESPIRATION RATE: 18 BRPM

## 2019-04-25 VITALS — SYSTOLIC BLOOD PRESSURE: 121 MMHG | HEART RATE: 63 BPM | DIASTOLIC BLOOD PRESSURE: 52 MMHG

## 2019-04-25 VITALS — DIASTOLIC BLOOD PRESSURE: 76 MMHG | RESPIRATION RATE: 17 BRPM | HEART RATE: 71 BPM | SYSTOLIC BLOOD PRESSURE: 164 MMHG

## 2019-04-25 VITALS — HEART RATE: 62 BPM | SYSTOLIC BLOOD PRESSURE: 119 MMHG | DIASTOLIC BLOOD PRESSURE: 52 MMHG

## 2019-04-25 VITALS — HEART RATE: 61 BPM | SYSTOLIC BLOOD PRESSURE: 141 MMHG | DIASTOLIC BLOOD PRESSURE: 49 MMHG

## 2019-04-25 VITALS — DIASTOLIC BLOOD PRESSURE: 48 MMHG | HEART RATE: 62 BPM | SYSTOLIC BLOOD PRESSURE: 143 MMHG

## 2019-04-25 VITALS — SYSTOLIC BLOOD PRESSURE: 134 MMHG | DIASTOLIC BLOOD PRESSURE: 53 MMHG | HEART RATE: 61 BPM

## 2019-04-25 VITALS — HEART RATE: 60 BPM | SYSTOLIC BLOOD PRESSURE: 144 MMHG | DIASTOLIC BLOOD PRESSURE: 50 MMHG

## 2019-04-25 LAB
ADD MAN DIFF?: NO
ALANINE AMINOTRANSFERASE: 9 IU/L (ref 13–69)
ALBUMIN/GLOBULIN RATIO: 1.31
ALBUMIN: 3.8 G/DL (ref 3.3–4.9)
ALKALINE PHOSPHATASE: 125 IU/L (ref 42–121)
ANION GAP: 12 (ref 5–13)
ASPARTATE AMINO TRANSFERASE: 18 IU/L (ref 15–46)
BASOPHIL #: 0 10^3/UL (ref 0–0.1)
BASOPHILS %: 0.5 % (ref 0–2)
BILIRUBIN,DIRECT: 0 MG/DL (ref 0–0.2)
BILIRUBIN,TOTAL: 0 MG/DL (ref 0.2–1.3)
BLOOD UREA NITROGEN: 39 MG/DL (ref 7–20)
CALCIUM: 7.4 MG/DL (ref 8.4–10.2)
CARBON DIOXIDE: 26 MMOL/L (ref 21–31)
CHLORIDE: 92 MMOL/L (ref 97–110)
CREATININE: 5.23 MG/DL (ref 0.44–1)
EOSINOPHILS #: 0.3 10^3/UL (ref 0–0.5)
EOSINOPHILS %: 5.9 % (ref 0–7)
GLOBULIN: 2.9 G/DL (ref 1.3–3.2)
GLUCOSE: 86 MG/DL (ref 70–220)
HEMATOCRIT: 27.1 % (ref 37–47)
HEMOGLOBIN A1C: 4.7 % (ref 0–5.9)
HEMOGLOBIN: 8.9 G/DL (ref 12–16)
HEPATITIS B SURFACE ANTIBODY: POSITIVE
HEPATITIS B SURFACE ANTIGEN: NEGATIVE
IMMATURE GRANS #M: 0.02 10^3/UL (ref 0–0.03)
IMMATURE GRANS % (M): 0.5 % (ref 0–0.43)
LYMPHOCYTES #: 0.8 10^3/UL (ref 0.8–2.9)
LYMPHOCYTES %: 17.2 % (ref 15–51)
MAGNESIUM: 2.3 MG/DL (ref 1.7–2.5)
MEAN CORPUSCULAR HEMOGLOBIN: 33.6 PG (ref 29–33)
MEAN CORPUSCULAR HGB CONC: 32.8 G/DL (ref 32–37)
MEAN CORPUSCULAR VOLUME: 102.3 FL (ref 82–101)
MEAN PLATELET VOLUME: 9.4 FL (ref 7.4–10.4)
MONOCYTE #: 0.4 10^3/UL (ref 0.3–0.9)
MONOCYTES %: 8.1 % (ref 0–11)
NEUTROPHIL #: 3 10^3/UL (ref 1.6–7.5)
NEUTROPHILS %: 67.8 % (ref 39–77)
NUCLEATED RED BLOOD CELLS #: 0 10^3/UL (ref 0–0)
NUCLEATED RED BLOOD CELLS%: 0 /100WBC (ref 0–0)
PLATELET COUNT: 105 10^3/UL (ref 140–415)
POTASSIUM: 5.1 MMOL/L (ref 3.5–5.1)
RED BLOOD COUNT: 2.65 10^6/UL (ref 4.2–5.4)
RED CELL DISTRIBUTION WIDTH: 15.5 % (ref 11.5–14.5)
SODIUM: 130 MMOL/L (ref 135–144)
TOTAL PROTEIN: 6.7 G/DL (ref 6.1–8.1)
WHITE BLOOD COUNT: 4.4 10^3/UL (ref 4.8–10.8)

## 2019-04-25 RX ADMIN — HYDROCODONE BITARTRATE AND ACETAMINOPHEN 1 TAB: 5; 325 TABLET ORAL at 02:19

## 2019-04-25 RX ADMIN — HEPARIN SODIUM 1 UNIT: 5000 INJECTION, SOLUTION INTRAVENOUS; SUBCUTANEOUS at 09:00

## 2019-04-25 RX ADMIN — LISINOPRIL 1 MG: 10 TABLET ORAL at 09:09

## 2019-04-25 RX ADMIN — AZITHROMYCIN MONOHYDRATE SCH MLS/HR: 500 INJECTION, POWDER, LYOPHILIZED, FOR SOLUTION INTRAVENOUS at 09:09

## 2019-04-25 RX ADMIN — HYDROCODONE BITARTRATE AND ACETAMINOPHEN PRN TAB: 5; 325 TABLET ORAL at 10:37

## 2019-04-25 RX ADMIN — CEFTRIAXONE 1 MLS/HR: 1 INJECTION, SOLUTION INTRAVENOUS at 01:53

## 2019-04-25 RX ADMIN — BENZOCAINE, MENTHOL 1 LOZENGE: 15; 3.6 LOZENGE ORAL at 02:29

## 2019-04-25 RX ADMIN — PHENOL PRN LOZENGE: 14.5 LOZENGE ORAL at 20:47

## 2019-04-25 RX ADMIN — LORAZEPAM 1 MG: 2 INJECTION, SOLUTION INTRAMUSCULAR; INTRAVENOUS at 14:58

## 2019-04-25 RX ADMIN — CEFTRIAXONE SCH MLS/HR: 1 INJECTION, SOLUTION INTRAVENOUS at 16:07

## 2019-04-25 RX ADMIN — PHENOL PRN LOZENGE: 14.5 LOZENGE ORAL at 21:42

## 2019-04-25 RX ADMIN — HYDROCODONE BITARTRATE AND ACETAMINOPHEN 1 TAB: 5; 325 TABLET ORAL at 10:37

## 2019-04-25 RX ADMIN — BENZOCAINE, MENTHOL 1 LOZENGE: 15; 3.6 LOZENGE ORAL at 21:42

## 2019-04-25 RX ADMIN — LISINOPRIL SCH MG: 10 TABLET ORAL at 09:09

## 2019-04-25 RX ADMIN — HEPARIN SODIUM 1 UNIT: 5000 INJECTION, SOLUTION INTRAVENOUS; SUBCUTANEOUS at 21:00

## 2019-04-25 RX ADMIN — BENZOCAINE, MENTHOL 1 LOZENGE: 15; 3.6 LOZENGE ORAL at 20:47

## 2019-04-25 RX ADMIN — LORAZEPAM PRN MG: 2 INJECTION, SOLUTION INTRAMUSCULAR; INTRAVENOUS at 14:58

## 2019-04-25 RX ADMIN — HEPARIN SODIUM SCH UNIT: 5000 INJECTION, SOLUTION INTRAVENOUS; SUBCUTANEOUS at 09:00

## 2019-04-25 RX ADMIN — PREGABALIN SCH MG: 75 CAPSULE ORAL at 20:28

## 2019-04-25 RX ADMIN — AZITHROMYCIN MONOHYDRATE 1 MLS/HR: 500 INJECTION, POWDER, LYOPHILIZED, FOR SOLUTION INTRAVENOUS at 09:09

## 2019-04-25 RX ADMIN — CEFTRIAXONE 1 MLS/HR: 1 INJECTION, SOLUTION INTRAVENOUS at 16:07

## 2019-04-25 RX ADMIN — HYDROCODONE BITARTRATE AND ACETAMINOPHEN PRN TAB: 5; 325 TABLET ORAL at 20:29

## 2019-04-25 RX ADMIN — PHENOL PRN LOZENGE: 14.5 LOZENGE ORAL at 02:29

## 2019-04-25 RX ADMIN — CEFTRIAXONE SCH MLS/HR: 1 INJECTION, SOLUTION INTRAVENOUS at 01:53

## 2019-04-25 RX ADMIN — PHENOL PRN LOZENGE: 14.5 LOZENGE ORAL at 10:41

## 2019-04-25 RX ADMIN — HEPARIN SODIUM SCH UNIT: 5000 INJECTION, SOLUTION INTRAVENOUS; SUBCUTANEOUS at 21:00

## 2019-04-25 RX ADMIN — ONDANSETRON HYDROCHLORIDE 1 MG: 2 INJECTION, SOLUTION INTRAMUSCULAR; INTRAVENOUS at 10:36

## 2019-04-25 RX ADMIN — PREGABALIN SCH MG: 75 CAPSULE ORAL at 09:09

## 2019-04-25 RX ADMIN — BENZOCAINE, MENTHOL 1 LOZENGE: 15; 3.6 LOZENGE ORAL at 10:41

## 2019-04-25 RX ADMIN — PREGABALIN 1 MG: 75 CAPSULE ORAL at 09:09

## 2019-04-25 RX ADMIN — PREGABALIN 1 MG: 75 CAPSULE ORAL at 20:28

## 2019-04-25 RX ADMIN — HYDROCODONE BITARTRATE AND ACETAMINOPHEN 1 TAB: 5; 325 TABLET ORAL at 20:29

## 2019-04-25 NOTE — PN
DATE:  04/25/2019

 

 

SUBJECTIVE:  The patient is stable, no events overnight.

 

OBJECTIVE:

VITAL SIGNS:  Blood pressure is 164/76, pulse 71, respirations 17, temperature 98.5.

HEENT:  Head is normocephalic.

NECK:  Supple.

HEART:  Regular rate.

LUNGS:  Show diminished breath sounds at the base.

ABDOMEN:  Soft, nontender to palpation without rebound or guarding.

EXTREMITIES:  Negative for clubbing, cyanosis, no edema.

DERMATOLOGIC:  No rashes.

MUSCULOSKELETAL:  No joint effusion.

NEUROLOGIC:  No change in exam.

 

MEDICATIONS:  The patient's medications have been reviewed.

 

LABORATORY DATA:  Has been reviewed.

 

ASSESSMENT AND PLAN:

1.  End-stage renal disease.  The patient will have dialysis today for 3 hours 3k bath, calcium 2.5.

2.  Hypernatremia.  The patient will be dialyzed on 140 sodium bath.

3.  Anemia.  Monitor hemoglobin and hematocrit levels.  Will give Epogen as needed.

4.  Mineral bone disorder, monitor calcium and phosphorus levels.

5.  Hypertension.  Continue current blood pressure regimen.  Continue ultrafiltration with dialysis.

6.  Urinary tract infection.  Continue current antibiotic regimen.

 

 

Dictated By: SIRI BENOIT DO

 

NR/NTS

DD:    04/25/2019 09:01:01

DT:    04/25/2019 09:34:12

Conf#: [id number]

DID#:  [id number]

CC: ERNESTINE REA MD; MADISON FLORIAN;*EndDANIELLE*

## 2019-04-25 NOTE — DS
Date/Time of Note


Date/Time of Note


DATE: 19 


TIME: 13:06





Discharge Summary


Admission/Discharge Info


Admit Date/Time


2019 at 02:28


Discharge Date/Time





Discharge Diagnosis


1.  Sore throat and productive cough-Chest x-ray showing signs of likely bila


teral upper restaurant infection, improving





2.  UTI: UA positive for this-responding to antiviral





3.  ESRD on HD (Tue, Thr, Sat)





4. lupus





5.  Hypertensive urgency: Resolved now, BP better controlled.  





6.  Normocytic anemia: Likely anemia of chronic disease/renal failure


Patient Condition:  Stable


Hx of Present Illness


64-year-old female with a history of hypertension, lupus, ESRD on HD who 


presented to ER complaining of sore throat x 1 week.  She also reported a 


productive cough.  When I asked what color her sputum is, she said "it tastes 


like it is green".  She said she has poor vision and as a result I guess she 


relies on her test bud to differentiate colors. 


When she presented to the ER, BP was 190/76.  No fever, WBC WNL.  UA consistent 


with UTI.  Chest x-ray shows the followin. Decreased right lung base air space disease and pleural effusion over 


interval since 2018. 


2. Improved aeration at the left lung base, with decreased air space disease. 


3. Small left pleural effusion is increased over interval.


4. In setting of sepsis findings are concerning for bilateral lung base 


pneumonias, right greater than left.


Hospital Course


Patient was admitted and started on antibiotic therapy.  Seen by renal team 


during this hospital stay.  Patient continued dialysis as recommended by the 


renal doctor.  Patient tolerated this well.  She was also seen by physical 


therapy team and ambulated well with front wheel walker and also uses wheelchair


at baseline.  Her white blood cell count was stable, and urine culture did show 


mixed growth organisms, but again she responded well to antibiotics.  Her upper 


respiratory infection also improve with Cepastat lozenges and broad-spectrum 


antibiotics.  She was back to baseline status on the day of discharge has vital 


signs are stable, labs are stable, no fevers, tolerating diet.  She will be 


discharged home later today in improved condition and continue dialysis as 


regularly scheduled as an outpatient.  She will also get 7-day course of 


antibiotics to treat both the upper respiratory infection and a urine infection.


 See below for full list of discharge medications.  If there are any further 


growths from her culture results after patient is discharged we will follow-up 


those results and call patient back if she needs different antibiotics as 


necessary.


Home Meds


Active Scripts


Levofloxacin* (Levofloxacin*) 750 Mg Tablet, 750 MG PO DAILY for 7 Days, #7 TAB


   Prov:MADISON FLORIAN S.         19


Benzocaine/Menthol (SORE THROAT LOZENGE) 1 Each Lozenge, 1 LOZENGE MT Q1H PRN 


for  sore throat and cough , #14 LOZENGE


   Prov:BEREKETHIMADISON S.         19


Reported Medications


Pregabalin* (Lyrica*) 75 Mg Capsule, 75 MG PO BID, CAP


   19


Hydrocodone/Acetaminophen (Norco  Tablet) 1 Each Tablet, 1 EACH PO Q4H for


PAIN 7-9/10, TAB


   18


Discontinued Reported Medications


Epoetin demarco* (Epogen*) 3,000 Unit/1 Ml Vial, 6000 UNITS SC Q TUE,THU,SAT, VIAL


   INJECT AT 5PM


   18


Heparin Sodium,Porcine/Pf (Heparin 1,000 Unit/10 (100/ml)) 1,000 Unit/10 Ml 


Syringe, 4000 UNIT IV AFTER DIALYSIS


   18


Simethicone* (Mylicon*) 80 Mg Tab, 80 MG PO DAILY PRN for GAS RELIEVF, TAB


   18


Pregabalin* (Lyrica*) 75 Mg Capsule, 75 MG PO BID, CAP


   18


Prednisone* (Prednisone*) 5 Mg Tab, 5 MG PO DAILY, TAB


   18


Omeprazole* (Omeprazole*) 20 Mg Capsule.dr, 20 MG PO QAM, #30 CAP


   18


Loperamide Hcl* (Loperamide Hcl*) 2 Mg Cap, 2 MG PO BID, CAP


   18


Cholecalciferol* (Vitamin D3*) 1,000 Unit Tablet, 1000 UNIT PO DAILY, TAB


   18


Amlodipine Besylate* (Norvasc*) 5 Mg Tablet, 5 MG PO DAILY, TAB


   HOLD IF SBP<110 & HR<60


   18


Diclofenac Sodium* (Voltaren* Gel) 1% -100 Gm Gel, 2 GM TOP QID, #1 TUB


   18


Citalopram Hydrobromide* (Citalopram Hydrobromide*) 20 Mg Tablet, 20 MG PO 


DAILY, #30 TAB


   18


Cran/Vitc/Mannose/Inulin/Brom (Uti-Stat Liquid) 3,875 Mg/30 Ml Liquid, 30 ML PO 


DAILY


   18


Cranberry Extract (Cranberry) 425 Mg Capsule, 425 MG PO DAILY, CAP


   18


Bisacodyl* (Bisacodyl*) 10 Mg Supp, 10 MG IL DAILY, SUPP


   18


Docusate Sodium* (Colace*) 100 Mg Capsule, 200 MG PO QHS, #30 CAP


   18


Amino Acids/Protein Hydrolys (PRO-STAT LIQUID) 30 Ml Liquid.pkt, 30 ML PO DAILY


   SUGAR FREE


   18


Ascorbic Acid (Vitamin C) 500 Mg Tab, 500 MG PO BID, TAB


   18


Multivitamin with Minerals (Multivitamins with Minerals) 1 Each Tablet, 1 EACH 


PO DAILY, TAB


   18


Morphine Sulfate* (Ms Contin*) 15 Mg Tablet.sa, 15 MG PO Q12, TAB


   18


Acetaminophen* (Tylenol*) 325 Mg Tablet, 650 MG PO Q4H PRN for FOR FEVER 100 & 


ABOVE, TAB


   18


Acetaminophen* (Tylenol*) 500 Mg Tab, 1000 MG PO Q6H PRN for PAIN 7-9/10, TAB


   18


Acetaminophen* (Tylenol*) 325 Mg Tablet, 650 MG PO Q6H PRN for MILD PAIN LEVEL 


1-3, TAB


   18


Discontinued Scripts


Levofloxacin* (Levofloxacin*) 500 Mg Tablet, 500 MG PO DAILY, #7 TAB


   Prov:CHRISTIAN CALVO         18


Docusate Sodium* (Colace*) 100 Mg Capsule, 100 MG PO TID, #30 CAP


   Prov:CAMILA MILAN MD         18


Hydrocodone/Acetaminophen (Norco 5-325 Tablet) 1 Each Tablet, 1 TAB PO Q6H PRN 


for PAIN, #20 TAB


   Prov:CAMILA MILAN MD         18


Follow-up Plan


Please take your medications as prescribed, see your doctor in the clinic in the


next 1 week.


Primary Care Provider


Not On Staff Doctor


Pending Labs





Laboratory Tests


         Test
                                            19
05:53


         White Blood Count
                      4.4 10^3/ul
(4.8-10.8)


         Red Blood Count
                      2.65 10^6/ul
(4.20-5.40)


         Hemoglobin
                               8.9 g/dl
(12.0-16.0)


         Hematocrit
                                 27.1 %
(37.0-47.0)


         Mean Corpuscular Volume
                 102.3 fl
(82.0-101.0)


         Mean Corpuscular Hemoglobin
               33.6 pg
(29.0-33.0)


         Mean Corpuscular Hemoglobin
Concent      32.8 g/dl
(32.0-37.0)


         Red Cell Distribution Width
                15.5 %
(11.5-14.5)


         Platelet Count
                          105 10^3/UL
(140-415)


         Mean Platelet Volume
                        9.4 fl
(7.4-10.4)


         Immature Granulocytes %
                 0.500 %
(0.001-0.429)


         Neutrophils %
                              67.8 %
(39.0-77.0)


         Lymphocytes %
                              17.2 %
(15.0-51.0)


         Monocytes %
                                  8.1 %
(0.0-11.0)


         Eosinophils %
                                 5.9 %
(0.0-7.0)


         Basophils %
                                   0.5 %
(0.0-2.0)


         Nucleated Red Blood Cells %
             0.0 /100WBC
(0.0-0.0)


         Immature Granulocytes #
             0.020 10^3/ul
(0.0-0.031)


         Neutrophils #
                           3.0 10^3/ul
(1.6-7.5)


         Lymphocytes #
                           0.8 10^3/ul
(0.8-2.9)


         Monocytes #
                             0.4 10^3/ul
(0.3-0.9)


         Eosinophils #
                           0.3 10^3/ul
(0.0-0.5)


         Basophils #
                             0.0 10^3/ul
(0.0-0.1)


         Nucleated Red Blood Cells #
             0.0 10^3/ul
(0.0-0.0)


         Sodium Level
                             130 mmol/L
(135-144)


         Potassium Level
                          5.1 mmol/L
(3.5-5.1)


         Chloride Level
                             92 mmol/L
()


         Carbon Dioxide Level
                        26 mmol/L
(21-31)


         Anion Gap                                           12 (5-13)


         Blood Urea Nitrogen
                           39 mg/dl
(7-20)


         Creatinine
                             5.23 mg/dl
(0.44-1.00)


         Est Glomerular Filtrat Rate
mL/min            8 mL/min (>60) 



         Glucose Level
                               86 mg/dl
()


         Hemoglobin A1c                                  4.7 % (0-5.9)


         Calcium Level
                            7.4 mg/dl
(8.4-10.2)


         Magnesium Level
                           2.3 mg/dl
(1.7-2.5)


         Total Bilirubin
                           0.0 mg/dl
(0.2-1.3)


         Direct Bilirubin
                       0.00 mg/dl
(0.00-0.20)


         Indirect Bilirubin
                          0.0 mg/dl
(0-1.1)


         Aspartate Amino Transf
(AST/SGOT)              18 IU/L
(15-46)


         Alanine Aminotransferase
(ALT/SGPT)           9 IU/L (13-69) 



         Alkaline Phosphatase
                        125 IU/L
()


         Total Protein
                              6.7 g/dl
(6.1-8.1)


         Albumin
                                    3.8 g/dl
(3.3-4.9)


         Globulin
                                  2.90 g/dl
(1.3-3.2)


         Albumin/Globulin Ratio                                   1.31





Microbiology


 Date/Time
Source      Procedure
Growth                                 Status



 19 14:23
Throat  Throat Culture - Preliminary
Normal Respiratory  Resulted


                       Cristiana














MADISON FLORIAN              2019 13:10

## 2019-07-18 ENCOUNTER — HOSPITAL ENCOUNTER (INPATIENT)
Dept: HOSPITAL 91 - E/R | Age: 64
LOS: 3 days | Discharge: HOME HEALTH | DRG: 313 | End: 2019-07-21
Payer: MEDICARE

## 2019-07-18 ENCOUNTER — HOSPITAL ENCOUNTER (INPATIENT)
Dept: HOSPITAL 10 - E/R | Age: 64
LOS: 3 days | Discharge: HOME HEALTH | DRG: 313 | End: 2019-07-21
Attending: HOSPITALIST | Admitting: HOSPITALIST
Payer: MEDICARE

## 2019-07-18 VITALS
HEIGHT: 60 IN | BODY MASS INDEX: 21.64 KG/M2 | BODY MASS INDEX: 21.64 KG/M2 | HEIGHT: 60 IN | WEIGHT: 110.23 LBS | WEIGHT: 110.23 LBS

## 2019-07-18 VITALS — HEART RATE: 67 BPM | DIASTOLIC BLOOD PRESSURE: 68 MMHG | SYSTOLIC BLOOD PRESSURE: 154 MMHG | RESPIRATION RATE: 18 BRPM

## 2019-07-18 DIAGNOSIS — I42.9: ICD-10-CM

## 2019-07-18 DIAGNOSIS — Z99.2: ICD-10-CM

## 2019-07-18 DIAGNOSIS — N18.6: ICD-10-CM

## 2019-07-18 DIAGNOSIS — Z87.891: ICD-10-CM

## 2019-07-18 DIAGNOSIS — D63.1: ICD-10-CM

## 2019-07-18 DIAGNOSIS — I13.2: ICD-10-CM

## 2019-07-18 DIAGNOSIS — I50.22: ICD-10-CM

## 2019-07-18 DIAGNOSIS — R07.9: Primary | ICD-10-CM

## 2019-07-18 DIAGNOSIS — M32.9: ICD-10-CM

## 2019-07-18 DIAGNOSIS — D72.819: ICD-10-CM

## 2019-07-18 LAB
ABNORMAL IP MESSAGE: 1
ADD MAN DIFF?: NO
ANION GAP: 13 (ref 5–13)
BASOPHIL #: 0 10^3/UL (ref 0–0.1)
BASOPHILS %: 0.5 % (ref 0–2)
BLOOD UREA NITROGEN: 25 MG/DL (ref 7–20)
CALCIUM: 8.1 MG/DL (ref 8.4–10.2)
CARBON DIOXIDE: 29 MMOL/L (ref 21–31)
CHLORIDE: 96 MMOL/L (ref 97–110)
CK INDEX: 1.9
CK INDEX: 2.1
CK-MB: 2.06 NG/ML (ref 0–2.4)
CK-MB: 2.14 NG/ML (ref 0–2.4)
CREATINE KINASE: 112 IU/L (ref 23–200)
CREATINE KINASE: 99 IU/L (ref 23–200)
CREATININE: 3.19 MG/DL (ref 0.44–1)
EOSINOPHILS #: 0.3 10^3/UL (ref 0–0.5)
EOSINOPHILS %: 8.6 % (ref 0–7)
FREE T4 (FREE THYROXINE): 1.45 NG/DL (ref 0.78–2.44)
GLUCOSE: 150 MG/DL (ref 70–220)
HEMATOCRIT: 33.9 % (ref 37–47)
HEMOGLOBIN: 10.9 G/DL (ref 12–16)
IMMATURE GRANS #M: 0.01 10^3/UL (ref 0–0.03)
IMMATURE GRANS % (M): 0.3 % (ref 0–0.43)
INR: 1.26
LACTIC ACID: 0.9 MMOL/L (ref 0.5–2)
LACTIC ACID: 0.9 MMOL/L (ref 0.5–2)
LIPASE: 64 U/L (ref 23–300)
LYMPHOCYTES #: 0.4 10^3/UL (ref 0.8–2.9)
LYMPHOCYTES %: 9.9 % (ref 15–51)
MEAN CORPUSCULAR HEMOGLOBIN: 31.1 PG (ref 29–33)
MEAN CORPUSCULAR HGB CONC: 32.2 G/DL (ref 32–37)
MEAN CORPUSCULAR VOLUME: 96.9 FL (ref 82–101)
MEAN PLATELET VOLUME: 10.1 FL (ref 7.4–10.4)
MONOCYTE #: 0.3 10^3/UL (ref 0.3–0.9)
MONOCYTES %: 7.8 % (ref 0–11)
NEUTROPHIL #: 2.8 10^3/UL (ref 1.6–7.5)
NEUTROPHILS %: 72.9 % (ref 39–77)
NUCLEATED RED BLOOD CELLS #: 0 10^3/UL (ref 0–0)
NUCLEATED RED BLOOD CELLS%: 0 /100WBC (ref 0–0)
PARTIAL THROMBOPLASTIN TIME: 38 SEC (ref 23–35)
PLATELET COUNT: 145 10^3/UL (ref 140–415)
POSITIVE DIFF: (no result)
POTASSIUM: 4.7 MMOL/L (ref 3.5–5.1)
PROTIME: 15.9 SEC (ref 11.9–14.9)
PT RATIO: 1.2
RED BLOOD COUNT: 3.5 10^6/UL (ref 4.2–5.4)
RED CELL DISTRIBUTION WIDTH: 13.8 % (ref 11.5–14.5)
SODIUM: 138 MMOL/L (ref 135–144)
TROPONIN-I: 0.02 NG/ML (ref 0–0.12)
TROPONIN-I: 0.03 NG/ML (ref 0–0.12)
TROPONIN-I: 0.03 NG/ML (ref 0–0.12)
WHITE BLOOD COUNT: 3.8 10^3/UL (ref 4.8–10.8)

## 2019-07-18 PROCEDURE — 87040 BLOOD CULTURE FOR BACTERIA: CPT

## 2019-07-18 PROCEDURE — 87340 HEPATITIS B SURFACE AG IA: CPT

## 2019-07-18 PROCEDURE — 83690 ASSAY OF LIPASE: CPT

## 2019-07-18 PROCEDURE — 97161 PT EVAL LOW COMPLEX 20 MIN: CPT

## 2019-07-18 PROCEDURE — 84100 ASSAY OF PHOSPHORUS: CPT

## 2019-07-18 PROCEDURE — 36415 COLL VENOUS BLD VENIPUNCTURE: CPT

## 2019-07-18 PROCEDURE — 99285 EMERGENCY DEPT VISIT HI MDM: CPT

## 2019-07-18 PROCEDURE — 71045 X-RAY EXAM CHEST 1 VIEW: CPT

## 2019-07-18 PROCEDURE — 96375 TX/PRO/DX INJ NEW DRUG ADDON: CPT

## 2019-07-18 PROCEDURE — 83036 HEMOGLOBIN GLYCOSYLATED A1C: CPT

## 2019-07-18 PROCEDURE — 84439 ASSAY OF FREE THYROXINE: CPT

## 2019-07-18 PROCEDURE — 82553 CREATINE MB FRACTION: CPT

## 2019-07-18 PROCEDURE — 83735 ASSAY OF MAGNESIUM: CPT

## 2019-07-18 PROCEDURE — 90935 HEMODIALYSIS ONE EVALUATION: CPT

## 2019-07-18 PROCEDURE — 96374 THER/PROPH/DIAG INJ IV PUSH: CPT

## 2019-07-18 PROCEDURE — 85025 COMPLETE CBC W/AUTO DIFF WBC: CPT

## 2019-07-18 PROCEDURE — 93005 ELECTROCARDIOGRAM TRACING: CPT

## 2019-07-18 PROCEDURE — 71250 CT THORAX DX C-: CPT

## 2019-07-18 PROCEDURE — 82550 ASSAY OF CK (CPK): CPT

## 2019-07-18 PROCEDURE — 80048 BASIC METABOLIC PNL TOTAL CA: CPT

## 2019-07-18 PROCEDURE — 84443 ASSAY THYROID STIM HORMONE: CPT

## 2019-07-18 PROCEDURE — 83605 ASSAY OF LACTIC ACID: CPT

## 2019-07-18 PROCEDURE — 80061 LIPID PANEL: CPT

## 2019-07-18 PROCEDURE — 85730 THROMBOPLASTIN TIME PARTIAL: CPT

## 2019-07-18 PROCEDURE — 84484 ASSAY OF TROPONIN QUANT: CPT

## 2019-07-18 PROCEDURE — 93306 TTE W/DOPPLER COMPLETE: CPT

## 2019-07-18 PROCEDURE — 85610 PROTHROMBIN TIME: CPT

## 2019-07-18 RX ADMIN — HEPARIN SODIUM SCH UNIT: 5000 INJECTION, SOLUTION INTRAVENOUS; SUBCUTANEOUS at 21:00

## 2019-07-18 RX ADMIN — PANTOPRAZOLE SODIUM SCH MG: 40 TABLET, DELAYED RELEASE ORAL at 21:30

## 2019-07-18 RX ADMIN — ONDANSETRON HYDROCHLORIDE 1 MG: 2 INJECTION, SOLUTION INTRAMUSCULAR; INTRAVENOUS at 14:27

## 2019-07-18 RX ADMIN — MORPHINE SULFATE 1 MG: 2 INJECTION, SOLUTION INTRAMUSCULAR; INTRAVENOUS at 23:30

## 2019-07-18 RX ADMIN — ASPIRIN 325 MG ORAL TABLET 1 MG: 325 PILL ORAL at 16:32

## 2019-07-18 RX ADMIN — CEFEPIME 1 MLS/HR: 1 INJECTION, POWDER, FOR SOLUTION INTRAMUSCULAR; INTRAVENOUS at 14:27

## 2019-07-18 RX ADMIN — HEPARIN SODIUM 1 UNIT: 5000 INJECTION, SOLUTION INTRAVENOUS; SUBCUTANEOUS at 21:00

## 2019-07-18 RX ADMIN — LOSARTAN POTASSIUM SCH MG: 25 TABLET, FILM COATED ORAL at 22:17

## 2019-07-18 RX ADMIN — PANTOPRAZOLE SODIUM 1 MG: 40 TABLET, DELAYED RELEASE ORAL at 21:30

## 2019-07-18 RX ADMIN — MORPHINE SULFATE PRN MG: 2 INJECTION, SOLUTION INTRAMUSCULAR; INTRAVENOUS at 23:30

## 2019-07-18 RX ADMIN — PREGABALIN 1 MG: 75 CAPSULE ORAL at 22:16

## 2019-07-18 RX ADMIN — VANCOMYCIN HYDROCHLORIDE 1 MLS/HR: 1 INJECTION, POWDER, LYOPHILIZED, FOR SOLUTION INTRAVENOUS at 15:50

## 2019-07-18 RX ADMIN — PREGABALIN SCH MG: 75 CAPSULE ORAL at 22:16

## 2019-07-18 RX ADMIN — LOSARTAN POTASSIUM 1 MG: 25 TABLET ORAL at 22:17

## 2019-07-18 NOTE — HP
Date/Time of Note


Date/Time of Note


DATE: 7/18/19 


TIME: 16:03





Assessment/Plan


VTE Prophylaxis


SCD applied (from Nsg):  No


SCD contraindicated:  other


Pharmacological prophylaxis:  heparin





Lines/Catheters


IV Catheter Type (from Nrsg):  Saline Lock





Assessment/Plan


Hospital Course


Assessment and plan: 64-year-old female past medical history of end-stage renal 


disease on dialysis, lupus, hypertension, prior UTI, who presents with chest 


pain.





# cp: Rule out acute coronary syndrome versus musculoskeletal source.


   -Trend troponins every 6 hours x3, put on high-dose aspirin, morphine, 


oxygen, nitro glycerin as needed


   -Follow-up TSH, A1c lipid panel, order 2D echocardiogram as well


   -Obtain PT eval





#End-stage renal disease: On dialysis


   -Continue dialysis per renal recommendations as will consult on





#History of lupus: Monitor for now





#Hypertension: Blood pressure stable, continue current medications





#GI prophylaxis: Omeprazole


Result Diagram:  


7/18/19 1416                                                                    


           7/18/19 1416





Results 24hrs





Laboratory Tests


       Test
                                7/18/19
14:16  7/18/19
14:20


       White Blood Count                           3.8  L


       Red Blood Count                           3.50  #L


       Hemoglobin                                10.9  #L


       Hematocrit                                33.9  #L


       Mean Corpuscular Volume                     96.9


       Mean Corpuscular Hemoglobin                 31.1


       Mean Corpuscular Hemoglobin
Concent        32.2  
  



       Red Cell Distribution Width                 13.8


       Platelet Count                              145  #


       Mean Platelet Volume                        10.1


       Immature Granulocytes %                    0.300


       Neutrophils %                               72.9


       Lymphocytes %                               9.9  L


       Monocytes %                                  7.8


       Eosinophils %                               8.6  H


       Basophils %                                  0.5


       Nucleated Red Blood Cells %                  0.0


       Immature Granulocytes #                    0.010


       Neutrophils #                                2.8


       Lymphocytes #                               0.4  L


       Monocytes #                                  0.3


       Eosinophils #                                0.3


       Basophils #                                  0.0


       Nucleated Red Blood Cells #                  0.0


       Sodium Level                                 138


       Potassium Level                              4.7


       Chloride Level                               96  L


       Carbon Dioxide Level                          29


       Anion Gap                                     13


       Blood Urea Nitrogen                          25  H


       Creatinine                                 3.19  H


       Est Glomerular Filtrat Rate
mL/min          15  L
  



       Glucose Level                                150


       Calcium Level                               8.1  L


       Troponin I                                 0.021


       POC Venous Lactate                                          1.0








HPI/ROS


Admit Date/Time


Admit Date/Time








Hx of Present Illness


64-year-old female past medical history of end-stage renal disease on dialysis, 


lupus, hypertension, prior UTI, who presents with chest pain.  Patient states 


the symptoms first began about 3 weeks ago and she went to her primary care 


doctor about 2 weeks ago and had chest x-ray performed at that time.  She was 


diagnosed with a small pneumonia and given antibiotics for little over a week.  


She took those antibiotics with minimal improvement in her symptoms until today 


when her chest pain recurred.  She describes the pain occurring near her right 


breast and radiating to her back she denies any trauma to the area no falls she 


said some nausea symptoms for 1 day as well but no vomiting no upper lower GI 


bleeding no shortness of breath no fevers or chills.  When she came in today she


had a chest x-ray that showed:





IMPRESSION:


1.  Moderate right and small left pleural effusions, not significantly changed 


when compared to the prior examination.


2.  Right basilar atelectasis versus pneumonia, also unchanged.


3.  Interval improvement in degree of pulmonary vascular congestion.


4.  Mild cardiomegaly.  





Patient states she has been going to dialysis regularly including her last s


ession being earlier today.  No apparent prior history of any strokes or heart 


attacks in the past.





PMH/Family/Social


Past Medical History


Medications





Current Medications


Vancomycin HCl 250 ml @  125 mls/hr ONCE  STAT IVPB  Last administered on 


7/18/19at 15:50; Admin Dose 125 MLS/HR;  Start 7/18/19 at 14:06;  Stop 7/18/19 


at 16:05


Ondansetron HCl (Zofran Inj) 4 mg ER BRIDGE PRN IV NAUSEA/VOMITING;  Start 


7/18/19 at 16:30;  Stop 7/19/19 at 16:29


Acetaminophen (Tylenol Tab) 650 mg ER BRIDGE PRN PO .MILD PAIN 1-3 OR TEMP;  


Start 7/18/19 at 16:30;  Stop 7/19/19 at 16:29


Coded Allergies:  


     lidocaine (Unverified  Allergy, Unknown, 7/18/19)





Past Surgical History


Past Surgical Hx:  other (AV fistula)





Family History


Significant Family History:  no pertinent family hx





Social History


Alcohol Use:  none


Smoking Status:  Former smoker


Drug Use:  none (AV fistula)





Exam/Review of Systems


Vital Signs


Vitals





Vital Signs


  Date      Temp  Pulse  Resp  B/P (MAP)   Pulse Ox  O2          O2 Flow    FiO2


Time                                                 Delivery    Rate


   7/18/19           74    12      144/63        97  Nasal             2.0


     15:23                           (90)            Cannula


   7/18/19  98.7


     13:37








Exam


Exam


Constitutional:  alert, oriented, well developed


Head:  normocephalic, atraumatic


Eyes:  EOMI, PERRL


Respiratory:  clear to auscultation, normal air movement


Cardiovascular:  regular rate and rhythm, there is some tenderness to sternal 


palpation


Gastrointestinal:  soft


Extremities:  normal pulses


Neuro: No focal deficits











MADISON FLORIAN              Jul 18, 2019 16:06

## 2019-07-18 NOTE — ERD
ER Documentation


Chief Complaint


Chief Complaint





neck , shoulder pain and right breas pain - on dialysis





HPI


Patient is a 64-year-old female with history of hypertension and diabetes who 


presents with pain.  She said that she has pain from her neck to her lower back 


and chest pain.  She said that it is "real bad".  It started 2 to 3 weeks ago 


with right chest pain but was worse today.  She went to her primary doctor 


previously who told her she had a touch of pneumonia and gave her a prescription


for Keflex which she finished today.  She did have full dialysis today.  She 


took Norco for pain.  She has not called her primary doctor as of yet.  Upon 


review of old medical records this is the patient's sixth visit to the ER since 


2017.





ROS


All systems reviewed and are negative except as per history of present illness.





Medications


Home Meds


Reported Medications


Pregabalin* (Lyrica*) 75 Mg Capsule, 75 MG PO BID, CAP


   7/18/19


Patiromer Calcium Sorbitex (Veltassa) 8.4 Gm Powd.pack, 8.4 GM PO BID


   ON NON-DIALYSIS DAYS


   7/18/19


Omeprazole* (Omeprazole*) 40 Mg Capsule.dr, 40 MG PO QHS, #30 CAP


   7/18/19


Losartan Potassium* (Losartan Potassium*) 25 Mg Tablet, 25 MG PO BID, TAB


   7/18/19


Amlodipine Besylate* (Norvasc*) 5 Mg Tablet, 5 MG PO QHS, TAB


   7/18/19


Discontinued Reported Medications


Pregabalin* (Lyrica*) 75 Mg Capsule, 75 MG PO BID, CAP


   4/24/19


Hydrocodone/Acetaminophen (Norco  Tablet) 1 Each Tablet, 1 EACH PO Q4H for


PAIN 7-9/10, TAB


   5/21/18


Discontinued Scripts


Levofloxacin* (Levofloxacin*) 750 Mg Tablet, 750 MG PO DAILY for 7 Days, #7 TAB


   Prov:MADISON FLORIAN S.         4/25/19


Benzocaine/Menthol (SORE THROAT LOZENGE) 1 Each Lozenge, 1 LOZENGE MT Q1H PRN 


for  sore throat and cough , #14 LOZENGE


   Prov:MADISON FLORIAN S.         4/25/19





Allergies


Allergies:  


Coded Allergies:  


     lidocaine (Unverified  Allergy, Unknown, 7/18/19)





PMhx/Soc


History of Surgery:  Yes (cholecystectomy, endometriosis,angiogram)


Anesthesia Reaction:  No


Hx Neurological Disorder:  No


Hx Respiratory Disorders:  Yes (Bronchitis)


Hx Cardiac Disorders:  Yes (HTN)


Hx Psychiatric Problems:  No


Hx Miscellaneous Medical Probl:  Yes (ESRD , ON HD , HTN,  UTI, hx of PNA)


Hx Alcohol Use:  No


Hx Substance Use:  No


Hx Tobacco Use:  Yes


Smoking Status:  Former smoker





FmHx


Family History:  No coronary disease





Physical Exam


Vitals





Vital Signs


  Date      Temp  Pulse  Resp  B/P (MAP)   Pulse Ox  O2          O2 Flow    FiO2


Time                                                 Delivery    Rate


   7/18/19           74    12      144/63        97  Nasal             2.0


     15:23                           (90)            Cannula


   7/18/19                                           Nasal               2


     15:23                                           Cannula


   7/18/19  98.7     81    24      144/66        94


     13:37                           (92)





Physical Exam


Const:   No acute distress


Head:   Atraumatic 


Eyes:    Normal Conjunctiva


ENT:    Normal External Ears, Nose and Mouth.


Neck:               Full range of motion. No meningismus.


Resp:   Clear to auscultation bilaterally


Cardio:   Regular rate and rhythm, no murmurs


Abd:    Soft, non tender, non distended. Normal bowel sounds


Skin:   No petechiae or rashes


Back:   No midline or flank tenderness


Ext:    No cyanosis, or edema


Neur:   Awake and alert


Psych:    Normal Mood and Affect


Result Diagram:  


7/18/19 1416                                                                    


           7/18/19 1416





Results 24 hrs





Laboratory Tests


Test
                 7/18/19
14:16  7/18/19
14:20  7/18/19
16:32  7/18/19
16:33


White Blood Count      3.8 10^3/ul


Red Blood Count       3.50 10^6/ul


Hemoglobin               10.9 g/dl


Hematocrit                  33.9 %


Mean Corpuscular           96.9 fl


Volume


Mean Corpuscular           31.1 pg


Hemoglobin


Mean Corpuscular        32.2 g/dl 
  
              
              



Hemoglobin
Concent


Red Cell                    13.8 %


Distribution Width


Platelet Count         145 10^3/UL


Mean Platelet              10.1 fl


Volume


Immature                   0.300 %


Granulocytes %


Neutrophils %               72.9 %


Lymphocytes %                9.9 %


Monocytes %                  7.8 %


Eosinophils %                8.6 %


Basophils %                  0.5 %


Nucleated Red Blood    0.0 /100WBC


Cells %


Immature             0.010 10^3/ul


Granulocytes #


Neutrophils #          2.8 10^3/ul


Lymphocytes #          0.4 10^3/ul


Monocytes #            0.3 10^3/ul


Eosinophils #          0.3 10^3/ul


Basophils #            0.0 10^3/ul


Nucleated Red Blood    0.0 10^3/ul


Cells #


Sodium Level            138 mmol/L


Potassium Level         4.7 mmol/L


Chloride Level           96 mmol/L


Carbon Dioxide           29 mmol/L


Level


Anion Gap                       13


Blood Urea Nitrogen       25 mg/dl


Creatinine              3.19 mg/dl


Est Glomerular          15 mL/min 
  
              
              



Filtrat Rate
mL/min


Glucose Level            150 mg/dl


Calcium Level            8.1 mg/dl


Troponin I             0.021 ng/ml                                  0.028 ng/ml


POC Venous Lactate                     1.0 mmol/L


Prothrombin Time                                        15.9 Sec


Prothrombin Time                                             1.2


Ratio


INR International    
               
                     1.26 
  



Normalized
Ratio


Activated            
               
                 38.0 Sec 
  



Partial
Thromboplas


t Time


Lactic Acid Level                                                    0.9 mmol/L


Creatine Kinase                                                        112 IU/L


Creatine Kinase                                                             1.9


Index


Creatinine Kinase                                                    2.14 ng/ml


MB (Mass)


Lipase                                                                   64 U/L


Free Thyroxine                                                       1.45 ng/dl





Current Medications


 Medications
   Dose
          Sig/Payton
       Start Time
   Status  Last


 (Trade)       Ordered        Route
 PRN     Stop Time              Admin
Dose


                              Reason                                Admin


 Cefepime HCl   50 ml @ 
      ONCE  STAT
    7/18/19       DC           7/18/19


               100 mls/hr     IVPB
          14:06
                       14:27



                                             7/18/19 14:35


 Vancomycin     250 ml @ 
     ONCE  STAT
    7/18/19       DC           7/18/19


HCl            125 mls/hr     IVPB
          14:06
                       15:50



                                             7/18/19 16:05


 Morphine       4 mg           ONCE  STAT
    7/18/19       DC           7/18/19


Sulfate
                      IV
            14:06
                       14:27



(morphine)                                   7/18/19 14:07


 Ondansetron    4 mg           ONCE  STAT
    7/18/19       DC           7/18/19


HCl
  (Zofran                 IV
            14:06
                       14:27



Inj)                                         7/18/19 14:07


 Aspirin
       325 mg         ONCE  ONCE
    7/18/19       DC           7/18/19


(Aspirin)                     PO
            16:00
                       16:32



                                             7/18/19 16:01


 Ondansetron    4 mg           ER BRIDGE      7/18/19                



HCl
  (Zofran                 PRN
 IV
       16:30



Inj)                          NAUSEA/VOMITI  7/19/19 16:29


                              NG


                650 mg         ER BRIDGE      7/18/19                



Acetaminophen                 PRN
 PO
       16:30




  (Tylenol                   .MILD PAIN     7/19/19 16:29


Tab)                          1-3 OR TEMP


 Losartan       25 mg          BID
 PO
       7/18/19       UNV      



Potassium
                                   21:00



(Cozaar)


 Patiromer
     8.4 gm         BID
 PO
       7/18/19       UNV      



(Veltassa)                                   21:00



 Pregabalin
    75 mg          BID
 PO
       7/18/19       UNV      



(Lyrica)                                     21:00



                40 mg          QHS
 PO
       7/18/19       UNV      



Miscellaneous                                21:00




 Information


 IV Flush
      3 ml           PER            7/18/19       UNV      



(NS 3 ml)                     PROTOCOL
 IV
  16:30



 Ondansetron    4 mg           Q6H  PRN
      7/18/19       UNV      



HCl
  (Zofran                 IV
            16:30



Inj)                          NAUSEA/VOMITI


                              NG


                650 mg         Q6H  PRN
      7/18/19       UNV      



Acetaminophen                 PO
 .PAIN 1-3  16:30




  (Tylenol                   OR TEMP


Tab)


                1 tab          Q6H  PRN
      7/18/19       UNV      



Acetaminophen                 PO
 .MOD PAIN  16:30



/
                            4-6


Hydrocodone


Bitart



(Norco


(5/325))


 Morphine       2 mg           Q4H  PRN
      7/18/19       UNV      



Sulfate
                      IV
 .SEVERE    16:30



(morphine)                    PAIN 7-10


 Docusate       100 mg         Q12H  PRN
     7/18/19       UNV      



Sodium
                       PO
            16:30



(Colace)                      .CONSTIPATION


 Magnesium
     30 ml          DAILY  PRN
    7/18/19       UNV      



Hydroxide
                    PO
            16:30



(Milk Of Mag)                 .CONSTIPATION


 Heparin        5,000 unit     Q12
 SC
       7/18/19       UNV      



Sodium
                                      21:00



(Porcine)



(Heparin


(5000



Units/1ml))


 Lorazepam
     0.5 mg         Q6H  PRN
      7/18/19       UNV      



(Ativan)                      IV
 ANXIETY    16:30



 Albuterol/
    3 ml           Q4H RESP       7/18/19       UNV      



Ipratropium
                  THERAPY  PRN
  16:30



(Duoneb)                      HHN



                              SHORTNESS OF


                              BREATH


 Hydralazine    10 mg          Q6H  PRN
      7/18/19       UNV      



HCl
                          IV
 ELEVATED   16:30



(Apresoline)                  BLOOD


                              PRESSURE


                1 tab          Q5M  PRN
      7/18/19       UNV      



Nitroglycerin                 SL
 ANGINA     16:30







(Nitroglyceri


n
  (Sl Tab)


0.4 Mg)


 Aspirin
       325 mg         DAILY
 PO
     7/19/19       UNV      



(Ecotrin)                                    09:00









Procedures/MDM


EKG read by me: 


Rate/Rhythm: Regular rate and rhythm


Intervals:    Normal


Impression:     No evidence of ischemia or arrhythmia





Chest x-ray read by radiology.





Patient is a 64-year-old female with hypertension and dialysis who presents with


chest pain.  I am concerned about acute coronary syndrome.  I doubt pneumonia, 


pneumothorax, pulmonary embolism, or aortic dissection.  The patient will be 


admitted to the care of the panel team to a telemetry observation bed.  The 


patient was given aspirin.





Departure


Diagnosis:  


   Primary Impression:  


   Chest pain


   Chest pain type:  unspecified  Qualified Codes:  R07.9 - Chest pain, unspe


   cified


Condition:  ANTELMO Gonzalez MD                Jul 18, 2019 18:02

## 2019-07-19 VITALS — HEART RATE: 73 BPM | SYSTOLIC BLOOD PRESSURE: 149 MMHG | RESPIRATION RATE: 18 BRPM | DIASTOLIC BLOOD PRESSURE: 67 MMHG

## 2019-07-19 VITALS — HEART RATE: 75 BPM | DIASTOLIC BLOOD PRESSURE: 62 MMHG | RESPIRATION RATE: 20 BRPM | SYSTOLIC BLOOD PRESSURE: 144 MMHG

## 2019-07-19 VITALS — SYSTOLIC BLOOD PRESSURE: 146 MMHG | DIASTOLIC BLOOD PRESSURE: 66 MMHG | HEART RATE: 69 BPM | RESPIRATION RATE: 20 BRPM

## 2019-07-19 VITALS — HEART RATE: 66 BPM | RESPIRATION RATE: 18 BRPM | DIASTOLIC BLOOD PRESSURE: 61 MMHG | SYSTOLIC BLOOD PRESSURE: 135 MMHG

## 2019-07-19 VITALS — SYSTOLIC BLOOD PRESSURE: 158 MMHG | RESPIRATION RATE: 18 BRPM | HEART RATE: 75 BPM | DIASTOLIC BLOOD PRESSURE: 71 MMHG

## 2019-07-19 VITALS — HEART RATE: 69 BPM | DIASTOLIC BLOOD PRESSURE: 65 MMHG | RESPIRATION RATE: 20 BRPM | SYSTOLIC BLOOD PRESSURE: 140 MMHG

## 2019-07-19 LAB
ABNORMAL IP MESSAGE: 1
ADD MAN DIFF?: NO
ANION GAP: 9 (ref 5–13)
BASOPHIL #: 0 10^3/UL (ref 0–0.1)
BASOPHILS %: 0.7 % (ref 0–2)
BLOOD UREA NITROGEN: 35 MG/DL (ref 7–20)
CALCIUM: 7.6 MG/DL (ref 8.4–10.2)
CARBON DIOXIDE: 31 MMOL/L (ref 21–31)
CHLORIDE: 96 MMOL/L (ref 97–110)
CHOL/HDL RATIO: 1.7 RATIO
CHOLESTEROL: 85 MG/DL (ref 100–200)
CREATININE: 4.36 MG/DL (ref 0.44–1)
EOSINOPHILS #: 0.4 10^3/UL (ref 0–0.5)
EOSINOPHILS %: 13.9 % (ref 0–7)
GLUCOSE: 74 MG/DL (ref 70–220)
HDL CHOLESTEROL: 48 MG/DL (ref 35–98)
HEMATOCRIT: 32.9 % (ref 37–47)
HEMOGLOBIN A1C: 4.9 % (ref 0–5.9)
HEMOGLOBIN: 10.1 G/DL (ref 12–16)
IMMATURE GRANS #M: 0.01 10^3/UL (ref 0–0.03)
IMMATURE GRANS % (M): 0.3 % (ref 0–0.43)
LDL CHOLESTEROL,CALCULATED: 26 MG/DL
LYMPHOCYTES #: 0.6 10^3/UL (ref 0.8–2.9)
LYMPHOCYTES %: 18.9 % (ref 15–51)
MAGNESIUM: 2.2 MG/DL (ref 1.7–2.5)
MEAN CORPUSCULAR HEMOGLOBIN: 31 PG (ref 29–33)
MEAN CORPUSCULAR HGB CONC: 30.7 G/DL (ref 32–37)
MEAN CORPUSCULAR VOLUME: 100.9 FL (ref 82–101)
MEAN PLATELET VOLUME: 10.4 FL (ref 7.4–10.4)
MONOCYTE #: 0.4 10^3/UL (ref 0.3–0.9)
MONOCYTES %: 12.3 % (ref 0–11)
NEUTROPHIL #: 1.6 10^3/UL (ref 1.6–7.5)
NEUTROPHILS %: 53.9 % (ref 39–77)
NUCLEATED RED BLOOD CELLS #: 0 10^3/UL (ref 0–0)
NUCLEATED RED BLOOD CELLS%: 0 /100WBC (ref 0–0)
PHOSPHORUS: 4.6 MG/DL (ref 2.5–4.9)
PLATELET COUNT: 132 10^3/UL (ref 140–415)
POSITIVE DIFF: (no result)
POTASSIUM: 6.3 MMOL/L (ref 3.5–5.1)
RED BLOOD COUNT: 3.26 10^6/UL (ref 4.2–5.4)
RED CELL DISTRIBUTION WIDTH: 14 % (ref 11.5–14.5)
SODIUM: 136 MMOL/L (ref 135–144)
THYROID STIMULATING HORMONE: 3.4 MIU/L (ref 0.47–4.68)
TRIGLYCERIDES: 54 MG/DL (ref 0–149)
WHITE BLOOD COUNT: 3 10^3/UL (ref 4.8–10.8)

## 2019-07-19 RX ADMIN — MORPHINE SULFATE 1 MG: 2 INJECTION, SOLUTION INTRAMUSCULAR; INTRAVENOUS at 21:19

## 2019-07-19 RX ADMIN — HYDROCODONE BITARTRATE AND ACETAMINOPHEN 1 TAB: 5; 325 TABLET ORAL at 06:26

## 2019-07-19 RX ADMIN — PREGABALIN 1 MG: 75 CAPSULE ORAL at 09:02

## 2019-07-19 RX ADMIN — LOSARTAN POTASSIUM 1 MG: 25 TABLET ORAL at 21:09

## 2019-07-19 RX ADMIN — HEPARIN SODIUM 1 UNIT: 5000 INJECTION, SOLUTION INTRAVENOUS; SUBCUTANEOUS at 21:00

## 2019-07-19 RX ADMIN — SODIUM POLYSTYRENE SULFONATE 1 GM: 15 SUSPENSION ORAL; RECTAL at 12:10

## 2019-07-19 RX ADMIN — PANTOPRAZOLE SODIUM 1 MG: 40 TABLET, DELAYED RELEASE ORAL at 06:17

## 2019-07-19 RX ADMIN — PANTOPRAZOLE SODIUM SCH MG: 40 TABLET, DELAYED RELEASE ORAL at 06:17

## 2019-07-19 RX ADMIN — ASPIRIN SCH MG: 325 TABLET, DELAYED RELEASE ORAL at 09:03

## 2019-07-19 RX ADMIN — HEPARIN SODIUM 1 UNIT: 5000 INJECTION, SOLUTION INTRAVENOUS; SUBCUTANEOUS at 09:00

## 2019-07-19 RX ADMIN — HYDROCODONE BITARTRATE AND ACETAMINOPHEN PRN TAB: 5; 325 TABLET ORAL at 06:26

## 2019-07-19 RX ADMIN — LOSARTAN POTASSIUM SCH MG: 25 TABLET, FILM COATED ORAL at 09:03

## 2019-07-19 RX ADMIN — HEPARIN SODIUM SCH UNIT: 5000 INJECTION, SOLUTION INTRAVENOUS; SUBCUTANEOUS at 21:00

## 2019-07-19 RX ADMIN — LOSARTAN POTASSIUM 1 MG: 25 TABLET ORAL at 09:03

## 2019-07-19 RX ADMIN — MORPHINE SULFATE PRN MG: 2 INJECTION, SOLUTION INTRAMUSCULAR; INTRAVENOUS at 21:19

## 2019-07-19 RX ADMIN — PREGABALIN SCH MG: 75 CAPSULE ORAL at 21:08

## 2019-07-19 RX ADMIN — PREGABALIN SCH MG: 75 CAPSULE ORAL at 09:02

## 2019-07-19 RX ADMIN — LOSARTAN POTASSIUM SCH MG: 25 TABLET, FILM COATED ORAL at 21:09

## 2019-07-19 RX ADMIN — PREGABALIN 1 MG: 75 CAPSULE ORAL at 21:08

## 2019-07-19 RX ADMIN — ASPIRIN 1 MG: 325 TABLET, DELAYED RELEASE ORAL at 09:03

## 2019-07-19 RX ADMIN — HEPARIN SODIUM SCH UNIT: 5000 INJECTION, SOLUTION INTRAVENOUS; SUBCUTANEOUS at 09:00

## 2019-07-19 NOTE — QN
Documentation


Comment


As Physician Advisor I have reviewed the chart and have determined that as of 


today, this patient continues to receive medically necessary care required for 


the diagnosis and treatment of illness or injury.  There has been no 


unreasonable delay in the rendering of medically necessary services, and this 


medically necessary care requires a length of stay expected to be greater than 


two midnights.  Additional information gained during the stay now suggests this 


patient should have been classified as an inpatient at the time of admission, 


and I will change the status to inpatient to reflect that medical judgment.





Besides the notes from the medical providers, the following information was used


in this determination:


Congestive heart failure with pulmonary hypertension, renal failure requiring 


dialysis, continued chest pain requiring further imaging including CT.


   


Please call me at [phone] with questions.











KRISTEN FRANKS MD            Jul 19, 2019 16:54

## 2019-07-19 NOTE — PN
Date/Time of Note


Date/Time of Note


DATE: 7/19/19 


TIME: 13:46





Assessment/Plan


VTE Prophylaxis


SCD applied (from Nsg):  No


SCD contraindicated:  other


Pharmacological prophylaxis:  heparin





Lines/Catheters


IV Catheter Type (from Nrs):  Saline Lock


Urinary Cath still in place:  No





Assessment/Plan


Hospital Course


S: Patient has negative troponins.  However still complaining of chest pain.  


Waiting to be seen by renal team





O: VS- see below


PE:


Constitutional:  alert, oriented, well developed


Head:  normocephalic, atraumatic


Eyes:  EOMI, PERRL


Respiratory:  clear to auscultation, normal air movement


Cardiovascular:  regular rate and rhythm, there is some tenderness to sternal 


palpation


Gastrointestinal:  soft


Extremities:  normal pulses


Neuro: No focal deficits








2D echo:


Conclusions:


 Normal left ventricular wall thickness. Moderate enlargement of left 


ventricle cavity. Severe global left ventricular systolic dysfunction. 


Ejection fraction is visually estimated at 30-35 %. Tissue 


Doppler/Mitral Doppler indices are consistent with impaired relaxation 


(Stage I diastolic dysfunction).


 Normal right ventricular systolic function. Mild enlargement of right 


ventricle.


 There is moderate tricuspid regurgitation.


 Small pericardial effusion. Pleural effusion seen.


 The estimated Peak RVSP is 55 mmHg.


 Dilated IVC without respiratory collapse consistent with elevated right 


atrial pressure.








Assessment and plan: 64-year-old female past medical history of end-stage renal 


disease on dialysis, lupus, hypertension, prior UTI, who presents with chest 


pain.





# cp: has ruled out acute coronary syndrome.  Patient does admit to high stress 


episode (big argument with her mother) 48 hours ago, this could be contributing 


to patient's pain symptoms.


   -Monitor, continue current medications


   -Echocardiogram results reviewed, will obtain cardiology consult as well


   -Follow-up PT eval





#End-stage renal disease: On dialysis


   -Continue dialysis per renal recommendations, they have been consulted





#History of lupus: Monitor for now





#Hypertension: Blood pressure stable, continue current medications





#GI prophylaxis: Omeprazole


Result Diagram:  


7/19/19 0544                                                                    


           7/19/19 0544





Results 24hrs





Laboratory Tests


Test
                 7/18/19
14:16  7/18/19
14:20  7/18/19
16:32  7/18/19
16:33


White Blood Count            3.8  L


Red Blood Count            3.50  #L


Hemoglobin                 10.9  #L


Hematocrit                 33.9  #L


Mean Corpuscular             96.9


Volume


Mean Corpuscular             31.1


Hemoglobin


Mean Corpuscular            32.2  
  
              
              



Hemoglobin
Concent


Red Cell                     13.8


Distribution Width


Platelet Count               145  #


Mean Platelet Volume         10.1


Immature                    0.300


Granulocytes %


Neutrophils %                72.9


Lymphocytes %                9.9  L


Monocytes %                   7.8


Eosinophils %                8.6  H


Basophils %                   0.5


Nucleated Red Blood           0.0


Cells %


Immature                    0.010


Granulocytes #


Neutrophils #                 2.8


Lymphocytes #                0.4  L


Monocytes #                   0.3


Eosinophils #                 0.3


Basophils #                   0.0


Nucleated Red Blood           0.0


Cells #


Sodium Level                  138


Potassium Level               4.7


Chloride Level                96  L


Carbon Dioxide Level           29


Anion Gap                      13


Blood Urea Nitrogen           25  H


Creatinine                  3.19  H


Est Glomerular               15  L
  
              
              



Filtrat Rate
mL/min


Glucose Level                 150


Calcium Level                8.1  L


Troponin I                  0.021                                        0.028


POC Venous Lactate                           1.0


Prothrombin Time                                          15.9  H


Prothrombin Time                                            1.2


Ratio


INR International     
              
                    1.26  
  



Normalized
Ratio


Activated             
              
                   38.0  H
  



Partial
Thromboplast


Time


Lactic Acid Level                                                          0.9


Creatine Kinase                                                            112


Creatine Kinase                                                            1.9


Index


Creatinine Kinase MB                                                      2.14


(Mass)


Lipase                                                                      64


Free Thyroxine                                                            1.45


Test
                 7/18/19
22:03  7/19/19
05:44  
              



Lactic Acid Level             0.9


Creatine Kinase                99


Creatine Kinase               2.1


Index


Creatinine Kinase MB         2.06


(Mass)


Troponin I                  0.025


White Blood Count                          3.0  #L


Red Blood Count                            3.26  L


Hemoglobin                                 10.1  L


Hematocrit                                 32.9  L


Mean Corpuscular                           100.9


Volume


Mean Corpuscular                            31.0


Hemoglobin


Mean Corpuscular      
                   30.7  L
  
              



Hemoglobin
Concent


Red Cell                                    14.0


Distribution Width


Platelet Count                              132  L


Mean Platelet Volume                        10.4


Immature                                   0.300


Granulocytes %


Neutrophils %                               53.9


Lymphocytes %                               18.9


Monocytes %                                12.3  H


Eosinophils %                              13.9  H


Basophils %                                  0.7


Nucleated Red Blood                          0.0


Cells %


Immature                                   0.010


Granulocytes #


Neutrophils #                                1.6


Lymphocytes #                               0.6  L


Monocytes #                                  0.4


Eosinophils #                                0.4


Basophils #                                  0.0


Nucleated Red Blood                          0.0


Cells #


Sodium Level                                 136


Potassium Level                            6.3  *H


Chloride Level                               96  L


Carbon Dioxide Level                          31


Anion Gap                                      9


Blood Urea Nitrogen                          35  H


Creatinine                                4.36  #H


Est Glomerular        
                     10  L
  
              



Filtrat Rate
mL/min


Glucose Level                                74  #


Hemoglobin A1c                               4.9


Calcium Level                               7.6  L


Phosphorus Level                             4.6


Magnesium Level                              2.2


Triglycerides Level                           54


Cholesterol Level                            85  L


LDL Cholesterol,                              26


Calculated


HDL Cholesterol                               48


Cholesterol/HDL                              1.7


Ratio


Thyroid Stimulating   
                   3.400  
  
              



Hormone
(TSH)








Exam/Review of Systems


Exam


Vitals





Vital Signs


  Date      Temp  Pulse  Resp  B/P (MAP)   Pulse Ox  O2          O2 Flow    FiO2


Time                                                 Delivery    Rate


   7/19/19  98.0     69    20      140/65        98  Nasal


     11:11                           (90)            Cannula


   7/19/19                                                             2.0


     01:29








Intake and Output





7/18/19 7/18/19 7/19/19





1515:00


23:00


07:00





IntakeIntake Total


150 ml





BalanceBalance


150 ml














Results


Results 24hrs





Laboratory Tests


Test
                 7/18/19
14:16  7/18/19
14:20  7/18/19
16:32  7/18/19
16:33


White Blood Count            3.8  L


Red Blood Count            3.50  #L


Hemoglobin                 10.9  #L


Hematocrit                 33.9  #L


Mean Corpuscular             96.9


Volume


Mean Corpuscular             31.1


Hemoglobin


Mean Corpuscular            32.2  
  
              
              



Hemoglobin
Concent


Red Cell                     13.8


Distribution Width


Platelet Count               145  #


Mean Platelet Volume         10.1


Immature                    0.300


Granulocytes %


Neutrophils %                72.9


Lymphocytes %                9.9  L


Monocytes %                   7.8


Eosinophils %                8.6  H


Basophils %                   0.5


Nucleated Red Blood           0.0


Cells %


Immature                    0.010


Granulocytes #


Neutrophils #                 2.8


Lymphocytes #                0.4  L


Monocytes #                   0.3


Eosinophils #                 0.3


Basophils #                   0.0


Nucleated Red Blood           0.0


Cells #


Sodium Level                  138


Potassium Level               4.7


Chloride Level                96  L


Carbon Dioxide Level           29


Anion Gap                      13


Blood Urea Nitrogen           25  H


Creatinine                  3.19  H


Est Glomerular               15  L
  
              
              



Filtrat Rate
mL/min


Glucose Level                 150


Calcium Level                8.1  L


Troponin I                  0.021                                        0.028


POC Venous Lactate                           1.0


Prothrombin Time                                          15.9  H


Prothrombin Time                                            1.2


Ratio


INR International     
              
                    1.26  
  



Normalized
Ratio


Activated             
              
                   38.0  H
  



Partial
Thromboplast


Time


Lactic Acid Level                                                          0.9


Creatine Kinase                                                            112


Creatine Kinase                                                            1.9


Index


Creatinine Kinase MB                                                      2.14


(Mass)


Lipase                                                                      64


Free Thyroxine                                                            1.45


Test
                 7/18/19
22:03  7/19/19
05:44  
              



Lactic Acid Level             0.9


Creatine Kinase                99


Creatine Kinase               2.1


Index


Creatinine Kinase MB         2.06


(Mass)


Troponin I                  0.025


White Blood Count                          3.0  #L


Red Blood Count                            3.26  L


Hemoglobin                                 10.1  L


Hematocrit                                 32.9  L


Mean Corpuscular                           100.9


Volume


Mean Corpuscular                            31.0


Hemoglobin


Mean Corpuscular      
                   30.7  L
  
              



Hemoglobin
Concent


Red Cell                                    14.0


Distribution Width


Platelet Count                              132  L


Mean Platelet Volume                        10.4


Immature                                   0.300


Granulocytes %


Neutrophils %                               53.9


Lymphocytes %                               18.9


Monocytes %                                12.3  H


Eosinophils %                              13.9  H


Basophils %                                  0.7


Nucleated Red Blood                          0.0


Cells %


Immature                                   0.010


Granulocytes #


Neutrophils #                                1.6


Lymphocytes #                               0.6  L


Monocytes #                                  0.4


Eosinophils #                                0.4


Basophils #                                  0.0


Nucleated Red Blood                          0.0


Cells #


Sodium Level                                 136


Potassium Level                            6.3  *H


Chloride Level                               96  L


Carbon Dioxide Level                          31


Anion Gap                                      9


Blood Urea Nitrogen                          35  H


Creatinine                                4.36  #H


Est Glomerular        
                     10  L
  
              



Filtrat Rate
mL/min


Glucose Level                                74  #


Hemoglobin A1c                               4.9


Calcium Level                               7.6  L


Phosphorus Level                             4.6


Magnesium Level                              2.2


Triglycerides Level                           54


Cholesterol Level                            85  L


LDL Cholesterol,                              26


Calculated


HDL Cholesterol                               48


Cholesterol/HDL                              1.7


Ratio


Thyroid Stimulating   
                   3.400  
  
              



Hormone
(TSH)








Medications


Medication





Current Medications


Losartan Potassium (Cozaar) 25 mg BID PO  Last administered on 7/19/19at 09:03; 


Admin Dose 25 MG;  Start 7/18/19 at 21:00


Pregabalin (Lyrica) 75 mg BID PO  Last administered on 7/19/19at 09:02; Admin 


Dose 75 MG;  Start 7/18/19 at 21:00


IV Flush (NS 3 ml) 3 ml PER PROTOCOL IV ;  Start 7/18/19 at 16:30


Ondansetron HCl (Zofran Inj) 4 mg Q6H  PRN IV NAUSEA/VOMITING;  Start 7/18/19 at


16:30


Acetaminophen (Tylenol Tab) 650 mg Q6H  PRN PO .PAIN 1-3 OR TEMP;  Start 7/18/19


at 16:30


Acetaminophen/ Hydrocodone Bitart (Norco (5/325)) 1 tab Q6H  PRN PO .MOD PAIN 4-


6 Last administered on 7/19/19at 06:26; Admin Dose 1 TAB;  Start 7/18/19 at 16:3


0


Morphine Sulfate (morphine) 2 mg Q4H  PRN IV .SEVERE PAIN 7-10 Last administered


on 7/18/19at 23:30; Admin Dose 2 MG;  Start 7/18/19 at 16:30


Docusate Sodium (Colace) 100 mg Q12H  PRN PO .CONSTIPATION;  Start 7/18/19 at 


16:30


Magnesium Hydroxide (Milk Of Mag) 30 ml DAILY  PRN PO .CONSTIPATION;  Start 


7/18/19 at 16:30


Heparin Sodium (Porcine) (Heparin  (5000 Units/1ml)) 5,000 unit Q12 SC ;  Start 


7/18/19 at 21:00


Lorazepam (Ativan) 0.5 mg Q6H  PRN IV ANXIETY;  Start 7/18/19 at 16:30


Albuterol/ Ipratropium (Duoneb) 3 ml Q4H RESP THERAPY  PRN HHN SHORTNESS OF 


BREATH;  Start 7/18/19 at 16:30


Hydralazine HCl (Apresoline) 10 mg Q6H  PRN IV ELEVATED BLOOD PRESSURE;  Start 


7/18/19 at 16:30


Nitroglycerin (Nitroglycerin (Sl Tab) 0.4 Mg) 1 tab Q5M  PRN SL ANGINA;  Start 


7/18/19 at 16:30


Aspirin (Ecotrin) 325 mg DAILY PO  Last administered on 7/19/19at 09:03; Admin 


Dose 325 MG;  Start 7/19/19 at 09:00


Pantoprazole (Protonix Tab) 40 mg DAILY@06 PO  Last administered on 7/19/19at 


06:17; Admin Dose 40 MG;  Start 7/18/19 at 21:30


Non-Formulary Medication 0.5 ea BID PO ;  Start 7/18/19 at 22:00











MADISON FLORIAN              Jul 19, 2019 13:48

## 2019-07-19 NOTE — RADRPT
Echocardiogram Report

 

 

Patient Name: Debi CERVANTES ID: [id number]

: 1955 (64y 4m)Study Date: 2019 7:26:41 AM

Gender: FAccession #: [id number]

Tech: ADALBERTOShan Fuentes [facility]                        Location: 51         

Ref.Physician: MADISON FLORIAN            Height(Cm):            

 

BSA: Weight(Kg):

Quality: AdequateOrder Physician: MADISON FLORIAN

Account #:

 

 

Procedures:

Echocardiographic Report:

Transthoracic echocardiogram with complete 2D, M-Mode, and doppler 

examination.

 

Indications:

Chest Pain.

 

 

Measurements:

2D/M Mode                                          Doppler                                           
    

Measurement    Value    Normal Range               Measurement      Value    Normal Range            
    

LVIDd 2D       5.9      [ 3.8 - 5.2 ] cm           AV Peak Dhruv      1.6      [ 100.0 - 170.0 ] cm/sec
    

LVIDs 2D       4.9      [ 2.2 - 3.5 ] cm           AV Peak PG       10.0     [ 2.0 - 9.0 ] mmHg      
    

LVPWd 2D       0.8      [ 0.6 - 0.9 ] cm           LVOT Peak Dhruv    0.9      [ 70.0 - 110.0 ] cm/sec 
    

IVSd 2D        0.8      [ 0.6 - 0.9 ] cm           LVOT Peak PG     3.0      [ 2.0 - 6.0 ] mmHg      
    

AoR Diam 2D    2.4      [ 2.3 - 3.1 ] cm           MV E Peak Dhruv    1.0      [ 60.0 - 130.0 ] cm/sec 
    

EDV 2D         174.0    [ 46.0 - 106.0 ] ml        MV A Peak Dhruv    1.1      [ 100.0 - 120.0 ] cm/sec
    

ESV 2D         111.0    [ 14.0 - 42.0 ] ml         MV E/A           0.9      [ 0.8 - 1.5 ] ratio     
    

EF 2D          36.2     [ 54.0 - 74.0 ] percent    MV Decel Time    176      [ 104 - 258 ] msec      
    

LA Dimen 2D    3.8      [ 2.7 - 3.8 ] cm           Lat E` Dhruv       0.0      [ 10.0 - 15.0 ] cm/sec  
    

                                                   Lateral E/E`     20.3     [ 1.0 - 2.0 ] ratio     
    

                                                   Med E` Dhruv       0.0      cm/sec                  
    

                                                   MV E/A           0.9      [ 0.8 - 1.5 ] ratio     
    

                                                   TR Peak Dhruv      3.2      [ 100.0 - 280.0 ] cm/sec
    

                                                   TR Peak PG       40.0     mmHg                    
    

                                                   RVSP             48.0     [ 10.0 - 36.0 ] mmHg    
    

                                                   RA Pressure      8.0      mmHg                    
    

 

Findings:

Left Ventricle:

Normal left ventricular wall thickness. Moderate enlargement of left 

ventricle cavity. Severe global left ventricular systolic dysfunction. 

Ejection fraction is visually estimated at 30-35 %. Tissue 

Doppler/Mitral Doppler indices are consistent with impaired relaxation 

(Stage I diastolic dysfunction).

Right Ventricle:

Normal right ventricular systolic function. Mild enlargement of right 

ventricle.

Left Atrium:

There is moderate enlargement of left atrium.

Right Atrium:

There is moderate enlargement of right atrium.

Mitral Valve:

Mild mitral valve regurgitation.

Aortic Valve:

Normal appearance of the aortic valve. No significant aortic stenosis or 

insufficiency.

Tricuspid Valve:

Normal appearance of the tricuspid valve. The estimated Peak RVSP is 55 

mmHg. There is moderate tricuspid regurgitation.

Pulmonic Valve:

Normal pulmonic valve appearance. There is mild pulmonic regurgitation.

Pericardium:

Small pericardial effusion. Pleural effusion seen.

Aorta:

Normal aortic root.

IVC:

Dilated IVC without respiratory collapse consistent with elevated right 

atrial pressure.

 

 

Conclusions:

 Normal left ventricular wall thickness. Moderate enlargement of left 

ventricle cavity. Severe global left ventricular systolic dysfunction. 

Ejection fraction is visually estimated at 30-35 %. Tissue 

Doppler/Mitral Doppler indices are consistent with impaired relaxation 

(Stage I diastolic dysfunction).

 Normal right ventricular systolic function. Mild enlargement of right 

ventricle.

 There is moderate tricuspid regurgitation.

 Small pericardial effusion. Pleural effusion seen.

 The estimated Peak RVSP is 55 mmHg.

 Dilated IVC without respiratory collapse consistent with elevated right 

atrial pressure.

 

Electronically Signed By:

 

Luke Bergeron

2019 12:23:29 PDT

## 2019-07-19 NOTE — CONS
DATE OF ADMISSION: 07/18/2019

DATE OF CONSULTATION:  07/19/2019

 

 

 

HISTORY OF PRESENT ILLNESS:  The patient is a 64-year-old female with past medical history of end-sta
ge renal disease on hemodialysis, lupus, hypertension, prior UTI, presented to the hospital after not
icing some chest pain.  The patient has been on dialysis on Tuesday, Thursday, Saturday schedule.  He
r last dialysis was yesterday, which she did without complication.  She dialyzes through a left upper
 arm fistula.  She has been on dialysis for 8 years.  She had a biopsy at the time of dialysis and it
 was thought that she had diabetes related nephrosclerosis, although her diabetic history was very mi
nimal at which point, she has been on dialysis since.  She has had evaluations for cardiac conditions
 in the past without any consequences.  She makes minimal urine.  She denies fevers, chills, nausea, 
vomiting, chest pain, shortness of at this time.  Chest pain earlier that resolved.

 

PAST MEDICAL HISTORY:   Significant for the above.

 

MEDICATIONS:  From home include:

1.  Lyrica.

2.  Veltassa.

3.  Omeprazole.

4.  Losartan.

5.  Amlodipine.

 

ALLERGIES:  THE PATIENT HAS ALLERGIES TO LIDOCAINE.

 

SOCIAL HISTORY:  Does not smoke, drink or do drugs.

 

FAMILY HISTORY:  No history of kidney disease.

 

REVIEW OF SYSTEMS:  A 14-point review of systems attempted and negative.

 

PHYSICAL EXAMINATION:

VITAL SIGNS:  We see temperature 98, blood pressure 140/65.

HEENT:  Normocephalic, atraumatic.  Pupils equal, round and reactive to light.  Oropharynx is moist.

NECK:  Supple.

HEART:  Regular rate and rhythm.

LUNGS:  Clear to auscultation.

ABDOMEN:  Soft, nontender, nondistended.  Bowel sounds are present.

 

LABORATORY EVALUATION:  Shows a sodium 136, potassium 6.3, BUN 35, creatinine 4.36.  UA is reviewed o
n microscopy.

 

IMAGING:  Chest x-ray reviewed by radiologist.

 

IMPRESSION:

1.  End-stage renal disease.  Will dialyze off schedule today as patient has hyperkalemia.  Dialysis 
will be ordered _____will follow up again, likely underlying diabetic nephrosclerosis.  Will monitor.


2.  Chest pain.  Workup is underway.  Consider cardiology evaluation.

3.  History of lupus with unclear diagnostic criteria on no medicines.

4.  Hypertension.  Continue regular medications.

5.  Anemia of chronic kidney disease.  At this time, patient has pancytopenia.  The patient _____ as 
an outpatient.  The patient had a bad REACTION TO ERYTHROPOIETIN, so will not order any here.

76.  Leukopenia.  Consider workup, may be related to the autoimmune disease.

 

 

Dictated By: RAJIV AHMADI MD

 

DF/NTS

DD:    07/19/2019 15:07:10

DT:    07/19/2019 16:03:48

Conf#: [id number]

DID#:  [id number]

CC: MADISON FLORIAN;*EndCC*

## 2019-07-20 VITALS — HEART RATE: 78 BPM | SYSTOLIC BLOOD PRESSURE: 156 MMHG | DIASTOLIC BLOOD PRESSURE: 69 MMHG

## 2019-07-20 VITALS — DIASTOLIC BLOOD PRESSURE: 54 MMHG | SYSTOLIC BLOOD PRESSURE: 140 MMHG | HEART RATE: 63 BPM

## 2019-07-20 VITALS — SYSTOLIC BLOOD PRESSURE: 141 MMHG | HEART RATE: 67 BPM | DIASTOLIC BLOOD PRESSURE: 57 MMHG

## 2019-07-20 VITALS — DIASTOLIC BLOOD PRESSURE: 67 MMHG | SYSTOLIC BLOOD PRESSURE: 148 MMHG | RESPIRATION RATE: 18 BRPM

## 2019-07-20 VITALS — DIASTOLIC BLOOD PRESSURE: 53 MMHG | SYSTOLIC BLOOD PRESSURE: 141 MMHG | HEART RATE: 72 BPM

## 2019-07-20 VITALS — SYSTOLIC BLOOD PRESSURE: 138 MMHG | HEART RATE: 71 BPM | DIASTOLIC BLOOD PRESSURE: 62 MMHG

## 2019-07-20 VITALS — SYSTOLIC BLOOD PRESSURE: 135 MMHG | HEART RATE: 75 BPM | RESPIRATION RATE: 16 BRPM | DIASTOLIC BLOOD PRESSURE: 62 MMHG

## 2019-07-20 VITALS — DIASTOLIC BLOOD PRESSURE: 65 MMHG | SYSTOLIC BLOOD PRESSURE: 150 MMHG | HEART RATE: 67 BPM

## 2019-07-20 VITALS — SYSTOLIC BLOOD PRESSURE: 138 MMHG | HEART RATE: 77 BPM | DIASTOLIC BLOOD PRESSURE: 66 MMHG

## 2019-07-20 VITALS — DIASTOLIC BLOOD PRESSURE: 56 MMHG | SYSTOLIC BLOOD PRESSURE: 147 MMHG | HEART RATE: 65 BPM

## 2019-07-20 VITALS — RESPIRATION RATE: 20 BRPM | SYSTOLIC BLOOD PRESSURE: 159 MMHG | HEART RATE: 79 BPM | DIASTOLIC BLOOD PRESSURE: 69 MMHG

## 2019-07-20 VITALS — HEART RATE: 74 BPM | SYSTOLIC BLOOD PRESSURE: 131 MMHG | DIASTOLIC BLOOD PRESSURE: 61 MMHG | RESPIRATION RATE: 20 BRPM

## 2019-07-20 VITALS — DIASTOLIC BLOOD PRESSURE: 63 MMHG | SYSTOLIC BLOOD PRESSURE: 149 MMHG | HEART RATE: 68 BPM

## 2019-07-20 VITALS — DIASTOLIC BLOOD PRESSURE: 57 MMHG | SYSTOLIC BLOOD PRESSURE: 141 MMHG | RESPIRATION RATE: 18 BRPM | HEART RATE: 67 BPM

## 2019-07-20 VITALS — HEART RATE: 66 BPM | DIASTOLIC BLOOD PRESSURE: 57 MMHG | SYSTOLIC BLOOD PRESSURE: 144 MMHG

## 2019-07-20 VITALS — DIASTOLIC BLOOD PRESSURE: 67 MMHG | RESPIRATION RATE: 20 BRPM | HEART RATE: 85 BPM | SYSTOLIC BLOOD PRESSURE: 148 MMHG

## 2019-07-20 VITALS — DIASTOLIC BLOOD PRESSURE: 90 MMHG | HEART RATE: 66 BPM | RESPIRATION RATE: 18 BRPM | SYSTOLIC BLOOD PRESSURE: 122 MMHG

## 2019-07-20 VITALS — DIASTOLIC BLOOD PRESSURE: 53 MMHG | SYSTOLIC BLOOD PRESSURE: 133 MMHG | HEART RATE: 73 BPM

## 2019-07-20 VITALS — RESPIRATION RATE: 20 BRPM | HEART RATE: 76 BPM | DIASTOLIC BLOOD PRESSURE: 71 MMHG | SYSTOLIC BLOOD PRESSURE: 158 MMHG

## 2019-07-20 VITALS — HEART RATE: 60 BPM | SYSTOLIC BLOOD PRESSURE: 133 MMHG | DIASTOLIC BLOOD PRESSURE: 50 MMHG

## 2019-07-20 VITALS — DIASTOLIC BLOOD PRESSURE: 51 MMHG | SYSTOLIC BLOOD PRESSURE: 131 MMHG | HEART RATE: 61 BPM

## 2019-07-20 LAB
ABNORMAL IP MESSAGE: 1
ADD MAN DIFF?: NO
ANION GAP: 12 (ref 5–13)
BASOPHIL #: 0 10^3/UL (ref 0–0.1)
BASOPHILS %: 0.7 % (ref 0–2)
BLOOD UREA NITROGEN: 52 MG/DL (ref 7–20)
CALCIUM: 7.4 MG/DL (ref 8.4–10.2)
CARBON DIOXIDE: 29 MMOL/L (ref 21–31)
CHLORIDE: 93 MMOL/L (ref 97–110)
CREATININE: 5.8 MG/DL (ref 0.44–1)
EOSINOPHILS #: 0.3 10^3/UL (ref 0–0.5)
EOSINOPHILS %: 6.8 % (ref 0–7)
GLUCOSE: 99 MG/DL (ref 70–220)
HEMATOCRIT: 32.8 % (ref 37–47)
HEMOGLOBIN: 10 G/DL (ref 12–16)
HEPATITIS B SURFACE ANTIGEN: NEGATIVE
IMMATURE GRANS #M: 0.01 10^3/UL (ref 0–0.03)
IMMATURE GRANS % (M): 0.2 % (ref 0–0.43)
LYMPHOCYTES #: 0.5 10^3/UL (ref 0.8–2.9)
LYMPHOCYTES %: 11.4 % (ref 15–51)
MEAN CORPUSCULAR HEMOGLOBIN: 30.6 PG (ref 29–33)
MEAN CORPUSCULAR HGB CONC: 30.5 G/DL (ref 32–37)
MEAN CORPUSCULAR VOLUME: 100.3 FL (ref 82–101)
MEAN PLATELET VOLUME: 10.4 FL (ref 7.4–10.4)
MONOCYTE #: 0.4 10^3/UL (ref 0.3–0.9)
MONOCYTES %: 10.2 % (ref 0–11)
NEUTROPHIL #: 2.9 10^3/UL (ref 1.6–7.5)
NEUTROPHILS %: 70.7 % (ref 39–77)
NUCLEATED RED BLOOD CELLS #: 0 10^3/UL (ref 0–0)
NUCLEATED RED BLOOD CELLS%: 0 /100WBC (ref 0–0)
PLATELET COUNT: 145 10^3/UL (ref 140–415)
POSITIVE DIFF: (no result)
POTASSIUM: 5.8 MMOL/L (ref 3.5–5.1)
RED BLOOD COUNT: 3.27 10^6/UL (ref 4.2–5.4)
RED CELL DISTRIBUTION WIDTH: 13.8 % (ref 11.5–14.5)
SODIUM: 134 MMOL/L (ref 135–144)
WHITE BLOOD COUNT: 4.1 10^3/UL (ref 4.8–10.8)

## 2019-07-20 PROCEDURE — 5A1D70Z PERFORMANCE OF URINARY FILTRATION, INTERMITTENT, LESS THAN 6 HOURS PER DAY: ICD-10-PCS

## 2019-07-20 RX ADMIN — HYDROCODONE BITARTRATE AND ACETAMINOPHEN 1 TAB: 5; 325 TABLET ORAL at 15:22

## 2019-07-20 RX ADMIN — PREGABALIN SCH MG: 75 CAPSULE ORAL at 20:10

## 2019-07-20 RX ADMIN — LOSARTAN POTASSIUM SCH MG: 25 TABLET, FILM COATED ORAL at 08:21

## 2019-07-20 RX ADMIN — ASPIRIN 1 MG: 325 TABLET, DELAYED RELEASE ORAL at 08:21

## 2019-07-20 RX ADMIN — HYDROCODONE BITARTRATE AND ACETAMINOPHEN PRN TAB: 5; 325 TABLET ORAL at 23:50

## 2019-07-20 RX ADMIN — MORPHINE SULFATE 1 MG: 2 INJECTION, SOLUTION INTRAMUSCULAR; INTRAVENOUS at 08:22

## 2019-07-20 RX ADMIN — LOSARTAN POTASSIUM 1 MG: 25 TABLET ORAL at 20:10

## 2019-07-20 RX ADMIN — HEPARIN SODIUM SCH UNIT: 5000 INJECTION, SOLUTION INTRAVENOUS; SUBCUTANEOUS at 08:22

## 2019-07-20 RX ADMIN — LOSARTAN POTASSIUM 1 MG: 25 TABLET ORAL at 08:21

## 2019-07-20 RX ADMIN — MORPHINE SULFATE PRN MG: 2 INJECTION, SOLUTION INTRAMUSCULAR; INTRAVENOUS at 08:22

## 2019-07-20 RX ADMIN — PREGABALIN 1 MG: 75 CAPSULE ORAL at 20:10

## 2019-07-20 RX ADMIN — HEPARIN SODIUM 1 UNIT: 5000 INJECTION, SOLUTION INTRAVENOUS; SUBCUTANEOUS at 20:12

## 2019-07-20 RX ADMIN — HEPARIN SODIUM SCH UNIT: 5000 INJECTION, SOLUTION INTRAVENOUS; SUBCUTANEOUS at 20:12

## 2019-07-20 RX ADMIN — PREGABALIN 1 MG: 75 CAPSULE ORAL at 08:28

## 2019-07-20 RX ADMIN — HYDROCODONE BITARTRATE AND ACETAMINOPHEN 1 TAB: 5; 325 TABLET ORAL at 23:50

## 2019-07-20 RX ADMIN — PANTOPRAZOLE SODIUM 1 MG: 40 TABLET, DELAYED RELEASE ORAL at 05:30

## 2019-07-20 RX ADMIN — LOSARTAN POTASSIUM SCH MG: 25 TABLET, FILM COATED ORAL at 20:10

## 2019-07-20 RX ADMIN — PREGABALIN SCH MG: 75 CAPSULE ORAL at 08:28

## 2019-07-20 RX ADMIN — HYDROCODONE BITARTRATE AND ACETAMINOPHEN PRN TAB: 5; 325 TABLET ORAL at 15:22

## 2019-07-20 RX ADMIN — ASPIRIN SCH MG: 325 TABLET, DELAYED RELEASE ORAL at 08:21

## 2019-07-20 RX ADMIN — HEPARIN SODIUM 1 UNIT: 5000 INJECTION, SOLUTION INTRAVENOUS; SUBCUTANEOUS at 08:22

## 2019-07-20 RX ADMIN — PANTOPRAZOLE SODIUM SCH MG: 40 TABLET, DELAYED RELEASE ORAL at 05:30

## 2019-07-20 NOTE — PN
Date/Time of Note


Date/Time of Note


DATE: 7/20/19 


TIME: 10:09





Assessment/Plan


VTE Prophylaxis


Risk score (from Ns)>0 risk:  4


SCD applied (from Ns):  No


SCD contraindicated:  other


Pharmacological prophylaxis:  heparin





Lines/Catheters


IV Catheter Type (from [facility]):  Saline Lock


Urinary Cath still in place:  No





Assessment/Plan


Hospital Course


S: Patient still having some chest pain complaints.  Presently undergoing 


dialysis.  Had CT chest performed yesterday, results still pending.  





O: VS- see below


PE:


Constitutional:  alert, oriented, well developed


Head:  normocephalic, atraumatic


Eyes:  EOMI, PERRL


Respiratory:  clear to auscultation, normal air movement


Cardiovascular:  regular rate and rhythm, there is some tenderness to sternal 


palpation


Gastrointestinal:  soft


Extremities:  normal pulses


Neuro: No focal deficits








2D echo:


Conclusions:


 Normal left ventricular wall thickness. Moderate enlargement of left 


ventricle cavity. Severe global left ventricular systolic dysfunction. 


Ejection fraction is visually estimated at 30-35 %. Tissue 


Doppler/Mitral Doppler indices are consistent with impaired relaxation 


(Stage I diastolic dysfunction).


 Normal right ventricular systolic function. Mild enlargement of right 


ventricle.


 There is moderate tricuspid regurgitation.


 Small pericardial effusion. Pleural effusion seen.


 The estimated Peak RVSP is 55 mmHg.


 Dilated IVC without respiratory collapse consistent with elevated right 


atrial pressure.








Assessment and plan: 64-year-old female past medical history of end-stage renal 


disease on dialysis, lupus, hypertension, prior UTI, who presents with chest 


pain.





# cp: has ruled out acute coronary syndrome.  Patient does admit to high stress 


episode (big argument with her mother) 48 hours prior to admission, this could 


be contributing to patient's pain symptoms.


   -Monitor, continue current medications


   -Echocardiogram results reviewed, follow-up recognitions from cardiology 


consult as well


   -Follow-up PT eval


   -Follow-up results of CT chest which was performed yesterday apparently.





#End-stage renal disease: On dialysis


   -Continue dialysis per renal recommendations





#History of lupus: Monitor for now





#Hypertension: Blood pressure stable, continue current medications





#GI prophylaxis: Omeprazole


Result Diagram:  


7/20/19 0557                                                                    


           7/20/19 0556





Results 24hrs





Laboratory Tests


Test
                                7/20/19
05:54  7/20/19
05:56  7/20/19
05:57


Hepatitis B Surface Antigen          NEGATIVE


Sodium Level                                               134  L


Potassium Level                                            5.8  H


Chloride Level                                              93  L


Carbon Dioxide Level                                         29


Anion Gap                                                    12


Blood Urea Nitrogen                                         52  H


Creatinine                                                5.80  H


Est Glomerular Filtrat Rate
mL/min   
                      7  L
  



Glucose Level                                                99


Calcium Level                                              7.4  L


White Blood Count                                                        4.1  #L


Red Blood Count                                                          3.27  L


Hemoglobin                                                               10.0  L


Hematocrit                                                               32.8  L


Mean Corpuscular Volume                                                  100.3


Mean Corpuscular Hemoglobin                                               30.6


Mean Corpuscular Hemoglobin
Concent  
              
                   30.5  L



Red Cell Distribution Width                                               13.8


Platelet Count                                                             145


Mean Platelet Volume                                                      10.4


Immature Granulocytes %                                                  0.200


Neutrophils %                                                             70.7


Lymphocytes %                                                            11.4  L


Monocytes %                                                               10.2


Eosinophils %                                                              6.8


Basophils %                                                                0.7


Nucleated Red Blood Cells %                                                0.0


Immature Granulocytes #                                                  0.010


Neutrophils #                                                              2.9


Lymphocytes #                                                             0.5  L


Monocytes #                                                                0.4


Eosinophils #                                                              0.3


Basophils #                                                                0.0


Nucleated Red Blood Cells #                                                0.0








Exam/Review of Systems


Exam


Vitals





Vital Signs


  Date      Temp  Pulse  Resp  B/P (MAP)   Pulse Ox  O2          O2 Flow    FiO2


Time                                                 Delivery    Rate


   7/20/19  98.0     85    20      148/67        91  Nasal


     07:23                           (94)            Cannula


   7/20/19                                                             2.0


     01:42








Intake and Output





7/19/19 7/19/19 7/20/19





1515:00


23:00


07:00





IntakeIntake Total


760 ml


350 ml





OutputOutput Total


2 ml





BalanceBalance


758 ml


350 ml














Results


Results 24hrs





Laboratory Tests


Test
                                7/20/19
05:54  7/20/19
05:56  7/20/19
05:57


Hepatitis B Surface Antigen          NEGATIVE


Sodium Level                                               134  L


Potassium Level                                            5.8  H


Chloride Level                                              93  L


Carbon Dioxide Level                                         29


Anion Gap                                                    12


Blood Urea Nitrogen                                         52  H


Creatinine                                                5.80  H


Est Glomerular Filtrat Rate
mL/min   
                      7  L
  



Glucose Level                                                99


Calcium Level                                              7.4  L


White Blood Count                                                        4.1  #L


Red Blood Count                                                          3.27  L


Hemoglobin                                                               10.0  L


Hematocrit                                                               32.8  L


Mean Corpuscular Volume                                                  100.3


Mean Corpuscular Hemoglobin                                               30.6


Mean Corpuscular Hemoglobin
Concent  
              
                   30.5  L



Red Cell Distribution Width                                               13.8


Platelet Count                                                             145


Mean Platelet Volume                                                      10.4


Immature Granulocytes %                                                  0.200


Neutrophils %                                                             70.7


Lymphocytes %                                                            11.4  L


Monocytes %                                                               10.2


Eosinophils %                                                              6.8


Basophils %                                                                0.7


Nucleated Red Blood Cells %                                                0.0


Immature Granulocytes #                                                  0.010


Neutrophils #                                                              2.9


Lymphocytes #                                                             0.5  L


Monocytes #                                                                0.4


Eosinophils #                                                              0.3


Basophils #                                                                0.0


Nucleated Red Blood Cells #                                                0.0








Medications


Medication





Current Medications


Losartan Potassium (Cozaar) 25 mg BID PO  Last administered on 7/20/19at 08:21; 


Admin Dose 25 MG;  Start 7/18/19 at 21:00


Pregabalin (Lyrica) 75 mg BID PO  Last administered on 7/20/19at 08:28; Admin 


Dose 75 MG;  Start 7/18/19 at 21:00


IV Flush (NS 3 ml) 3 ml PER PROTOCOL IV ;  Start 7/18/19 at 16:30


Ondansetron HCl (Zofran Inj) 4 mg Q6H  PRN IV NAUSEA/VOMITING;  Start 7/18/19 at


16:30


Acetaminophen (Tylenol Tab) 650 mg Q6H  PRN PO .PAIN 1-3 OR TEMP;  Start 7/18/19


at 16:30


Acetaminophen/ Hydrocodone Bitart (Norco (5/325)) 1 tab Q6H  PRN PO .MOD PAIN 4-


6 Last administered on 7/19/19at 06:26; Admin Dose 1 TAB;  Start 7/18/19 at 


16:30


Morphine Sulfate (morphine) 2 mg Q4H  PRN IV .SEVERE PAIN 7-10 Last administered


on 7/20/19at 08:22; Admin Dose 2 MG;  Start 7/18/19 at 16:30


Docusate Sodium (Colace) 100 mg Q12H  PRN PO .CONSTIPATION;  Start 7/18/19 at 


16:30


Magnesium Hydroxide (Milk Of Mag) 30 ml DAILY  PRN PO .CONSTIPATION;  Start 


7/18/19 at 16:30


Heparin Sodium (Porcine) (Heparin  (5000 Units/1ml)) 5,000 unit Q12 SC ;  Start 


7/18/19 at 21:00


Lorazepam (Ativan) 0.5 mg Q6H  PRN IV ANXIETY;  Start 7/18/19 at 16:30


Albuterol/ Ipratropium (Duoneb) 3 ml Q4H RESP THERAPY  PRN HHN SHORTNESS OF 


BREATH;  Start 7/18/19 at 16:30


Hydralazine HCl (Apresoline) 10 mg Q6H  PRN IV ELEVATED BLOOD PRESSURE;  Start 


7/18/19 at 16:30


Nitroglycerin (Nitroglycerin (Sl Tab) 0.4 Mg) 1 tab Q5M  PRN SL ANGINA;  Start 


7/18/19 at 16:30


Aspirin (Ecotrin) 325 mg DAILY PO  Last administered on 7/20/19at 08:21; Admin 


Dose 325 MG;  Start 7/19/19 at 09:00


Pantoprazole (Protonix Tab) 40 mg DAILY@06 PO  Last administered on 7/20/19at 


05:30; Admin Dose 40 MG;  Start 7/18/19 at 21:30


Non-Formulary Medication 0.5 ea BID PO  Last administered on 7/20/19at 08:21; 


Admin Dose 0.5 EA;  Start 7/18/19 at 22:00











MADISON FLORIAN              Jul 20, 2019 10:10

## 2019-07-20 NOTE — CONS
Consult Date/Type/Reason


Admit Date/Time


Jul 19, 2019 at 16:51


Initial Consult Date





Date/Time of Note


DATE: 7/20/19 


TIME: 08:57





Subjective


64-year-old female with past medical history of end-stage renal disease on 


hemodialysis, lupus, hypertension, prior UTI, presented to the hospital after 


noticing some chest pain.  The patient has been on dialysis on Tuesday, 


Thursday, Saturday schedule.  Her last dialysis was yesterday, which she did 


without complication.  She dialyzes through a left upper arm fistula.  She has 


been on dialysis for 8 years.  She had a biopsy at the time of dialysis and it w


as thought that she had diabetes related nephrosclerosis, although her diabetic 


history was very minimal at which point, she has been on dialysis since.  She 


has had evaluations for cardiac conditions in the past without any consequences.


 She makes minimal urine.  She denies fevers, chills, nausea, vomiting, chest 


pain, shortness of at this time.  Chest pain earlier that resolved.


on hd today.


poc reviewed with dr. tello.


 


 


PHYSICAL EXAMINATION:





HEENT:  Normocephalic, atraumatic.  Pupils equal, round and reactive to light.  


Oropharynx is moist.


NECK:  Supple.


HEART:  Regular rate and rhythm.


LUNGS:  Clear to auscultation.


ABDOMEN:  Soft, nontender, nondistended.  Bowel sounds are present.


EXT: no c,c,e





Objective


Vitals





Vital Signs


  Date      Temp  Pulse  Resp  B/P (MAP)   Pulse Ox  O2          O2 Flow    FiO2


Time                                                 Delivery    Rate


   7/20/19  98.0     85    20      148/67        91  Nasal


     07:23                           (94)            Cannula


   7/20/19                                                             2.0


     01:42








Intake and Output





7/19/19 7/19/19 7/20/19





1515:00


23:00


07:00





IntakeIntake Total


760 ml


350 ml





OutputOutput Total


2 ml





BalanceBalance


758 ml


350 ml














Results/Medications


Result Diagram:  


7/20/19 0557                                                                    


           7/20/19 0556





Results 24 hrs





Laboratory Tests


       Test
                                7/20/19
05:56  7/20/19
05:57


       Sodium Level                                134  L


       Potassium Level                             5.8  H


       Chloride Level                               93  L


       Carbon Dioxide Level                          29


       Anion Gap                                     12


       Blood Urea Nitrogen                          52  H


       Creatinine                                 5.80  H


       Est Glomerular Filtrat Rate
mL/min           7  L
  



       Glucose Level                                 99


       Calcium Level                               7.4  L


       White Blood Count                                         4.1  #L


       Red Blood Count                                           3.27  L


       Hemoglobin                                                10.0  L


       Hematocrit                                                32.8  L


       Mean Corpuscular Volume                                   100.3


       Mean Corpuscular Hemoglobin                                30.6


       Mean Corpuscular Hemoglobin
Concent  
                   30.5  L



       Red Cell Distribution Width                                13.8


       Platelet Count                                              145


       Mean Platelet Volume                                       10.4


       Immature Granulocytes %                                   0.200


       Neutrophils %                                              70.7


       Lymphocytes %                                             11.4  L


       Monocytes %                                                10.2


       Eosinophils %                                               6.8


       Basophils %                                                 0.7


       Nucleated Red Blood Cells %                                 0.0


       Immature Granulocytes #                                   0.010


       Neutrophils #                                               2.9


       Lymphocytes #                                              0.5  L


       Monocytes #                                                 0.4


       Eosinophils #                                               0.3


       Basophils #                                                 0.0


       Nucleated Red Blood Cells #                                 0.0





Home Meds


Reported Medications


Pregabalin* (Lyrica*) 75 Mg Capsule, 75 MG PO BID, CAP


   7/18/19


Patiromer Calcium Sorbitex (Veltassa) 8.4 Gm Powd.pack, 8.4 GM PO BID


   ON NON-DIALYSIS DAYS


   7/18/19


Omeprazole* (Omeprazole*) 40 Mg Capsule.dr, 40 MG PO QHS, #30 CAP


   7/18/19


Losartan Potassium* (Losartan Potassium*) 25 Mg Tablet, 25 MG PO BID, TAB


   7/18/19


Amlodipine Besylate* (Norvasc*) 5 Mg Tablet, 5 MG PO QHS, TAB


   7/18/19


Discontinued Reported Medications


Pregabalin* (Lyrica*) 75 Mg Capsule, 75 MG PO BID, CAP


   4/24/19


Hydrocodone/Acetaminophen (Norco  Tablet) 1 Each Tablet, 1 EACH PO Q4H for


PAIN 7-9/10, TAB


   5/21/18


Discontinued Scripts


Levofloxacin* (Levofloxacin*) 750 Mg Tablet, 750 MG PO DAILY for 7 Days, #7 TAB


   Prov:MADISON FLORIAN S.         4/25/19


Benzocaine/Menthol (SORE THROAT LOZENGE) 1 Each Lozenge, 1 LOZENGE MT Q1H PRN 


for  sore throat and cough , #14 LOZENGE


   Prov:MADISON FLORIAN S.         4/25/19


Medications





Current Medications


Losartan Potassium (Cozaar) 25 mg BID PO  Last administered on 7/20/19at 08:21; 


Admin Dose 25 MG;  Start 7/18/19 at 21:00


Pregabalin (Lyrica) 75 mg BID PO  Last administered on 7/20/19at 08:28; Admin 


Dose 75 MG;  Start 7/18/19 at 21:00


IV Flush (NS 3 ml) 3 ml PER PROTOCOL IV ;  Start 7/18/19 at 16:30


Ondansetron HCl (Zofran Inj) 4 mg Q6H  PRN IV NAUSEA/VOMITING;  Start 7/18/19 at


16:30


Acetaminophen (Tylenol Tab) 650 mg Q6H  PRN PO .PAIN 1-3 OR TEMP;  Start 7/18/19


at 16:30


Acetaminophen/ Hydrocodone Bitart (Norco (5/325)) 1 tab Q6H  PRN PO .MOD PAIN 4-


6 Last administered on 7/19/19at 06:26; Admin Dose 1 TAB;  Start 7/18/19 at 


16:30


Morphine Sulfate (morphine) 2 mg Q4H  PRN IV .SEVERE PAIN 7-10 Last administered


on 7/20/19at 08:22; Admin Dose 2 MG;  Start 7/18/19 at 16:30


Docusate Sodium (Colace) 100 mg Q12H  PRN PO .CONSTIPATION;  Start 7/18/19 at 


16:30


Magnesium Hydroxide (Milk Of Mag) 30 ml DAILY  PRN PO .CONSTIPATION;  Start 


7/18/19 at 16:30


Heparin Sodium (Porcine) (Heparin  (5000 Units/1ml)) 5,000 unit Q12 SC ;  Start 


7/18/19 at 21:00


Lorazepam (Ativan) 0.5 mg Q6H  PRN IV ANXIETY;  Start 7/18/19 at 16:30


Albuterol/ Ipratropium (Duoneb) 3 ml Q4H RESP THERAPY  PRN HHN SHORTNESS OF 


BREATH;  Start 7/18/19 at 16:30


Hydralazine HCl (Apresoline) 10 mg Q6H  PRN IV ELEVATED BLOOD PRESSURE;  Start 


7/18/19 at 16:30


Nitroglycerin (Nitroglycerin (Sl Tab) 0.4 Mg) 1 tab Q5M  PRN SL ANGINA;  Start 


7/18/19 at 16:30


Aspirin (Ecotrin) 325 mg DAILY PO  Last administered on 7/20/19at 08:21; Admin 


Dose 325 MG;  Start 7/19/19 at 09:00


Pantoprazole (Protonix Tab) 40 mg DAILY@06 PO  Last administered on 7/20/19at 


05:30; Admin Dose 40 MG;  Start 7/18/19 at 21:30


Non-Formulary Medication 0.5 ea BID PO  Last administered on 7/20/19at 08:21; 


Admin Dose 0.5 EA;  Start 7/18/19 at 22:00





Assessment/Plan


Hospital Course (Demo Recall)


1.  End-stage renal disease.  Will dialyze on schedule today as patient has 


hyperkalemia.  


-likely underlying diabetic nephrosclerosis.  Will monitor.


- assess daily for hd needs.  


- all meds dosed ok.


2.  Chest pain.  Workup is underway.  Claims has had numerous cardiac workups 


but none recently.


3.  History of lupus with unclear diagnostic criteria on no medicines.


4.  Hypertension.  Continue regular medications.


5.  Anemia of chronic kidney disease.  At this time, patient has pancytopenia.  


The patient is on mircerra as an outpatient.  The patient had a bad REACTION TO 


ERYTHROPOIETIN, so will not order any here.


6.  Leukopenia.  Consider workup, may be related to the autoimmune disease.











RAJIV AHMADI MD          Jul 20, 2019 09:06

## 2019-07-20 NOTE — CONS
Assessment/Plan


Cardiology


NYHA:  III


Heart Failure Type:  Chronic


Heart Failure Type:  Systolic





Assessment/Plan


Hospital Course (Demo Recall)


Assessment:


Pleuritic chest pain - ruled out for myocardial infarction, possibly due to 


pleural effusion


Moderate loculated right pleural effusion


Chronic systolic heart failure - clinically compensated


Nonischemic cardiomyopathy - LVEF 30-35% on current echocardiogram


Hypertension


End-stage renal disease - on hemodialysis


Suspected rheumatologic disease - no definitive diagnosis





Recommendations:


-add carvedilol 6.25mg BID


-continue losartan 25mg BID


-continue aspirin at 81mg daily


-volume management via hemodialysis





-no additional cardiac work up at this time


-follow up with outpatient cardiologist, may need primary prevention ICD if LVEF


does not improve on optimal medical therapy


-recommend outpatient rheumatology follow up





Consultation Date/Type/Reason


Admit Date/Time


Jul 19, 2019 at 16:51


Type of Consult


Cardiology


Reason for Consultation


chest pain, congestive heart failure


Date/Time of Note


DATE: 7/20/19 


TIME: 19:23





Hx of Present Illness


The patient is a 64 year-old female with chronic systolic heart failure and 


nonischemic cardiomyopathy who presented with chest pain. She describes three 


weeks of sharp, right-sided chest pain under her breast radiating to the back. 


The patient is exacerbated by deep respiratory inspirations. She was diagnosed 


with pneumonia by her primary care provider, and was prescribed a course of 


antibiotics, which she has completed. She presented to the hospital with 


continued chest pain, which was exacerbated after an argument with her mother.





EKG shows sinus rhythm with nonspecific ST segment changes. Troponins have been 


in the normal range x 3. Transthoracic echocardiogram showed a moderately 


enlarged left ventricle with global hypokinesis and ejection fraction of 30-35%.


Chest x-ray and CT show a moderate loculated right pleural effusion.





She reports being diagnosed with systolic heart failure 8 years ago, with left 


ventricular ejection fraction of 20% at that time. Since then, she has undergone


coronary angiography twice without obstructive coronary artery disease.





She also reports suspected rheumatologic disease, although she has not received 


a definitive diagnosis.


14 point review of systems negative other than per HPI.





Past Medical History


Chronic systolic heart failure


Nonischemic cardiomyopathy


Hypertension


End-stage renal disease


Suspected rheumatologic disease


Home Meds


Reported Medications


Pregabalin* (Lyrica*) 75 Mg Capsule, 75 MG PO BID, CAP


   7/18/19


Patiromer Calcium Sorbitex (Veltassa) 8.4 Gm Powd.pack, 8.4 GM PO BID


   ON NON-DIALYSIS DAYS


   7/18/19


Omeprazole* (Omeprazole*) 40 Mg Capsule.dr, 40 MG PO QHS, #30 CAP


   7/18/19


Losartan Potassium* (Losartan Potassium*) 25 Mg Tablet, 25 MG PO BID, TAB


   7/18/19


Amlodipine Besylate* (Norvasc*) 5 Mg Tablet, 5 MG PO QHS, TAB


   7/18/19


Discontinued Reported Medications


Pregabalin* (Lyrica*) 75 Mg Capsule, 75 MG PO BID, CAP


   4/24/19


Hydrocodone/Acetaminophen (Norco  Tablet) 1 Each Tablet, 1 EACH PO Q4H for


PAIN 7-9/10, TAB


   5/21/18


Discontinued Scripts


Levofloxacin* (Levofloxacin*) 750 Mg Tablet, 750 MG PO DAILY for 7 Days, #7 TAB


   Prov:MADISON FLORIAN S.         4/25/19


Benzocaine/Menthol (SORE THROAT LOZENGE) 1 Each Lozenge, 1 LOZENGE MT Q1H PRN 


for  sore throat and cough , #14 LOZENGE


   Prov:MADISON FLORIAN S.         4/25/19


Medications





Current Medications


Losartan Potassium (Cozaar) 25 mg BID PO  Last administered on 7/20/19at 08:21; 


Admin Dose 25 MG;  Start 7/18/19 at 21:00


Pregabalin (Lyrica) 75 mg BID PO  Last administered on 7/20/19at 08:28; Admin 


Dose 75 MG;  Start 7/18/19 at 21:00


IV Flush (NS 3 ml) 3 ml PER PROTOCOL IV ;  Start 7/18/19 at 16:30


Ondansetron HCl (Zofran Inj) 4 mg Q6H  PRN IV NAUSEA/VOMITING;  Start 7/18/19 at


16:30


Acetaminophen (Tylenol Tab) 650 mg Q6H  PRN PO .PAIN 1-3 OR TEMP;  Start 7/18/19


at 16:30


Acetaminophen/ Hydrocodone Bitart (Norco (5/325)) 1 tab Q6H  PRN PO .MOD PAIN 4-


6 Last administered on 7/20/19at 15:22; Admin Dose 1 TAB;  Start 7/18/19 at 


16:30


Morphine Sulfate (morphine) 2 mg Q4H  PRN IV .SEVERE PAIN 7-10 Last administered


on 7/20/19at 08:22; Admin Dose 2 MG;  Start 7/18/19 at 16:30


Docusate Sodium (Colace) 100 mg Q12H  PRN PO .CONSTIPATION;  Start 7/18/19 at 


16:30


Magnesium Hydroxide (Milk Of Mag) 30 ml DAILY  PRN PO .CONSTIPATION;  Start 


7/18/19 at 16:30


Heparin Sodium (Porcine) (Heparin  (5000 Units/1ml)) 5,000 unit Q12 SC ;  Start 


7/18/19 at 21:00


Lorazepam (Ativan) 0.5 mg Q6H  PRN IV ANXIETY;  Start 7/18/19 at 16:30


Albuterol/ Ipratropium (Duoneb) 3 ml Q4H RESP THERAPY  PRN HHN SHORTNESS OF 


BREATH;  Start 7/18/19 at 16:30


Hydralazine HCl (Apresoline) 10 mg Q6H  PRN IV ELEVATED BLOOD PRESSURE;  Start 


7/18/19 at 16:30


Nitroglycerin (Nitroglycerin (Sl Tab) 0.4 Mg) 1 tab Q5M  PRN SL ANGINA;  Start 


7/18/19 at 16:30


Aspirin (Ecotrin) 325 mg DAILY PO  Last administered on 7/20/19at 08:21; Admin 


Dose 325 MG;  Start 7/19/19 at 09:00


Pantoprazole (Protonix Tab) 40 mg DAILY@06 PO  Last administered on 7/20/19at 


05:30; Admin Dose 40 MG;  Start 7/18/19 at 21:30


Non-Formulary Medication 0.5 ea BID PO  Last administered on 7/20/19at 08:21; 


Admin Dose 0.5 EA;  Start 7/18/19 at 22:00


Allergies:  


Coded Allergies:  


     lidocaine (Unverified  Allergy, Unknown, 7/18/19)





Past Surgical History


Cholecystectomy


Salpingo-oophorectomy


Left upper extremity AV fistula


Past Surgical Hx:  other





Family History


Significant Family History:  no pertinent family hx





Social History


Smoking Status:  Never smoker





Exam/Review of Systems


Vital Signs


Vitals





Vital Signs


  Date      Temp  Pulse  Resp  B/P (MAP)   Pulse Ox  O2          O2 Flow    FiO2


Time                                                 Delivery    Rate


   7/20/19  98.0     79    20      159/69        91  Nasal


     15:06                           (99)            Cannula


   7/20/19                                                             2.0


     13:34








Intake and Output





7/19/19 7/19/19 7/20/19





1515:00


23:00


07:00





IntakeIntake Total


760 ml


350 ml





OutputOutput Total


2 ml





BalanceBalance


758 ml


350 ml














Exam


Constitutional:  alert, well developed


Psych:  no complaints, nl mood/affect


Head:  normocephalic, atraumatic


Eyes:  nl conjunctiva, nl lids


ENMT:  nl external ears & nose, nl nasal mucosa & septum


Neck:  supple, non-tender


Respiratory:  diminished breath sounds (right base)


Cardiovascular:  regular rate and rhythm, systolic murmur


Gastrointestinal:  soft, non-tender


Musculoskeletal:  nl extremities to inspection


Extremities:  No cyanosis, No clubbing, No edema


Neurological:  nl mental status, nl speech





Labs


Result Diagram:  


7/20/19 0557                                                                    


           7/20/19 0556





Results 24hrs





Laboratory Tests


Test
                                7/20/19
05:54  7/20/19
05:56  7/20/19
05:57


Hepatitis B Surface Antigen          NEGATIVE


Sodium Level                                               134  L


Potassium Level                                            5.8  H


Chloride Level                                              93  L


Carbon Dioxide Level                                         29


Anion Gap                                                    12


Blood Urea Nitrogen                                         52  H


Creatinine                                                5.80  H


Est Glomerular Filtrat Rate
mL/min   
                      7  L
  



Glucose Level                                                99


Calcium Level                                              7.4  L


White Blood Count                                                        4.1  #L


Red Blood Count                                                          3.27  L


Hemoglobin                                                               10.0  L


Hematocrit                                                               32.8  L


Mean Corpuscular Volume                                                  100.3


Mean Corpuscular Hemoglobin                                               30.6


Mean Corpuscular Hemoglobin
Concent  
              
                   30.5  L



Red Cell Distribution Width                                               13.8


Platelet Count                                                             145


Mean Platelet Volume                                                      10.4


Immature Granulocytes %                                                  0.200


Neutrophils %                                                             70.7


Lymphocytes %                                                            11.4  L


Monocytes %                                                               10.2


Eosinophils %                                                              6.8


Basophils %                                                                0.7


Nucleated Red Blood Cells %                                                0.0


Immature Granulocytes #                                                  0.010


Neutrophils #                                                              2.9


Lymphocytes #                                                             0.5  L


Monocytes #                                                                0.4


Eosinophils #                                                              0.3


Basophils #                                                                0.0


Nucleated Red Blood Cells #                                                0.0








Medications


Medications





Current Medications


Losartan Potassium (Cozaar) 25 mg BID PO  Last administered on 7/20/19at 08:21; 


Admin Dose 25 MG;  Start 7/18/19 at 21:00


Pregabalin (Lyrica) 75 mg BID PO  Last administered on 7/20/19at 08:28; Admin 


Dose 75 MG;  Start 7/18/19 at 21:00


IV Flush (NS 3 ml) 3 ml PER PROTOCOL IV ;  Start 7/18/19 at 16:30


Ondansetron HCl (Zofran Inj) 4 mg Q6H  PRN IV NAUSEA/VOMITING;  Start 7/18/19 at


16:30


Acetaminophen (Tylenol Tab) 650 mg Q6H  PRN PO .PAIN 1-3 OR TEMP;  Start 7/18/19


at 16:30


Acetaminophen/ Hydrocodone Bitart (Norco (5/325)) 1 tab Q6H  PRN PO .MOD PAIN 4-


6 Last administered on 7/20/19at 15:22; Admin Dose 1 TAB;  Start 7/18/19 at 


16:30


Morphine Sulfate (morphine) 2 mg Q4H  PRN IV .SEVERE PAIN 7-10 Last administered


on 7/20/19at 08:22; Admin Dose 2 MG;  Start 7/18/19 at 16:30


Docusate Sodium (Colace) 100 mg Q12H  PRN PO .CONSTIPATION;  Start 7/18/19 at 


16:30


Magnesium Hydroxide (Milk Of Mag) 30 ml DAILY  PRN PO .CONSTIPATION;  Start 7/1 8/19 at 16:30


Heparin Sodium (Porcine) (Heparin  (5000 Units/1ml)) 5,000 unit Q12 SC ;  Start 


7/18/19 at 21:00


Lorazepam (Ativan) 0.5 mg Q6H  PRN IV ANXIETY;  Start 7/18/19 at 16:30


Albuterol/ Ipratropium (Duoneb) 3 ml Q4H RESP THERAPY  PRN HHN SHORTNESS OF 


BREATH;  Start 7/18/19 at 16:30


Hydralazine HCl (Apresoline) 10 mg Q6H  PRN IV ELEVATED BLOOD PRESSURE;  Start 


7/18/19 at 16:30


Nitroglycerin (Nitroglycerin (Sl Tab) 0.4 Mg) 1 tab Q5M  PRN SL ANGINA;  Start 


7/18/19 at 16:30


Aspirin (Ecotrin) 325 mg DAILY PO  Last administered on 7/20/19at 08:21; Admin 


Dose 325 MG;  Start 7/19/19 at 09:00


Pantoprazole (Protonix Tab) 40 mg DAILY@06 PO  Last administered on 7/20/19at 


05:30; Admin Dose 40 MG;  Start 7/18/19 at 21:30


Non-Formulary Medication 0.5 ea BID PO  Last administered on 7/20/19at 08:21; 


Admin Dose 0.5 EA;  Start 7/18/19 at 22:00











PEPE MAHONEY MD                Jul 20, 2019 19:34

## 2019-07-21 VITALS — SYSTOLIC BLOOD PRESSURE: 134 MMHG | RESPIRATION RATE: 18 BRPM | HEART RATE: 68 BPM | DIASTOLIC BLOOD PRESSURE: 60 MMHG

## 2019-07-21 VITALS — RESPIRATION RATE: 19 BRPM | DIASTOLIC BLOOD PRESSURE: 65 MMHG | SYSTOLIC BLOOD PRESSURE: 141 MMHG | HEART RATE: 69 BPM

## 2019-07-21 VITALS — HEART RATE: 69 BPM | RESPIRATION RATE: 20 BRPM | SYSTOLIC BLOOD PRESSURE: 160 MMHG | DIASTOLIC BLOOD PRESSURE: 72 MMHG

## 2019-07-21 VITALS — DIASTOLIC BLOOD PRESSURE: 70 MMHG | RESPIRATION RATE: 18 BRPM | HEART RATE: 70 BPM | SYSTOLIC BLOOD PRESSURE: 143 MMHG

## 2019-07-21 VITALS — SYSTOLIC BLOOD PRESSURE: 138 MMHG | DIASTOLIC BLOOD PRESSURE: 73 MMHG | RESPIRATION RATE: 18 BRPM | HEART RATE: 81 BPM

## 2019-07-21 LAB
ABNORMAL IP MESSAGE: 1
ADD MAN DIFF?: NO
ANION GAP: 9 (ref 5–13)
BASOPHIL #: 0 10^3/UL (ref 0–0.1)
BASOPHILS %: 0.6 % (ref 0–2)
BLOOD UREA NITROGEN: 44 MG/DL (ref 7–20)
CALCIUM: 7.2 MG/DL (ref 8.4–10.2)
CARBON DIOXIDE: 35 MMOL/L (ref 21–31)
CHLORIDE: 91 MMOL/L (ref 97–110)
CREATININE: 4.38 MG/DL (ref 0.44–1)
EOSINOPHILS #: 0.3 10^3/UL (ref 0–0.5)
EOSINOPHILS %: 9.4 % (ref 0–7)
GLUCOSE: 88 MG/DL (ref 70–220)
HEMATOCRIT: 30.2 % (ref 37–47)
HEMOGLOBIN: 9.6 G/DL (ref 12–16)
IMMATURE GRANS #M: 0.02 10^3/UL (ref 0–0.03)
IMMATURE GRANS % (M): 0.6 % (ref 0–0.43)
LYMPHOCYTES #: 0.5 10^3/UL (ref 0.8–2.9)
LYMPHOCYTES %: 13.8 % (ref 15–51)
MEAN CORPUSCULAR HEMOGLOBIN: 31.6 PG (ref 29–33)
MEAN CORPUSCULAR HGB CONC: 31.8 G/DL (ref 32–37)
MEAN CORPUSCULAR VOLUME: 99.3 FL (ref 82–101)
MEAN PLATELET VOLUME: 10.4 FL (ref 7.4–10.4)
MONOCYTE #: 0.6 10^3/UL (ref 0.3–0.9)
MONOCYTES %: 15.2 % (ref 0–11)
NEUTROPHIL #: 2.2 10^3/UL (ref 1.6–7.5)
NEUTROPHILS %: 60.4 % (ref 39–77)
NUCLEATED RED BLOOD CELLS #: 0 10^3/UL (ref 0–0)
NUCLEATED RED BLOOD CELLS%: 0 /100WBC (ref 0–0)
PLATELET COUNT: 141 10^3/UL (ref 140–415)
POSITIVE DIFF: (no result)
POTASSIUM: 5 MMOL/L (ref 3.5–5.1)
RED BLOOD COUNT: 3.04 10^6/UL (ref 4.2–5.4)
RED CELL DISTRIBUTION WIDTH: 13.7 % (ref 11.5–14.5)
SODIUM: 135 MMOL/L (ref 135–144)
WHITE BLOOD COUNT: 3.6 10^3/UL (ref 4.8–10.8)

## 2019-07-21 RX ADMIN — ASPIRIN 1 MG: 81 TABLET, COATED ORAL at 08:34

## 2019-07-21 RX ADMIN — HEPARIN SODIUM SCH UNIT: 5000 INJECTION, SOLUTION INTRAVENOUS; SUBCUTANEOUS at 08:37

## 2019-07-21 RX ADMIN — PREGABALIN SCH MG: 75 CAPSULE ORAL at 08:33

## 2019-07-21 RX ADMIN — PANTOPRAZOLE SODIUM 1 MG: 40 TABLET, DELAYED RELEASE ORAL at 06:01

## 2019-07-21 RX ADMIN — HYDROCODONE BITARTRATE AND ACETAMINOPHEN PRN TAB: 5; 325 TABLET ORAL at 08:50

## 2019-07-21 RX ADMIN — PANTOPRAZOLE SODIUM SCH MG: 40 TABLET, DELAYED RELEASE ORAL at 06:01

## 2019-07-21 RX ADMIN — LOSARTAN POTASSIUM 1 MG: 25 TABLET ORAL at 08:34

## 2019-07-21 RX ADMIN — HEPARIN SODIUM 1 UNIT: 5000 INJECTION, SOLUTION INTRAVENOUS; SUBCUTANEOUS at 08:37

## 2019-07-21 RX ADMIN — LOSARTAN POTASSIUM SCH MG: 25 TABLET, FILM COATED ORAL at 08:34

## 2019-07-21 RX ADMIN — HYDROCODONE BITARTRATE AND ACETAMINOPHEN 1 TAB: 5; 325 TABLET ORAL at 08:50

## 2019-07-21 RX ADMIN — PREGABALIN 1 MG: 75 CAPSULE ORAL at 08:33

## 2019-07-21 NOTE — CONS
Assessment/Plan


Cardiology


NYHA:  III


Heart Failure Type:  Chronic


Heart Failure Type:  Systolic





Assessment/Plan


Hospital Course (Demo Recall)


Assessment:


Pleuritic chest pain - ruled out for myocardial infarction, possibly due to 


pleural effusion


Moderate loculated right pleural effusion


Chronic systolic heart failure - clinically compensated


Nonischemic cardiomyopathy - LVEF 30-35% on current echocardiogram


Hypertension


End-stage renal disease - on hemodialysis


Suspected rheumatologic disease - no definitive diagnosis





Recommendations:


-continue carvedilol 6.25mg BID


-continue losartan 25mg BID


-continue aspirin at 81mg daily


-volume management via hemodialysis





-no additional cardiac work up at this time


-outpatient cardiology follow up, may need primary prevention ICD if LVEF does 


not improve on optimal medical therapy


-recommend outpatient rheumatology follow up





Consultation Date/Type/Reason


Admit Date/Time


Jul 19, 2019 at 16:51


Initial Consult Date





Type of Consult


Cardiology


Date/Time of Note


DATE: 7/21/19 


TIME: 15:54





24 HR Interval Summary


Free Text/Dictation


No acute events.





Detailed Summary


Additional Comments


14 point review of systems without changes.





Exam/Review of Systems


Vital Signs


Vitals





Vital Signs


  Date      Temp  Pulse  Resp  B/P (MAP)   Pulse Ox  O2          O2 Flow    FiO2


Time                                                 Delivery    Rate


   7/21/19                                                             2.0


     13:09


   7/21/19  98.0     81    18      138/73        98


     12:26                           (94)


   7/21/19                                                                    27


     06:02


   7/21/19                                           Nasal


     03:15                                           Cannula








Intake and Output





7/20/19 7/20/19 7/21/19





1515:00


23:00


07:00





IntakeIntake Total


880 ml


300 ml





OutputOutput Total


3400 ml


3000 ml





BalanceBalance


-3400 ml


-2120 ml


300 ml














Exam


Exam


Constitutional:  alert, well developed


Psych:  no complaints, nl mood/affect


Head:  normocephalic, atraumatic


Eyes:  nl conjunctiva, nl lids


ENMT:  nl external ears & nose, nl nasal mucosa & septum


Neck:  supple, non-tender


Respiratory:  diminished breath sounds (right base)


Cardiovascular:  regular rate and rhythm, systolic murmur


Gastrointestinal:  soft, non-tender


Musculoskeletal:  nl extremities to inspection


Extremities:  No cyanosis, No clubbing, No edema


Neurological:  nl mental status, nl speech





Labs


Result Diagram:  


7/21/19 0537                                                                    


           7/21/19 0537





Results 24hrs





Laboratory Tests


               Test
                                7/21/19
05:37


               White Blood Count                           3.6  L


               Red Blood Count                            3.04  L


               Hemoglobin                                  9.6  L


               Hematocrit                                 30.2  L


               Mean Corpuscular Volume                     99.3


               Mean Corpuscular Hemoglobin                 31.6


               Mean Corpuscular Hemoglobin
Concent       31.8  L



               Red Cell Distribution Width                 13.7


               Platelet Count                               141


               Mean Platelet Volume                        10.4


               Immature Granulocytes %                   0.600  H


               Neutrophils %                               60.4


               Lymphocytes %                              13.8  L


               Monocytes %                                15.2  H


               Eosinophils %                               9.4  H


               Basophils %                                  0.6


               Nucleated Red Blood Cells %                  0.0


               Immature Granulocytes #                    0.020


               Neutrophils #                                2.2


               Lymphocytes #                               0.5  L


               Monocytes #                                  0.6


               Eosinophils #                                0.3


               Basophils #                                  0.0


               Nucleated Red Blood Cells #                  0.0


               Sodium Level                                 135


               Potassium Level                              5.0


               Chloride Level                               91  L


               Carbon Dioxide Level                         35  H


               Anion Gap                                      9


               Blood Urea Nitrogen                          44  H


               Creatinine                                4.38  #H


               Est Glomerular Filtrat Rate
mL/min          10  L



               Glucose Level                                 88


               Calcium Level                               7.2  L








Medications


Medications





Current Medications


Losartan Potassium (Cozaar) 25 mg BID PO  Last administered on 7/21/19at 08:34; 


Admin Dose 25 MG;  Start 7/18/19 at 21:00


Pregabalin (Lyrica) 75 mg BID PO  Last administered on 7/21/19at 08:33; Admin 


Dose 75 MG;  Start 7/18/19 at 21:00


IV Flush (NS 3 ml) 3 ml PER PROTOCOL IV ;  Start 7/18/19 at 16:30


Ondansetron HCl (Zofran Inj) 4 mg Q6H  PRN IV NAUSEA/VOMITING;  Start 7/18/19 at


16:30


Acetaminophen (Tylenol Tab) 650 mg Q6H  PRN PO .PAIN 1-3 OR TEMP;  Start 7/18/19


at 16:30


Acetaminophen/ Hydrocodone Bitart (Norco (5/325)) 1 tab Q6H  PRN PO .MOD PAIN 4-


6 Last administered on 7/21/19at 08:50; Admin Dose 1 TAB;  Start 7/18/19 at 


16:30


Morphine Sulfate (morphine) 2 mg Q4H  PRN IV .SEVERE PAIN 7-10 Last administered


on 7/20/19at 08:22; Admin Dose 2 MG;  Start 7/18/19 at 16:30


Docusate Sodium (Colace) 100 mg Q12H  PRN PO .CONSTIPATION;  Start 7/18/19 at 


16:30


Magnesium Hydroxide (Milk Of Mag) 30 ml DAILY  PRN PO .CONSTIPATION;  Start 


7/18/19 at 16:30


Heparin Sodium (Porcine) (Heparin  (5000 Units/1ml)) 5,000 unit Q12 SC ;  Start 


7/18/19 at 21:00


Lorazepam (Ativan) 0.5 mg Q6H  PRN IV ANXIETY;  Start 7/18/19 at 16:30


Albuterol/ Ipratropium (Duoneb) 3 ml Q4H RESP THERAPY  PRN HHN SHORTNESS OF 


BREATH;  Start 7/18/19 at 16:30


Hydralazine HCl (Apresoline) 10 mg Q6H  PRN IV ELEVATED BLOOD PRESSURE;  Start 


7/18/19 at 16:30


Nitroglycerin (Nitroglycerin (Sl Tab) 0.4 Mg) 1 tab Q5M  PRN SL ANGINA;  Start 


7/18/19 at 16:30


Pantoprazole (Protonix Tab) 40 mg DAILY@06 PO  Last administered on 7/21/19at 


06:01; Admin Dose 40 MG;  Start 7/18/19 at 21:30


Non-Formulary Medication 0.5 ea BID PO  Last administered on 7/21/19at 08:36; 


Admin Dose 0.5 EA;  Start 7/18/19 at 22:00


Aspirin (Halfprin) 81 mg DAILY PO  Last administered on 7/21/19at 08:34; Admin 


Dose 81 MG;  Start 7/21/19 at 09:00


Carvedilol (Coreg) 6.25 mg BID PO  Last administered on 7/21/19at 08:34; Admin 


Dose 6.25 MG;  Start 7/20/19 at 21:00











PEPE MAHONEY MD                Jul 21, 2019 15:55

## 2019-07-21 NOTE — PDOCDIS
Discharge Instructions


CONDITION


                [id number]
Patient Condition:  [id number]
Stable








HOME CARE INSTRUCTIONS:


         [id number]
Diet Instructions:  [id number]
Low Fat /Cholesterol








ACTIVITY:


     [id number]
Activity Restrictions:  [id number]
Slowly Increase Activity


                                        Rest between Activity


                                        Avoid heavy lifting








FOLLOW UP/APPOINTMENTS


Follow-up Plan


Please take your medication as prescribed, see your doctor in the clinic in the 


next 1 week.











MADISON FLORIAN              Jul 21, 2019 11:16

## 2019-07-21 NOTE — DS
Date/Time of Note


Date/Time of Note


DATE: 7/21/19 


TIME: 11:22





Discharge Summary


Admission/Discharge Info


Admit Date/Time


Jul 19, 2019 at 16:51


Discharge Date/Time





Discharge Diagnosis


# cp: has ruled out acute coronary syndrome.  Patient does admit to high stress 


episode (big argument with her mother) 48 hours prior to admission, this could 


have contributed to patient's pain symptoms.  Also findings of moderate pleural 


effusion on the right side, improved after dialysis





# Moderate loculated right pleural effusion -improving with dialysis





# Chronic systolic heart failure - clinically compensated





# Nonischemic cardiomyopathy - LVEF 30-35% on current echocardiogram





#  Possible lupus-awaiting another outpatient referral for rheumatology





# Hypertension: Blood pressure stable, continue current medications





# Normocytic anemia: Likely anemia of chronic disease/renal failure





# End-stage renal disease: On dialysis


Patient Condition:  Stable


Procedures


2D echo:


Conclusions:


 Normal left ventricular wall thickness. Moderate enlargement of left 


ventricle cavity. Severe global left ventricular systolic dysfunction. 


Ejection fraction is visually estimated at 30-35 %. Tissue 


Doppler/Mitral Doppler indices are consistent with impaired relaxation 


(Stage I diastolic dysfunction).


 Normal right ventricular systolic function. Mild enlargement of right 


ventricle.


 There is moderate tricuspid regurgitation.


 Small pericardial effusion. Pleural effusion seen.


 The estimated Peak RVSP is 55 mmHg.


 Dilated IVC without respiratory collapse consistent with elevated right 


atrial pressure.


Hx of Present Illness


64-year-old female past medical history of end-stage renal disease on dialysis, 


lupus, hypertension, prior UTI, who presents with chest pain.  Patient states 


the symptoms first began about 3 weeks ago and she went to her primary care 


doctor about 2 weeks ago and had chest x-ray performed at that time.  She was 


diagnosed with a small pneumonia and given antibiotics for little over a week.  


She took those antibiotics with minimal improvement in her symptoms until today 


when her chest pain recurred.  She describes the pain occurring near her right 


breast and radiating to her back she denies any trauma to the area no falls she 


said some nausea symptoms for 1 day as well but no vomiting no upper lower GI 


bleeding no shortness of breath no fevers or chills.  When she came in today she


had a chest x-ray that showed:





IMPRESSION:


1.  Moderate right and small left pleural effusions, not significantly changed 


when compared to the prior examination.


2.  Right basilar atelectasis versus pneumonia, also unchanged.


3.  Interval improvement in degree of pulmonary vascular congestion.


4.  Mild cardiomegaly.  





Patient states she has been going to dialysis regularly including her last 


session being earlier today.  No apparent prior history of any strokes or heart 


attacks in the past.


Hospital Course


Patient was admitted and seen by renal, cardiology teams during this hospital 


stay.  She ruled out for acute coronary syndrome.  She still complained of some 


right-sided chest pain symptoms and some low back pain underwent a CT of the 


chest that did show signs of moderate right-sided pleural effusion and possible 


signs of pulmonary hypertension.  Also mild pericardial effusion.  Patient 


underwent dialysis as regularly scheduled, her pain symptoms somewhat improved. 


She was able to amply, tolerated p.o. diet.  Her echocardiogram results were 


reviewed and she was placed on new cardiac medications to help control her heart


failure symptoms better for her chronic systolic heart failure.  Patient will be


discharged home today in improved condition.  She will go home with home health 


PT.  See below for full list of discharge medications.











Home Meds


Active Scripts


Aspirin Delayed Release (Aspirin Delayed Release) 81 Mg Tablet., 81 MG PO 


DAILY, #30 3 Refills


   Prov:MADISON FLORIAN S.         7/21/19


Carvedilol* (Carvedilol*) 6.25 Mg Tablet, 6.25 MG PO BID, #60 TAB 3 Refills


   Prov:MADISON FLORIAN S.         7/21/19


Benzocaine/Menthol* (Cepacol* Sore Throat Lozenges) 1 Each Lozenge, 1 EACH MM 


q2h PRN for SORE THROAT, #1 BOTTLE


   Prov:MADISON FLORIAN S.         7/21/19


Reported Medications


Pregabalin* (Lyrica*) 75 Mg Capsule, 75 MG PO BID, CAP


   7/18/19


Patiromer Calcium Sorbitex (Veltassa) 8.4 Gm Powd.pack, 8.4 GM PO BID


   ON NON-DIALYSIS DAYS


   7/18/19


Omeprazole* (Omeprazole*) 40 Mg Capsule., 40 MG PO QHS, #30 CAP


   7/18/19


Losartan Potassium* (Losartan Potassium*) 25 Mg Tablet, 25 MG PO BID, TAB


   7/18/19


Discontinued Reported Medications


Amlodipine Besylate* (Norvasc*) 5 Mg Tablet, 5 MG PO QHS, TAB


   7/18/19


Pregabalin* (Lyrica*) 75 Mg Capsule, 75 MG PO BID, CAP


   4/24/19


Hydrocodone/Acetaminophen (Norco  Tablet) 1 Each Tablet, 1 EACH PO Q4H for


PAIN 7-9/10, TAB


   5/21/18


Discontinued Scripts


Levofloxacin* (Levofloxacin*) 750 Mg Tablet, 750 MG PO DAILY for 7 Days, #7 TAB


   Prov:MADISON FLORIAN S.         4/25/19


Benzocaine/Menthol (SORE THROAT LOZENGE) 1 Each Lozenge, 1 LOZENGE MT Q1H PRN 


for  sore throat and cough , #14 LOZENGE


   Prov:JAMIE FLORIANEEP S.         4/25/19


Follow-up Plan


Please take your medication as prescribed, see your doctor in the clinic in the 


next 1 week.


Primary Care Provider


Not On Staff Doctor


Time spent on discharge:   > 30 minutes


Pending Labs





Laboratory Tests


         Test
                                            7/21/19
05:37


         White Blood Count
                      3.6 10^3/ul
(4.8-10.8)


         Red Blood Count
                      3.04 10^6/ul
(4.20-5.40)


         Hemoglobin
                               9.6 g/dl
(12.0-16.0)


         Hematocrit
                                 30.2 %
(37.0-47.0)


         Mean Corpuscular Volume
                  99.3 fl
(82.0-101.0)


         Mean Corpuscular Hemoglobin
               31.6 pg
(29.0-33.0)


         Mean Corpuscular Hemoglobin
Concent      31.8 g/dl
(32.0-37.0)


         Red Cell Distribution Width
                13.7 %
(11.5-14.5)


         Platelet Count
                          141 10^3/UL
(140-415)


         Mean Platelet Volume
                       10.4 fl
(7.4-10.4)


         Immature Granulocytes %
                 0.600 %
(0.001-0.429)


         Neutrophils %
                              60.4 %
(39.0-77.0)


         Lymphocytes %
                              13.8 %
(15.0-51.0)


         Monocytes %
                                 15.2 %
(0.0-11.0)


         Eosinophils %
                                 9.4 %
(0.0-7.0)


         Basophils %
                                   0.6 %
(0.0-2.0)


         Nucleated Red Blood Cells %
             0.0 /100WBC
(0.0-0.0)


         Immature Granulocytes #
             0.020 10^3/ul
(0.0-0.031)


         Neutrophils #
                           2.2 10^3/ul
(1.6-7.5)


         Lymphocytes #
                           0.5 10^3/ul
(0.8-2.9)


         Monocytes #
                             0.6 10^3/ul
(0.3-0.9)


         Eosinophils #
                           0.3 10^3/ul
(0.0-0.5)


         Basophils #
                             0.0 10^3/ul
(0.0-0.1)


         Nucleated Red Blood Cells #
             0.0 10^3/ul
(0.0-0.0)


         Sodium Level
                             135 mmol/L
(135-144)


         Potassium Level
                          5.0 mmol/L
(3.5-5.1)


         Chloride Level
                             91 mmol/L
()


         Carbon Dioxide Level
                        35 mmol/L
(21-31)


         Anion Gap                                            9 (5-13)


         Blood Urea Nitrogen
                           44 mg/dl
(7-20)


         Creatinine
                             4.38 mg/dl
(0.44-1.00)


         Est Glomerular Filtrat Rate
mL/min             10 mL/min
(>60)


         Glucose Level
                               88 mg/dl
()


         Calcium Level
                            7.2 mg/dl
(8.4-10.2)














MADISON FLORIAN              Jul 21, 2019 11:28

## 2019-07-21 NOTE — CONS
Consult Date/Type/Reason


Admit Date/Time


Jul 19, 2019 at 16:51


Initial Consult Date





Date/Time of Note


DATE: 7/21/19 


TIME: 08:11





Subjective


64-year-old female with past medical history of end-stage renal disease on 


hemodialysis, lupus, hypertension, prior UTI, presented to the hospital after 


noticing some chest pain.  The patient has been on dialysis on Tuesday, 


Thursday, Saturday schedule.  Her last dialysis was yesterday, which she did 


without complication.  She dialyzes through a left upper arm fistula.  She has 


been on dialysis for 8 years.  She had a biopsy at the time of dialysis and it w


as thought that she had diabetes related nephrosclerosis, although her diabetic 


history was very minimal at which point, she has been on dialysis since.  She 


has had evaluations for cardiac conditions in the past without any consequences.


 She makes minimal urine.  She denies fevers, chills, nausea, vomiting, chest 


pain, shortness of at this time.  Chest pain earlier that resolved.


on hd yesterday without complication.


poc reviewed with dr. tello.


 


 


PHYSICAL EXAMINATION:





HEENT:  Normocephalic, atraumatic.  Pupils equal, round and reactive to light.  


Oropharynx is moist.


NECK:  Supple.


HEART:  Regular rate and rhythm.


LUNGS:  Clear to auscultation.


ABDOMEN:  Soft, nontender, nondistended.  Bowel sounds are present.


EXT: no c,c,e





Objective


Vitals





Vital Signs


  Date      Temp  Pulse  Resp  B/P (MAP)   Pulse Ox  O2          O2 Flow    FiO2


Time                                                 Delivery    Rate


   7/21/19  98.2     70    18      143/70        98


     07:40                           (94)


   7/21/19                                                             2.0    27


     06:02


   7/21/19                                           Nasal


     03:15                                           Cannula








Intake and Output





7/20/19 7/20/19 7/21/19





1515:00


23:00


07:00





IntakeIntake Total


880 ml


300 ml





OutputOutput Total


3400 ml


3000 ml





BalanceBalance


-3400 ml


-2120 ml


300 ml














Results/Medications


Result Diagram:  


7/21/19 0537                                                                    


           7/21/19 0537





Results 24 hrs





Laboratory Tests


               Test
                                7/21/19
05:37


               White Blood Count                           3.6  L


               Red Blood Count                            3.04  L


               Hemoglobin                                  9.6  L


               Hematocrit                                 30.2  L


               Mean Corpuscular Volume                     99.3


               Mean Corpuscular Hemoglobin                 31.6


               Mean Corpuscular Hemoglobin
Concent       31.8  L



               Red Cell Distribution Width                 13.7


               Platelet Count                               141


               Mean Platelet Volume                        10.4


               Immature Granulocytes %                   0.600  H


               Neutrophils %                               60.4


               Lymphocytes %                              13.8  L


               Monocytes %                                15.2  H


               Eosinophils %                               9.4  H


               Basophils %                                  0.6


               Nucleated Red Blood Cells %                  0.0


               Immature Granulocytes #                    0.020


               Neutrophils #                                2.2


               Lymphocytes #                               0.5  L


               Monocytes #                                  0.6


               Eosinophils #                                0.3


               Basophils #                                  0.0


               Nucleated Red Blood Cells #                  0.0


               Sodium Level                                 135


               Potassium Level                              5.0


               Chloride Level                               91  L


               Carbon Dioxide Level                         35  H


               Anion Gap                                      9


               Blood Urea Nitrogen                          44  H


               Creatinine                                4.38  #H


               Est Glomerular Filtrat Rate
mL/min          10  L



               Glucose Level                                 88


               Calcium Level                               7.2  L





Home Meds


Reported Medications


Pregabalin* (Lyrica*) 75 Mg Capsule, 75 MG PO BID, CAP


   7/18/19


Patiromer Calcium Sorbitex (Veltassa) 8.4 Gm Powd.pack, 8.4 GM PO BID


   ON NON-DIALYSIS DAYS


   7/18/19


Omeprazole* (Omeprazole*) 40 Mg Capsule.dr, 40 MG PO QHS, #30 CAP


   7/18/19


Losartan Potassium* (Losartan Potassium*) 25 Mg Tablet, 25 MG PO BID, TAB


   7/18/19


Amlodipine Besylate* (Norvasc*) 5 Mg Tablet, 5 MG PO QHS, TAB


   7/18/19


Discontinued Reported Medications


Pregabalin* (Lyrica*) 75 Mg Capsule, 75 MG PO BID, CAP


   4/24/19


Hydrocodone/Acetaminophen (Norco  Tablet) 1 Each Tablet, 1 EACH PO Q4H for


PAIN 7-9/10, TAB


   5/21/18


Discontinued Scripts


Levofloxacin* (Levofloxacin*) 750 Mg Tablet, 750 MG PO DAILY for 7 Days, #7 TAB


   Prov:MADISON FLORIAN S.         4/25/19


Benzocaine/Menthol (SORE THROAT LOZENGE) 1 Each Lozenge, 1 LOZENGE MT Q1H PRN 


for  sore throat and cough , #14 LOZENGE


   Prov:MADISON FLORIAN S.         4/25/19


Medications





Current Medications


Losartan Potassium (Cozaar) 25 mg BID PO  Last administered on 7/20/19at 20:10; 


Admin Dose 25 MG;  Start 7/18/19 at 21:00


Pregabalin (Lyrica) 75 mg BID PO  Last administered on 7/20/19at 20:10; Admin 


Dose 75 MG;  Start 7/18/19 at 21:00


IV Flush (NS 3 ml) 3 ml PER PROTOCOL IV ;  Start 7/18/19 at 16:30


Ondansetron HCl (Zofran Inj) 4 mg Q6H  PRN IV NAUSEA/VOMITING;  Start 7/18/19 at


16:30


Acetaminophen (Tylenol Tab) 650 mg Q6H  PRN PO .PAIN 1-3 OR TEMP;  Start 7/18/19


at 16:30


Acetaminophen/ Hydrocodone Bitart (Norco (5/325)) 1 tab Q6H  PRN PO .MOD PAIN 4-


6 Last administered on 7/20/19at 23:50; Admin Dose 1 TAB;  Start 7/18/19 at 


16:30


Morphine Sulfate (morphine) 2 mg Q4H  PRN IV .SEVERE PAIN 7-10 Last administered


on 7/20/19at 08:22; Admin Dose 2 MG;  Start 7/18/19 at 16:30


Docusate Sodium (Colace) 100 mg Q12H  PRN PO .CONSTIPATION;  Start 7/18/19 at 


16:30


Magnesium Hydroxide (Milk Of Mag) 30 ml DAILY  PRN PO .CONSTIPATION;  Start 


7/18/19 at 16:30


Heparin Sodium (Porcine) (Heparin  (5000 Units/1ml)) 5,000 unit Q12 SC ;  Start 


7/18/19 at 21:00


Lorazepam (Ativan) 0.5 mg Q6H  PRN IV ANXIETY;  Start 7/18/19 at 16:30


Albuterol/ Ipratropium (Duoneb) 3 ml Q4H RESP THERAPY  PRN HHN SHORTNESS OF 


BREATH;  Start 7/18/19 at 16:30


Hydralazine HCl (Apresoline) 10 mg Q6H  PRN IV ELEVATED BLOOD PRESSURE;  Start 


7/18/19 at 16:30


Nitroglycerin (Nitroglycerin (Sl Tab) 0.4 Mg) 1 tab Q5M  PRN SL ANGINA;  Start 7 /18/19 at 16:30


Pantoprazole (Protonix Tab) 40 mg DAILY@06 PO  Last administered on 7/21/19at 


06:01; Admin Dose 40 MG;  Start 7/18/19 at 21:30


Non-Formulary Medication 0.5 ea BID PO  Last administered on 7/20/19at 08:21; 


Admin Dose 0.5 EA;  Start 7/18/19 at 22:00


Aspirin (Ecotrin) 81 mg DAILY PO ;  Start 7/21/19 at 09:00


Carvedilol (Coreg) 6.25 mg BID PO  Last administered on 7/20/19at 20:10; Admin 


Dose 6.25 MG;  Start 7/20/19 at 21:00





Assessment/Plan


Hospital Course (Demo Recall)


1.  End-stage renal disease.  Will dialyze on schedule today as patient has 


hyperkalemia.  


- likely underlying diabetic nephrosclerosis.  Will monitor.


- assess daily for hd needs.  


- all meds dosed ok.


2.  Chest pain.  Workup is underway.  Claims has had numerous cardiac workups 


but none recently.


3.  History of lupus with unclear diagnostic criteria on no medicines.


4.  Hypertension.  Continue regular medications.


5.  Anemia of chronic kidney disease.  At this time, patient has pancytopenia.  


The patient is on mircerra as an outpatient.  The patient had a bad REACTION TO 


ERYTHROPOIETIN, so will not order any here.


6.  Leukopenia.  Consider workup, may be related to the autoimmune disease.











RAJIV AHMADI MD          Jul 21, 2019 08:13

## 2019-10-30 ENCOUNTER — HOSPITAL ENCOUNTER (EMERGENCY)
Dept: HOSPITAL 10 - E/R | Age: 64
Discharge: HOME | End: 2019-10-30
Payer: MEDICARE

## 2019-10-30 VITALS
HEIGHT: 62 IN | WEIGHT: 106.04 LBS | WEIGHT: 106.04 LBS | BODY MASS INDEX: 19.51 KG/M2 | HEIGHT: 62 IN | BODY MASS INDEX: 19.51 KG/M2

## 2019-10-30 VITALS — HEART RATE: 68 BPM | RESPIRATION RATE: 20 BRPM | SYSTOLIC BLOOD PRESSURE: 124 MMHG | DIASTOLIC BLOOD PRESSURE: 57 MMHG

## 2019-10-30 DIAGNOSIS — D61.818: ICD-10-CM

## 2019-10-30 DIAGNOSIS — I11.0: ICD-10-CM

## 2019-10-30 DIAGNOSIS — Z87.891: ICD-10-CM

## 2019-10-30 DIAGNOSIS — Z79.82: ICD-10-CM

## 2019-10-30 DIAGNOSIS — E87.5: ICD-10-CM

## 2019-10-30 DIAGNOSIS — R19.5: Primary | ICD-10-CM

## 2019-10-30 DIAGNOSIS — I50.9: ICD-10-CM

## 2019-10-30 DIAGNOSIS — J40: ICD-10-CM

## 2019-10-30 PROCEDURE — 96375 TX/PRO/DX INJ NEW DRUG ADDON: CPT

## 2019-10-30 PROCEDURE — 94640 AIRWAY INHALATION TREATMENT: CPT

## 2019-10-30 PROCEDURE — 82962 GLUCOSE BLOOD TEST: CPT

## 2019-10-30 PROCEDURE — 86900 BLOOD TYPING SEROLOGIC ABO: CPT

## 2019-10-30 PROCEDURE — 96374 THER/PROPH/DIAG INJ IV PUSH: CPT

## 2019-10-30 PROCEDURE — 99284 EMERGENCY DEPT VISIT MOD MDM: CPT

## 2019-10-30 PROCEDURE — 86901 BLOOD TYPING SEROLOGIC RH(D): CPT

## 2019-10-30 PROCEDURE — 82270 OCCULT BLOOD FECES: CPT

## 2019-10-30 PROCEDURE — 85610 PROTHROMBIN TIME: CPT

## 2019-10-30 PROCEDURE — 94664 DEMO&/EVAL PT USE INHALER: CPT

## 2019-10-30 PROCEDURE — 80048 BASIC METABOLIC PNL TOTAL CA: CPT

## 2019-10-30 PROCEDURE — 86850 RBC ANTIBODY SCREEN: CPT

## 2019-10-30 PROCEDURE — 85025 COMPLETE CBC W/AUTO DIFF WBC: CPT

## 2019-10-30 PROCEDURE — 36415 COLL VENOUS BLD VENIPUNCTURE: CPT

## 2019-10-30 PROCEDURE — 85730 THROMBOPLASTIN TIME PARTIAL: CPT
